# Patient Record
Sex: MALE | Race: WHITE | NOT HISPANIC OR LATINO | URBAN - METROPOLITAN AREA
[De-identification: names, ages, dates, MRNs, and addresses within clinical notes are randomized per-mention and may not be internally consistent; named-entity substitution may affect disease eponyms.]

---

## 2021-03-27 ENCOUNTER — IMMUNIZATION (OUTPATIENT)
Dept: PRIMARY CARE CLINIC | Facility: CLINIC | Age: 41
End: 2021-03-27

## 2021-03-27 DIAGNOSIS — Z23 NEED FOR VACCINATION: Primary | ICD-10-CM

## 2021-03-27 PROCEDURE — 91301 PR SARS-COV-2 COVID-19 VACCINE, NO PRSV, 100MCG/0.5ML, IM: ICD-10-PCS | Mod: S$GLB,,, | Performed by: INTERNAL MEDICINE

## 2021-03-27 PROCEDURE — 0011A PR IMMUNIZ ADMIN, SARS-COV-2 COVID-19 VACC, 100MCG/0.5ML, 1ST DOSE: CPT | Mod: CV19,S$GLB,, | Performed by: INTERNAL MEDICINE

## 2021-03-27 PROCEDURE — 0011A PR IMMUNIZ ADMIN, SARS-COV-2 COVID-19 VACC, 100MCG/0.5ML, 1ST DOSE: ICD-10-PCS | Mod: CV19,S$GLB,, | Performed by: INTERNAL MEDICINE

## 2021-03-27 PROCEDURE — 91301 PR SARS-COV-2 COVID-19 VACCINE, NO PRSV, 100MCG/0.5ML, IM: CPT | Mod: S$GLB,,, | Performed by: INTERNAL MEDICINE

## 2021-03-27 RX ADMIN — Medication 0.5 ML: at 11:03

## 2023-01-10 ENCOUNTER — HOSPITAL ENCOUNTER (OUTPATIENT)
Dept: RADIOLOGY | Facility: HOSPITAL | Age: 43
Discharge: HOME | End: 2023-01-10
Attending: FAMILY MEDICINE
Payer: MEDICAID

## 2023-01-10 ENCOUNTER — OFFICE VISIT (OUTPATIENT)
Dept: FAMILY MEDICINE | Facility: CLINIC | Age: 43
End: 2023-01-10
Payer: MEDICAID

## 2023-01-10 VITALS
WEIGHT: 315 LBS | HEART RATE: 75 BPM | DIASTOLIC BLOOD PRESSURE: 94 MMHG | BODY MASS INDEX: 40.89 KG/M2 | TEMPERATURE: 97 F | SYSTOLIC BLOOD PRESSURE: 150 MMHG | OXYGEN SATURATION: 99 %

## 2023-01-10 DIAGNOSIS — F43.0 ACUTE STRESS REACTION: ICD-10-CM

## 2023-01-10 DIAGNOSIS — R07.89 OTHER CHEST PAIN: ICD-10-CM

## 2023-01-10 DIAGNOSIS — R06.09 DOE (DYSPNEA ON EXERTION): ICD-10-CM

## 2023-01-10 DIAGNOSIS — G47.30 SLEEP APNEA, UNSPECIFIED TYPE: ICD-10-CM

## 2023-01-10 DIAGNOSIS — R53.83 FATIGUE, UNSPECIFIED TYPE: ICD-10-CM

## 2023-01-10 DIAGNOSIS — I10 PRIMARY HYPERTENSION: Primary | ICD-10-CM

## 2023-01-10 PROCEDURE — 71046 X-RAY EXAM CHEST 2 VIEWS: CPT | Mod: 26,,, | Performed by: RADIOLOGY

## 2023-01-10 PROCEDURE — 3080F PR MOST RECENT DIASTOLIC BLOOD PRESSURE >= 90 MM HG: ICD-10-PCS | Mod: CPTII,,, | Performed by: FAMILY MEDICINE

## 2023-01-10 PROCEDURE — 1159F PR MEDICATION LIST DOCUMENTED IN MEDICAL RECORD: ICD-10-PCS | Mod: CPTII,,, | Performed by: FAMILY MEDICINE

## 2023-01-10 PROCEDURE — 1160F PR REVIEW ALL MEDS BY PRESCRIBER/CLIN PHARMACIST DOCUMENTED: ICD-10-PCS | Mod: CPTII,,, | Performed by: FAMILY MEDICINE

## 2023-01-10 PROCEDURE — 93010 ELECTROCARDIOGRAM REPORT: CPT | Mod: S$PBB,,, | Performed by: INTERNAL MEDICINE

## 2023-01-10 PROCEDURE — 3077F PR MOST RECENT SYSTOLIC BLOOD PRESSURE >= 140 MM HG: ICD-10-PCS | Mod: CPTII,,, | Performed by: FAMILY MEDICINE

## 2023-01-10 PROCEDURE — 3008F BODY MASS INDEX DOCD: CPT | Mod: CPTII,,, | Performed by: FAMILY MEDICINE

## 2023-01-10 PROCEDURE — 99215 OFFICE O/P EST HI 40 MIN: CPT | Mod: PBBFAC,25,PO | Performed by: FAMILY MEDICINE

## 2023-01-10 PROCEDURE — 4010F ACE/ARB THERAPY RXD/TAKEN: CPT | Mod: CPTII,,, | Performed by: FAMILY MEDICINE

## 2023-01-10 PROCEDURE — 99204 PR OFFICE/OUTPT VISIT, NEW, LEVL IV, 45-59 MIN: ICD-10-PCS | Mod: S$PBB,,, | Performed by: FAMILY MEDICINE

## 2023-01-10 PROCEDURE — 3077F SYST BP >= 140 MM HG: CPT | Mod: CPTII,,, | Performed by: FAMILY MEDICINE

## 2023-01-10 PROCEDURE — 71046 X-RAY EXAM CHEST 2 VIEWS: CPT | Mod: TC,FY,PO

## 2023-01-10 PROCEDURE — 3008F PR BODY MASS INDEX (BMI) DOCUMENTED: ICD-10-PCS | Mod: CPTII,,, | Performed by: FAMILY MEDICINE

## 2023-01-10 PROCEDURE — 1159F MED LIST DOCD IN RCRD: CPT | Mod: CPTII,,, | Performed by: FAMILY MEDICINE

## 2023-01-10 PROCEDURE — 71046 XR CHEST PA AND LATERAL: ICD-10-PCS | Mod: 26,,, | Performed by: RADIOLOGY

## 2023-01-10 PROCEDURE — 3080F DIAST BP >= 90 MM HG: CPT | Mod: CPTII,,, | Performed by: FAMILY MEDICINE

## 2023-01-10 PROCEDURE — 99204 OFFICE O/P NEW MOD 45 MIN: CPT | Mod: S$PBB,,, | Performed by: FAMILY MEDICINE

## 2023-01-10 PROCEDURE — 4010F PR ACE/ARB THEARPY RXD/TAKEN: ICD-10-PCS | Mod: CPTII,,, | Performed by: FAMILY MEDICINE

## 2023-01-10 PROCEDURE — 93005 ELECTROCARDIOGRAM TRACING: CPT | Mod: PBBFAC,PO | Performed by: INTERNAL MEDICINE

## 2023-01-10 PROCEDURE — 1160F RVW MEDS BY RX/DR IN RCRD: CPT | Mod: CPTII,,, | Performed by: FAMILY MEDICINE

## 2023-01-10 PROCEDURE — 93010 EKG 12-LEAD: ICD-10-PCS | Mod: S$PBB,,, | Performed by: INTERNAL MEDICINE

## 2023-01-10 PROCEDURE — 99999 PR PBB SHADOW E&M-EST. PATIENT-LVL V: CPT | Mod: PBBFAC,,, | Performed by: FAMILY MEDICINE

## 2023-01-10 PROCEDURE — 99999 PR PBB SHADOW E&M-EST. PATIENT-LVL V: ICD-10-PCS | Mod: PBBFAC,,, | Performed by: FAMILY MEDICINE

## 2023-01-10 RX ORDER — CITALOPRAM 20 MG/1
20 TABLET, FILM COATED ORAL DAILY
Qty: 30 TABLET | Refills: 11 | Status: SHIPPED | OUTPATIENT
Start: 2023-01-10 | End: 2024-01-18

## 2023-01-10 RX ORDER — IBUPROFEN 800 MG/1
800 TABLET ORAL EVERY 6 HOURS PRN
COMMUNITY
Start: 2022-10-02 | End: 2023-05-15

## 2023-01-10 RX ORDER — VALSARTAN AND HYDROCHLOROTHIAZIDE 160; 12.5 MG/1; MG/1
1 TABLET, FILM COATED ORAL DAILY
Qty: 90 TABLET | Refills: 3 | Status: SHIPPED | OUTPATIENT
Start: 2023-01-10 | End: 2023-03-09

## 2023-01-10 NOTE — PROGRESS NOTES
Chief Complaint:    Chief Complaint   Patient presents with    Hypertension       History of Present Illness:    Patient is here after long time,   He says he has been to the ER with extremely high blood pressure episode systolic up in the 220s, this was accompanied by chest pain.    Patient says over the last 6 months he has been short of breath with slight exertion and he gets chest pain.  He has been extremely tired fatigued no energy and can fall asleep easily.    Denies any orthopnea  He denies any weight gain   Occasional alcohol use and does weight but is giving that up also soon.    He was given lisinopril at home but he is not brought any home readings.  He has some tingling numbness of his fingers also.      He is under a lot of stress for about a month or 2 going through some legal battles          ROS:  Review of Systems   Constitutional:  Negative for appetite change, chills and fever.   HENT:  Negative for congestion, ear pain, postnasal drip, rhinorrhea, sinus pressure and sinus pain.    Eyes:  Negative for pain.   Respiratory:  Positive for shortness of breath. Negative for chest tightness.    Cardiovascular:  Positive for chest pain and palpitations.   Gastrointestinal:  Negative for abdominal pain, blood in stool, constipation, diarrhea and nausea.   Genitourinary:  Negative for difficulty urinating, dysuria, flank pain and hematuria.   Musculoskeletal:  Negative for arthralgias and myalgias.   Skin:  Negative for pallor and wound.   Neurological:  Negative for dizziness, tremors, speech difficulty, light-headedness and headaches.   Psychiatric/Behavioral:  Negative for behavioral problems, dysphoric mood and sleep disturbance. The patient is nervous/anxious.    All other systems reviewed and are negative.    History reviewed. No pertinent past medical history.    Social History:  Social History     Socioeconomic History    Marital status:    Tobacco Use    Smoking status: Light Smoker      Packs/day: 0.25     Years: 1.00     Pack years: 0.25     Types: Cigars, Cigarettes    Smokeless tobacco: Never    Tobacco comments:     cigars   Substance and Sexual Activity    Alcohol use: Yes    Drug use: No    Sexual activity: Yes     Partners: Female     Birth control/protection: None       Family History:   family history is not on file.    Health Maintenance   Topic Date Due    Hepatitis C Screening  Never done    Lipid Panel  Never done    TETANUS VACCINE  Never done       Physical Exam:    Vital Signs  Temp: 97.4 °F (36.3 °C)  Temp src: Tympanic  Pulse: 75  SpO2: 99 %  BP: (!) 150/94  BP Location: Right arm  Patient Position: Sitting  Pain Score: 0-No pain  Height and Weight  Weight: (!) 144.4 kg (318 lb 7.3 oz)]    Body mass index is 40.89 kg/m².    Physical Exam  Vitals and nursing note reviewed.   Constitutional:       Appearance: Normal appearance.   HENT:      Head: Normocephalic and atraumatic.      Right Ear: Tympanic membrane and ear canal normal.      Left Ear: Tympanic membrane and ear canal normal.   Eyes:      Extraocular Movements: Extraocular movements intact.      Pupils: Pupils are equal, round, and reactive to light.   Cardiovascular:      Rate and Rhythm: Normal rate and regular rhythm.      Pulses: Normal pulses.      Heart sounds: Normal heart sounds. No murmur heard.    No gallop.   Pulmonary:      Effort: Pulmonary effort is normal. No respiratory distress.      Breath sounds: Normal breath sounds. No wheezing, rhonchi or rales.   Abdominal:      General: There is no distension.      Palpations: Abdomen is soft.      Tenderness: There is no abdominal tenderness.   Musculoskeletal:         General: No swelling, deformity or signs of injury. Normal range of motion.      Cervical back: Normal range of motion.   Skin:     General: Skin is warm and dry.      Capillary Refill: Capillary refill takes less than 2 seconds.      Coloration: Skin is not jaundiced or pale.   Neurological:       General: No focal deficit present.      Mental Status: He is alert and oriented to person, place, and time.   Psychiatric:         Mood and Affect: Mood normal.         Behavior: Behavior normal.         Assessment:      ICD-10-CM ICD-9-CM   1. Primary hypertension  I10 401.9   2. Acute stress reaction  F43.0 308.9   3. BLAS (dyspnea on exertion)  R06.09 786.09   4. Other chest pain  R07.89 786.59   5. Sleep apnea, unspecified type  G47.30 780.57   6. Fatigue, unspecified type  R53.83 780.79         Plan:       He will need a full workup, differential include coronary ischemia/underlying heart failure.  EKG chest x-ray and labs as below   Patient's [place] score is abnormal he will need a sleep study to rule out sleep apnea   Underlying fatigue could be multifactorial possibly related to sleep apnea but other causes need to be ruled out  Uncontrolled blood pressure will need to be treated start on Diovan hydrochlorothiazide start monitoring blood pressure twice a day and bring the numbers back     Patient is scheduled for a trial in the next couple of weeks however his medical conditions warrant the full evaluation before he can be released for a few weeks to undergo a legal trial.    He will follow-up in 2 weeks        Orders Placed This Encounter   Procedures    X-Ray Chest PA And Lateral    CBC Auto Differential    Comprehensive Metabolic Panel    Hemoglobin A1C    Lipid Panel    TSH    Vitamin B12    Testosterone    B-TYPE NATRIURETIC PEPTIDE    Nuclear Stress - Cardiology Interpreted    EKG 12-lead    Home Sleep Study       Current Outpatient Medications   Medication Sig Dispense Refill    ibuprofen (ADVIL,MOTRIN) 800 MG tablet Take 800 mg by mouth every 6 (six) hours as needed.      citalopram (CELEXA) 20 MG tablet Take 1 tablet (20 mg total) by mouth once daily. 30 tablet 11    valsartan-hydrochlorothiazide (DIOVAN-HCT) 160-12.5 mg per tablet Take 1 tablet by mouth once daily. 90 tablet 3     No current  facility-administered medications for this visit.       There are no discontinued medications.    Follow up in about 2 weeks (around 1/24/2023).      Teri Rich MD      Scribe Attestation:   I, Ken Peter, am scribing for, and in the presence of, Dr.Arif Rich I performed the above scribed service and the documentation accurately describes the services I performed. I attest to the accuracy of the note.    I, Dr. Teri Rich, reviewed documentation as scribed above. I performed the services described in this documentation.  I agree that the record reflects my personal performance and is accurate and complete. Teri Rich MD.  01/06/2023

## 2023-01-11 ENCOUNTER — LAB VISIT (OUTPATIENT)
Dept: LAB | Facility: HOSPITAL | Age: 43
End: 2023-01-11
Attending: FAMILY MEDICINE
Payer: MEDICAID

## 2023-01-11 ENCOUNTER — TELEPHONE (OUTPATIENT)
Dept: CARDIOLOGY | Facility: HOSPITAL | Age: 43
End: 2023-01-11
Payer: MEDICAID

## 2023-01-11 ENCOUNTER — TELEPHONE (OUTPATIENT)
Dept: SLEEP MEDICINE | Facility: CLINIC | Age: 43
End: 2023-01-11
Payer: MEDICAID

## 2023-01-11 DIAGNOSIS — R07.89 OTHER CHEST PAIN: ICD-10-CM

## 2023-01-11 DIAGNOSIS — R53.83 FATIGUE, UNSPECIFIED TYPE: ICD-10-CM

## 2023-01-11 DIAGNOSIS — I10 PRIMARY HYPERTENSION: ICD-10-CM

## 2023-01-11 LAB
ALBUMIN SERPL BCP-MCNC: 4.5 G/DL (ref 3.5–5.2)
ALP SERPL-CCNC: 75 U/L (ref 55–135)
ALT SERPL W/O P-5'-P-CCNC: 27 U/L (ref 10–44)
ANION GAP SERPL CALC-SCNC: 15 MMOL/L (ref 8–16)
AST SERPL-CCNC: 25 U/L (ref 10–40)
BASOPHILS # BLD AUTO: 0.07 K/UL (ref 0–0.2)
BASOPHILS NFR BLD: 0.6 % (ref 0–1.9)
BILIRUB SERPL-MCNC: 1 MG/DL (ref 0.1–1)
BNP SERPL-MCNC: <10 PG/ML (ref 0–99)
BUN SERPL-MCNC: 12 MG/DL (ref 6–20)
CALCIUM SERPL-MCNC: 10.3 MG/DL (ref 8.7–10.5)
CHLORIDE SERPL-SCNC: 103 MMOL/L (ref 95–110)
CHOLEST SERPL-MCNC: 206 MG/DL (ref 120–199)
CHOLEST/HDLC SERPL: 7.1 {RATIO} (ref 2–5)
CO2 SERPL-SCNC: 19 MMOL/L (ref 23–29)
CREAT SERPL-MCNC: 0.8 MG/DL (ref 0.5–1.4)
DIFFERENTIAL METHOD: ABNORMAL
EOSINOPHIL # BLD AUTO: 0.3 K/UL (ref 0–0.5)
EOSINOPHIL NFR BLD: 2.4 % (ref 0–8)
ERYTHROCYTE [DISTWIDTH] IN BLOOD BY AUTOMATED COUNT: 12.6 % (ref 11.5–14.5)
EST. GFR  (NO RACE VARIABLE): >60 ML/MIN/1.73 M^2
ESTIMATED AVG GLUCOSE: 88 MG/DL (ref 68–131)
GLUCOSE SERPL-MCNC: 82 MG/DL (ref 70–110)
HBA1C MFR BLD: 4.7 % (ref 4–5.6)
HCT VFR BLD AUTO: 50.5 % (ref 40–54)
HDLC SERPL-MCNC: 29 MG/DL (ref 40–75)
HDLC SERPL: 14.1 % (ref 20–50)
HGB BLD-MCNC: 17 G/DL (ref 14–18)
IMM GRANULOCYTES # BLD AUTO: 0.07 K/UL (ref 0–0.04)
IMM GRANULOCYTES NFR BLD AUTO: 0.6 % (ref 0–0.5)
LDLC SERPL CALC-MCNC: 146.4 MG/DL (ref 63–159)
LYMPHOCYTES # BLD AUTO: 2.4 K/UL (ref 1–4.8)
LYMPHOCYTES NFR BLD: 22 % (ref 18–48)
MCH RBC QN AUTO: 31.7 PG (ref 27–31)
MCHC RBC AUTO-ENTMCNC: 33.7 G/DL (ref 32–36)
MCV RBC AUTO: 94 FL (ref 82–98)
MONOCYTES # BLD AUTO: 0.7 K/UL (ref 0.3–1)
MONOCYTES NFR BLD: 6.3 % (ref 4–15)
NEUTROPHILS # BLD AUTO: 7.3 K/UL (ref 1.8–7.7)
NEUTROPHILS NFR BLD: 68.1 % (ref 38–73)
NONHDLC SERPL-MCNC: 177 MG/DL
NRBC BLD-RTO: 0 /100 WBC
PLATELET # BLD AUTO: 183 K/UL (ref 150–450)
PMV BLD AUTO: 12.1 FL (ref 9.2–12.9)
POTASSIUM SERPL-SCNC: 4.3 MMOL/L (ref 3.5–5.1)
PROT SERPL-MCNC: 7.7 G/DL (ref 6–8.4)
RBC # BLD AUTO: 5.36 M/UL (ref 4.6–6.2)
SODIUM SERPL-SCNC: 137 MMOL/L (ref 136–145)
TESTOST SERPL-MCNC: 336 NG/DL (ref 304–1227)
TRIGL SERPL-MCNC: 153 MG/DL (ref 30–150)
TSH SERPL DL<=0.005 MIU/L-ACNC: 1.18 UIU/ML (ref 0.4–4)
VIT B12 SERPL-MCNC: 1160 PG/ML (ref 210–950)
WBC # BLD AUTO: 10.78 K/UL (ref 3.9–12.7)

## 2023-01-11 PROCEDURE — 80061 LIPID PANEL: CPT | Performed by: FAMILY MEDICINE

## 2023-01-11 PROCEDURE — 82607 VITAMIN B-12: CPT | Performed by: FAMILY MEDICINE

## 2023-01-11 PROCEDURE — 83880 ASSAY OF NATRIURETIC PEPTIDE: CPT | Performed by: FAMILY MEDICINE

## 2023-01-11 PROCEDURE — 84443 ASSAY THYROID STIM HORMONE: CPT | Performed by: FAMILY MEDICINE

## 2023-01-11 PROCEDURE — 36415 COLL VENOUS BLD VENIPUNCTURE: CPT | Mod: PO | Performed by: FAMILY MEDICINE

## 2023-01-11 PROCEDURE — 80053 COMPREHEN METABOLIC PANEL: CPT | Performed by: FAMILY MEDICINE

## 2023-01-11 PROCEDURE — 83036 HEMOGLOBIN GLYCOSYLATED A1C: CPT | Performed by: FAMILY MEDICINE

## 2023-01-11 PROCEDURE — 84403 ASSAY OF TOTAL TESTOSTERONE: CPT | Performed by: FAMILY MEDICINE

## 2023-01-11 PROCEDURE — 85025 COMPLETE CBC W/AUTO DIFF WBC: CPT | Performed by: FAMILY MEDICINE

## 2023-01-12 ENCOUNTER — PATIENT MESSAGE (OUTPATIENT)
Dept: FAMILY MEDICINE | Facility: CLINIC | Age: 43
End: 2023-01-12
Payer: MEDICAID

## 2023-01-24 ENCOUNTER — HOSPITAL ENCOUNTER (OUTPATIENT)
Dept: CARDIOLOGY | Facility: HOSPITAL | Age: 43
Discharge: HOME | End: 2023-01-24
Attending: FAMILY MEDICINE
Payer: MEDICAID

## 2023-01-24 ENCOUNTER — HOSPITAL ENCOUNTER (OUTPATIENT)
Dept: RADIOLOGY | Facility: HOSPITAL | Age: 43
Discharge: HOME | End: 2023-01-24
Attending: FAMILY MEDICINE
Payer: MEDICAID

## 2023-01-24 DIAGNOSIS — R07.89 OTHER CHEST PAIN: ICD-10-CM

## 2023-01-24 PROCEDURE — 93016 NUCLEAR STRESS - CARDIOLOGY INTERPRETED (CUPID ONLY): ICD-10-PCS | Mod: ,,, | Performed by: INTERNAL MEDICINE

## 2023-01-24 PROCEDURE — 93018 NUCLEAR STRESS - CARDIOLOGY INTERPRETED (CUPID ONLY): ICD-10-PCS | Mod: ,,, | Performed by: INTERNAL MEDICINE

## 2023-01-24 PROCEDURE — A9502 TC99M TETROFOSMIN: HCPCS

## 2023-01-24 PROCEDURE — 78452 HT MUSCLE IMAGE SPECT MULT: CPT

## 2023-01-24 PROCEDURE — 93017 CV STRESS TEST TRACING ONLY: CPT

## 2023-01-24 PROCEDURE — 93018 CV STRESS TEST I&R ONLY: CPT | Mod: ,,, | Performed by: INTERNAL MEDICINE

## 2023-01-24 PROCEDURE — 78452 HT MUSCLE IMAGE SPECT MULT: CPT | Mod: 26,,, | Performed by: INTERNAL MEDICINE

## 2023-01-24 PROCEDURE — 78452 NUCLEAR STRESS - CARDIOLOGY INTERPRETED (CUPID ONLY): ICD-10-PCS | Mod: 26,,, | Performed by: INTERNAL MEDICINE

## 2023-01-24 PROCEDURE — 63600175 PHARM REV CODE 636 W HCPCS: Performed by: FAMILY MEDICINE

## 2023-01-24 PROCEDURE — 93016 CV STRESS TEST SUPVJ ONLY: CPT | Mod: ,,, | Performed by: INTERNAL MEDICINE

## 2023-01-24 RX ORDER — REGADENOSON 0.08 MG/ML
0.4 INJECTION, SOLUTION INTRAVENOUS ONCE
Status: COMPLETED | OUTPATIENT
Start: 2023-01-24 | End: 2023-01-24

## 2023-01-24 RX ADMIN — REGADENOSON 0.4 MG: 0.08 INJECTION, SOLUTION INTRAVENOUS at 12:01

## 2023-01-25 ENCOUNTER — TELEPHONE (OUTPATIENT)
Dept: INTERNAL MEDICINE | Facility: CLINIC | Age: 43
End: 2023-01-25
Payer: MEDICAID

## 2023-01-25 LAB
CV STRESS BASE HR: 82 BPM
DIASTOLIC BLOOD PRESSURE: 86 MMHG
NUC REST EJECTION FRACTION: 68
NUC STRESS EJECTION FRACTION: 68 %
OHS CV CPX 85 PERCENT MAX PREDICTED HEART RATE MALE: 153
OHS CV CPX MAX PREDICTED HEART RATE: 180
OHS CV CPX PATIENT IS FEMALE: 0
OHS CV CPX PATIENT IS MALE: 1
OHS CV CPX PEAK DIASTOLIC BLOOD PRESSURE: 71 MMHG
OHS CV CPX PEAK HEAR RATE: 115 BPM
OHS CV CPX PEAK RATE PRESSURE PRODUCT: NORMAL
OHS CV CPX PEAK SYSTOLIC BLOOD PRESSURE: 136 MMHG
OHS CV CPX PERCENT MAX PREDICTED HEART RATE ACHIEVED: 64
OHS CV CPX RATE PRESSURE PRODUCT PRESENTING: NORMAL
STRESS ECHO POST EXERCISE DUR SEC: 57 SECONDS
SYSTOLIC BLOOD PRESSURE: 124 MMHG

## 2023-01-25 NOTE — TELEPHONE ENCOUNTER
Spoke with pt verbalized understanding that stress test  results were normal upon review by Dr. Rich

## 2023-01-25 NOTE — TELEPHONE ENCOUNTER
----- Message from Teri Rich MD sent at 1/25/2023  1:33 PM CST -----  Stress test is essentially normal.

## 2023-02-06 ENCOUNTER — HOSPITAL ENCOUNTER (OUTPATIENT)
Dept: SLEEP MEDICINE | Facility: HOSPITAL | Age: 43
Discharge: HOME | End: 2023-02-06
Attending: FAMILY MEDICINE
Payer: MEDICAID

## 2023-02-06 DIAGNOSIS — G47.30 SLEEP APNEA, UNSPECIFIED TYPE: ICD-10-CM

## 2023-02-06 DIAGNOSIS — G47.33 OSA (OBSTRUCTIVE SLEEP APNEA): Primary | ICD-10-CM

## 2023-02-06 PROCEDURE — 95800 SLP STDY UNATTENDED: CPT | Mod: 26,,, | Performed by: INTERNAL MEDICINE

## 2023-02-06 PROCEDURE — 95800 PR SLEEP STUDY, UNATTENDED, RECORD HEART RATE/O2 SAT/RESP ANAL/SLEEP TIME: ICD-10-PCS | Mod: 26,,, | Performed by: INTERNAL MEDICINE

## 2023-02-06 NOTE — Clinical Note
PHYSICIAN INTERPRETATION AND COMMENTS: Findings are consistent with moderate, positional obstructive sleep apnea(ENRIQUE). Therapy with CPAP indicated. CLINICAL HISTORY: 42 year old male presented with: 17 inch neck, BMI of 38.5, an [place] sleepiness score of 14, history of hypertension and symptoms of nocturnal snoring, waking up choking and witnessed apneas. Based on the clinical history, the patient has a high pre-test probability of having Severe ENRIQUE.

## 2023-02-06 NOTE — PROCEDURES
PHYSICIAN INTERPRETATION AND COMMENTS: Findings are consistent with moderate, positional obstructive sleep  apnea(ENRIQUE). Therapy with CPAP indicated.  CLINICAL HISTORY: 42 year old male presented with: 17 inch neck, BMI of 38.5, an [place] sleepiness score of 14, history of  hypertension and symptoms of nocturnal snoring, waking up choking and witnessed apneas. Based on the clinical history,  the patient has a high pre-test probability of having Severe ENRIQUE.  SLEEP STUDY FINDINGS: Patient underwent a 1 night Home Sleep Test and by behavioral criteria, slept for approximately  6.49 hours, with a sleep latency of 12 minutes and a sleep efficiency of 96%. Mild sleep disordered breathing (AHI=8) is  noted based on a 4% hypopnea desaturation criteria, predominantly in the supine position (13 events/hour). The patient  slept supine 55.8% of the night based on valid recording time of 6.54 hours and is 6.5 times as likely to have  apneas/hypopneas when supine. When considering more subtle measures of sleep disordered breathing, the overall  respiratory disturbance index is moderate(RDI=21) based on a 1% hypopnea desaturation criteria with confirmation by  surrogate arousal indicators. The apneas/hypopneas are accompanied by minimal oxygen desaturation (percent time  below 90% SpO2: 0%, Min SpO2: 85.6%). The average desaturation across all sleep disordered breathing events is 2.5%.  Snoring occurs for 0.7% (30 dB) of the study. The mean pulse rate is 61.6 BPM, with very frequent pulse rate variability (62  events with >= 6 BPM increase/decrease per hour).  TREATMENT CONSIDERATIONS: Consider nasal continuous positive airway pressure (CPAP/AutoPAP) as the initial  treatment choice based on the RDI severity, daytime somnolence and co-morbidities. A mandibular advancement splint  (MAS) or referral to an ENT surgeon for modification to the airway should be considered to reduce daytime somnolence  and the potential contribution of  "ENRIQUE on existing diseases if the patient prefers an alternative therapy or the CPAP trial is  unsuccessful. Based on the ENRIQUE Supine data in the study, a Mandibular Advancement Splint (MAS) will likely provide  treatment benefit independent of ENRIQUE severity. The patient should avoid sleeping supine given non-supine AHI is in the  normal range.  DISEASE MANAGEMENT CONSIDERATIONS: None.      Dear Teri Rich MD  [id number] [address] [id number]/Teri Rich MD         The sleep study that you ordered is complete.  You have ordered sleep LAB services to perform the sleep study for Kendrick Moreno .      Please find Sleep Study result in  the "Media tab" of Chart Review menu.        You can look  for the report in the  Media by the document type "Sleep Study Documents". Alphabetizing  "Document type" column helps to find the SLEEP STUDY report  Faster.       As the ordering provider, you are responsible for reviewing the results and implementing a treatment plan with your patient.    If you need a Sleep Medicine provider to explain the sleep study findings and arrange treatment for the patient, please refer patient for consultation to our Sleep Clinic via [facility] with Ambulatory Consult Sleep.     To do that please place an order for an  "Ambulatory Consult Sleep" -  order , it will go to our clinic work queue for our staff  to contact the patient for an appointment.      For any questions, please contact our sleep lab  staff at [phone] to talk to clinical staff          Savage Crowe MD   "

## 2023-02-07 ENCOUNTER — TELEPHONE (OUTPATIENT)
Dept: INTERNAL MEDICINE | Facility: CLINIC | Age: 43
End: 2023-02-07
Payer: MEDICAID

## 2023-02-07 NOTE — TELEPHONE ENCOUNTER
----- Message from Teri Rich MD sent at 2/7/2023  1:14 PM CST -----  Patient has moderate sleep apnea which is positional, recommend CPAP.    Also recommend using a mandibular advancement device this can be bought without a prescription    Please refer to pulmonology

## 2023-02-07 NOTE — TELEPHONE ENCOUNTER
Spoke with pt verbalized understanding of sleep study results and recommendation ( CPAP ) of Dr. Rich upon review

## 2023-02-09 ENCOUNTER — PATIENT MESSAGE (OUTPATIENT)
Dept: FAMILY MEDICINE | Facility: CLINIC | Age: 43
End: 2023-02-09
Payer: MEDICAID

## 2023-02-09 ENCOUNTER — TELEPHONE (OUTPATIENT)
Dept: FAMILY MEDICINE | Facility: CLINIC | Age: 43
End: 2023-02-09
Payer: MEDICAID

## 2023-02-09 DIAGNOSIS — G47.30 SLEEP APNEA, UNSPECIFIED TYPE: ICD-10-CM

## 2023-02-09 DIAGNOSIS — R06.09 DOE (DYSPNEA ON EXERTION): Primary | ICD-10-CM

## 2023-02-23 ENCOUNTER — OFFICE VISIT (OUTPATIENT)
Dept: PULMONOLOGY | Facility: CLINIC | Age: 43
End: 2023-02-23
Payer: MEDICAID

## 2023-02-23 VITALS
RESPIRATION RATE: 19 BRPM | DIASTOLIC BLOOD PRESSURE: 84 MMHG | HEIGHT: 74 IN | WEIGHT: 315 LBS | SYSTOLIC BLOOD PRESSURE: 120 MMHG | OXYGEN SATURATION: 98 % | HEART RATE: 76 BPM | BODY MASS INDEX: 40.43 KG/M2

## 2023-02-23 DIAGNOSIS — I10 ESSENTIAL HYPERTENSION: ICD-10-CM

## 2023-02-23 DIAGNOSIS — G47.33 OBSTRUCTIVE SLEEP APNEA: Primary | ICD-10-CM

## 2023-02-23 DIAGNOSIS — E66.01 CLASS 3 SEVERE OBESITY WITH SERIOUS COMORBIDITY AND BODY MASS INDEX (BMI) OF 40.0 TO 44.9 IN ADULT, UNSPECIFIED OBESITY TYPE: ICD-10-CM

## 2023-02-23 PROBLEM — E66.813 CLASS 3 SEVERE OBESITY WITH SERIOUS COMORBIDITY AND BODY MASS INDEX (BMI) OF 40.0 TO 44.9 IN ADULT: Status: ACTIVE | Noted: 2023-02-23

## 2023-02-23 PROCEDURE — 4010F PR ACE/ARB THEARPY RXD/TAKEN: ICD-10-PCS | Mod: CPTII,,, | Performed by: NURSE PRACTITIONER

## 2023-02-23 PROCEDURE — 3008F PR BODY MASS INDEX (BMI) DOCUMENTED: ICD-10-PCS | Mod: CPTII,,, | Performed by: NURSE PRACTITIONER

## 2023-02-23 PROCEDURE — 99999 PR PBB SHADOW E&M-EST. PATIENT-LVL IV: CPT | Mod: PBBFAC,,, | Performed by: NURSE PRACTITIONER

## 2023-02-23 PROCEDURE — 3044F HG A1C LEVEL LT 7.0%: CPT | Mod: CPTII,,, | Performed by: NURSE PRACTITIONER

## 2023-02-23 PROCEDURE — 3008F BODY MASS INDEX DOCD: CPT | Mod: CPTII,,, | Performed by: NURSE PRACTITIONER

## 2023-02-23 PROCEDURE — 3074F SYST BP LT 130 MM HG: CPT | Mod: CPTII,,, | Performed by: NURSE PRACTITIONER

## 2023-02-23 PROCEDURE — 4010F ACE/ARB THERAPY RXD/TAKEN: CPT | Mod: CPTII,,, | Performed by: NURSE PRACTITIONER

## 2023-02-23 PROCEDURE — 1159F PR MEDICATION LIST DOCUMENTED IN MEDICAL RECORD: ICD-10-PCS | Mod: CPTII,,, | Performed by: NURSE PRACTITIONER

## 2023-02-23 PROCEDURE — 3044F PR MOST RECENT HEMOGLOBIN A1C LEVEL <7.0%: ICD-10-PCS | Mod: CPTII,,, | Performed by: NURSE PRACTITIONER

## 2023-02-23 PROCEDURE — 1159F MED LIST DOCD IN RCRD: CPT | Mod: CPTII,,, | Performed by: NURSE PRACTITIONER

## 2023-02-23 PROCEDURE — 99999 PR PBB SHADOW E&M-EST. PATIENT-LVL IV: ICD-10-PCS | Mod: PBBFAC,,, | Performed by: NURSE PRACTITIONER

## 2023-02-23 PROCEDURE — 99203 OFFICE O/P NEW LOW 30 MIN: CPT | Mod: S$PBB,,, | Performed by: NURSE PRACTITIONER

## 2023-02-23 PROCEDURE — 3079F PR MOST RECENT DIASTOLIC BLOOD PRESSURE 80-89 MM HG: ICD-10-PCS | Mod: CPTII,,, | Performed by: NURSE PRACTITIONER

## 2023-02-23 PROCEDURE — 1160F RVW MEDS BY RX/DR IN RCRD: CPT | Mod: CPTII,,, | Performed by: NURSE PRACTITIONER

## 2023-02-23 PROCEDURE — 3074F PR MOST RECENT SYSTOLIC BLOOD PRESSURE < 130 MM HG: ICD-10-PCS | Mod: CPTII,,, | Performed by: NURSE PRACTITIONER

## 2023-02-23 PROCEDURE — 99203 PR OFFICE/OUTPT VISIT, NEW, LEVL III, 30-44 MIN: ICD-10-PCS | Mod: S$PBB,,, | Performed by: NURSE PRACTITIONER

## 2023-02-23 PROCEDURE — 1160F PR REVIEW ALL MEDS BY PRESCRIBER/CLIN PHARMACIST DOCUMENTED: ICD-10-PCS | Mod: CPTII,,, | Performed by: NURSE PRACTITIONER

## 2023-02-23 PROCEDURE — 99214 OFFICE O/P EST MOD 30 MIN: CPT | Mod: PBBFAC | Performed by: NURSE PRACTITIONER

## 2023-02-23 PROCEDURE — 3079F DIAST BP 80-89 MM HG: CPT | Mod: CPTII,,, | Performed by: NURSE PRACTITIONER

## 2023-02-23 RX ORDER — LISINOPRIL 10 MG/1
10 TABLET ORAL
COMMUNITY
Start: 2023-01-06 | End: 2023-02-23

## 2023-02-23 RX ORDER — TRIAZOLAM 0.25 MG/1
0.25 TABLET ORAL
COMMUNITY
Start: 2022-09-09 | End: 2023-05-15

## 2023-02-23 RX ORDER — LISINOPRIL 10 MG/1
TABLET ORAL
COMMUNITY
Start: 2023-01-06 | End: 2023-02-23

## 2023-02-23 NOTE — ASSESSMENT & PLAN NOTE
02/02/2023 Home Sleep Study mild to moderate obstructive sleep apnea AHI 8.0. RDI 21.  02/23/2023 order Auto CPAP 5-20 cm   Nasal mask   HME: [facility]  Follow up 31-90 days from obtaining PAP therapy for IDL.

## 2023-02-23 NOTE — ASSESSMENT & PLAN NOTE
Encouraged calorie reduction and 30 minutes of exercise daily. Discussed impact of obesity on general health.  Wt Readings from Last 9 Encounters:   02/23/23 (!) 143.3 kg (315 lb 14.7 oz)   01/10/23 (!) 144.4 kg (318 lb 7.3 oz)   11/16/16 (!) 140.2 kg (309 lb)   11/09/16 133.3 kg (293 lb 14 oz)   12/18/15 131.9 kg (290 lb 12.6 oz)   12/15/15 132.3 kg (291 lb 10.7 oz)   12/29/14 136.1 kg (300 lb)   12/17/14 136.1 kg (300 lb)   11/10/14 (!) 136.5 kg (301 lb)    Body mass index is 40.56 kg/m².

## 2023-02-23 NOTE — PROGRESS NOTES
Subjective:      Patient ID: Kendrick Moreno is a 42 y.o. male.    Patient Active Problem List   Diagnosis    Obstructive sleep apnea    Class 3 severe obesity with serious comorbidity and body mass index (BMI) of 40.0 to 44.9 in adult    Essential hypertension       he has been referred by Reagan Rich MD for evaluation and management for   Chief Complaint   Patient presents with    Sleep Apnea     Review sleep study       Chief Complaint: Sleep Apnea (Review sleep study)      HPI:  He presents for obstructive sleep apnea with review Home Sleep Study.    02/02/2023 Home Sleep Study mild to moderate obstructive sleep apnea AHI 8.0. RDI 21.    Patient has observed snoring, periods of not breathing witnessed by wife, feels sleepy during the day, take naps during the day.  Patient reports non restful sleep.  He reports morning headache.   He reports day time napping; duration 20 Minutes to 2 hours  He denies recent weight gain.  Cardiovascular risk factors: hypertension and obesity  Bed time is 1100 - 1200  Wake time is 0700  Sleep onset is within  15 - 30 Minutes.  Sleep maintenance difficulties related to non-restful sleep  Wake after sleep onset occurs one time a night.  Nocturia occurs none   Sleep aids :  NO  Dry mouth :  NO  Sleep walking:  NO  Sleep talking :  NO  Sleep eating: NO  Vivid Dreams :  NO  Cataplexy :  NO    [place] Sleepiness Scale   EPWORTH SLEEPINESS SCALE 2/22/2023 1/10/2023   Sitting and reading 3 2   Watching TV 3 3   Sitting, inactive in a public place (e.g. a theatre or a meeting) 2 1   As a passenger in a car for an hour without a break 3 2   Lying down to rest in the afternoon when circumstances permit 2 3   Sitting and talking to someone 1 1   Sitting quietly after a lunch without alcohol 2 2   In a car, while stopped for a few minutes in traffic 1 1   Total score 17 15       Neck circumference is 46 cm. (18 inches).  Mallampati score 4    STOP - BANG Questionnaire:   1. Snoring : Do  you snore loudly ?     YES  2. Tired : Do you often feel tired, fatigued, or sleepy during daytime?   YES  3. Observed: Has anyone observed you stop breathing during your sleep?     YES, wife  4. Blood pressure : Do you have or are you being treated for high blood pressure?    YES  5. BMI :BMI more than 35 kg/m2?    YES  6. Age : Age over 50 yr old?   NO  7. Neck circumference: Neck circumference greater than 40 cm?    YES  8. Gender: Gender male?    YES    SCORE: 7    High risk of ENRIQUE: Yes 5 - 8  Intermediate risk of ENRIQUE: Yes 3 - 4  Low risk of ENRIQUE: Yes 0 - 2    Previous Report Reviewed: lab reports and office notes     Past Medical History: The following portions of the patient's history were reviewed and updated as appropriate:   He  has no past surgical history on file.  His family history is not on file.  He  reports that he has been smoking cigars. He has a 0.25 pack-year smoking history. He has never used smokeless tobacco. He reports current alcohol use. He reports that he does not use drugs.  He has a current medication list which includes the following prescription(s): citalopram, valsartan-hydrochlorothiazide, ibuprofen, lisinopril, lisinopril, and triazolam.  He is allergic to keflex [cephalexin]..    Review of Systems   Constitutional:  Negative for fever, chills, weight loss, weight gain, activity change, appetite change, fatigue and night sweats.   HENT:  Negative for postnasal drip, rhinorrhea, sinus pressure, voice change and congestion.    Eyes:  Negative for redness and itching.   Respiratory:  Negative for snoring, cough, sputum production, chest tightness, shortness of breath, wheezing, orthopnea, asthma nighttime symptoms, dyspnea on extertion, use of rescue inhaler and somnolence.    Cardiovascular: Negative.  Negative for chest pain, palpitations and leg swelling.   Genitourinary:  Negative for difficulty urinating and hematuria.   Endocrine:  Negative for cold intolerance and heat  "intolerance.    Musculoskeletal:  Negative for arthralgias, gait problem, joint swelling and myalgias.   Skin: Negative.    Gastrointestinal:  Negative for nausea, vomiting, abdominal pain and acid reflux.   Neurological:  Negative for dizziness, weakness, light-headedness and headaches.   Hematological:  Negative for adenopathy. No excessive bruising.   All other systems reviewed and are negative.   Objective:   /84   Pulse 76   Resp 19   Ht 6' 2" (1.88 m)   Wt (!) 143.3 kg (315 lb 14.7 oz)   SpO2 98%   BMI 40.56 kg/m²   Physical Exam  Vitals and nursing note reviewed.   Constitutional:       General: He is not in acute distress.     Appearance: Normal appearance. He is well-developed. He is not ill-appearing or toxic-appearing.   HENT:      Head: Normocephalic and atraumatic.      Right Ear: External ear normal.      Left Ear: External ear normal.      Nose: Nose normal.      Mouth/Throat:      Mouth: Mucous membranes are moist.      Pharynx: Oropharynx is clear. No oropharyngeal exudate.   Eyes:      Conjunctiva/sclera: Conjunctivae normal.   Cardiovascular:      Rate and Rhythm: Normal rate and regular rhythm.      Heart sounds: Normal heart sounds.   Pulmonary:      Effort: Pulmonary effort is normal.      Breath sounds: Normal breath sounds.   Abdominal:      Palpations: Abdomen is soft.   Musculoskeletal:      Cervical back: Normal range of motion and neck supple.   Skin:     General: Skin is warm and dry.   Neurological:      Mental Status: He is alert and oriented to person, place, and time.   Psychiatric:         Behavior: Behavior normal. Behavior is cooperative.         Thought Content: Thought content normal.         Judgment: Judgment normal.       Personal Diagnostic Review  Review of labs, xray's, cardiology reports.     02/02/2023 Home Sleep Study mild to moderate obstructive sleep apnea AHI 8.0. RDI 21  Assessment:     1. Obstructive sleep apnea    2. Class 3 severe obesity with serious " comorbidity and body mass index (BMI) of 40.0 to 44.9 in adult, unspecified obesity type    3. Essential hypertension      Orders Placed This Encounter   Procedures    CPAP FOR HOME USE     02/02/2023 Home Sleep Study mild to moderate obstructive sleep apnea AHI 8.0. RDI 21     Order Specific Question:   Length of need (1-99 months):     Answer:   99     Order Specific Question:   Type ():     Answer:   Auto CPAP     Order Specific Question:   Auto CPAP pressure setting range (cmH20):     Answer:   5-20     Order Specific Question:   Fulfillment Priority:     Answer:   Level 4:  all others     Order Specific Question:   Humidification ():     Answer:   Heated     Order Specific Question:   Choose ONE mask type and its corresponding cushions and/or pillows:     Answer:    Nasal Mask, 1 per 90 days:  Nasal Cushions, (6 per 90 days):  Nasal Pillows, (6 per 90 days)     Comments:   or mask of choice     Order Specific Question:   Choose EITHER Heated or Non-Heated Tubjing     Answer:    Non-Heated Tubing, 1 per 90 days     Order Specific Question:   Number of Days Needed:     Answer:   99     Order Specific Question:   All other supplies as needed as listed below:     Answer:    Headgear, 1 per 180 days     Order Specific Question:   All other supplies as needed as listed below:     Answer:    Chin Strap, 1 per 180 days     Order Specific Question:   All other supplies as needed as listed below:     Answer:    Disposable Filter, 6 per 90 days     Order Specific Question:   All other supplies as needed as listed below:     Answer:    Non-Disposable Filter, 1 per 180 days     Order Specific Question:   All other supplies as needed as listed below:     Answer:    Humidifier Chamber, 1 per 180 days     Plan:   Discussed diagnosis, its evaluation, treatment and usual course. All questions answered.  Problem List Items Addressed This Visit       Obstructive sleep apnea -  Primary     02/02/2023 Home Sleep Study mild to moderate obstructive sleep apnea AHI 8.0. RDI 21.  02/23/2023 order Auto CPAP 5-20 cm   Nasal mask   HME: [facility]  Follow up 31-90 days from obtaining PAP therapy for IDL.              Relevant Orders    CPAP FOR HOME USE    Essential hypertension     Stable and controlled. Continue current treatment plan as previously prescribed with your PCP.              Class 3 severe obesity with serious comorbidity and body mass index (BMI) of 40.0 to 44.9 in adult     Encouraged calorie reduction and 30 minutes of exercise daily. Discussed impact of obesity on general health.  Wt Readings from Last 9 Encounters:   02/23/23 (!) 143.3 kg (315 lb 14.7 oz)   01/10/23 (!) 144.4 kg (318 lb 7.3 oz)   11/16/16 (!) 140.2 kg (309 lb)   11/09/16 133.3 kg (293 lb 14 oz)   12/18/15 131.9 kg (290 lb 12.6 oz)   12/15/15 132.3 kg (291 lb 10.7 oz)   12/29/14 136.1 kg (300 lb)   12/17/14 136.1 kg (300 lb)   11/10/14 (!) 136.5 kg (301 lb)    Body mass index is 40.56 kg/m².                Follow up in about 10 weeks (around 5/4/2023) for CPAP compliance download after initial set up.    Thank you for the opportunity to participate in the care of this patient.

## 2023-03-07 ENCOUNTER — PATIENT MESSAGE (OUTPATIENT)
Dept: PULMONOLOGY | Facility: CLINIC | Age: 43
End: 2023-03-07
Payer: MEDICAID

## 2023-03-09 ENCOUNTER — TELEPHONE (OUTPATIENT)
Dept: CARDIOLOGY | Facility: CLINIC | Age: 43
End: 2023-03-09
Payer: MEDICAID

## 2023-03-09 ENCOUNTER — OFFICE VISIT (OUTPATIENT)
Dept: CARDIOLOGY | Facility: CLINIC | Age: 43
End: 2023-03-09
Payer: MEDICAID

## 2023-03-09 VITALS
DIASTOLIC BLOOD PRESSURE: 91 MMHG | OXYGEN SATURATION: 95 % | BODY MASS INDEX: 40.03 KG/M2 | WEIGHT: 311.94 LBS | HEIGHT: 74 IN | SYSTOLIC BLOOD PRESSURE: 140 MMHG | HEART RATE: 81 BPM

## 2023-03-09 DIAGNOSIS — R00.2 PALPITATIONS: ICD-10-CM

## 2023-03-09 DIAGNOSIS — R06.09 DOE (DYSPNEA ON EXERTION): ICD-10-CM

## 2023-03-09 DIAGNOSIS — G47.33 OBSTRUCTIVE SLEEP APNEA: ICD-10-CM

## 2023-03-09 DIAGNOSIS — R07.89 OTHER CHEST PAIN: Primary | ICD-10-CM

## 2023-03-09 DIAGNOSIS — R07.89 OTHER CHEST PAIN: ICD-10-CM

## 2023-03-09 DIAGNOSIS — R06.09 DOE (DYSPNEA ON EXERTION): Primary | ICD-10-CM

## 2023-03-09 DIAGNOSIS — I10 ESSENTIAL HYPERTENSION: ICD-10-CM

## 2023-03-09 DIAGNOSIS — E66.01 CLASS 3 SEVERE OBESITY WITH SERIOUS COMORBIDITY AND BODY MASS INDEX (BMI) OF 40.0 TO 44.9 IN ADULT, UNSPECIFIED OBESITY TYPE: ICD-10-CM

## 2023-03-09 PROCEDURE — 4010F PR ACE/ARB THEARPY RXD/TAKEN: ICD-10-PCS | Mod: CPTII,,, | Performed by: INTERNAL MEDICINE

## 2023-03-09 PROCEDURE — 3008F BODY MASS INDEX DOCD: CPT | Mod: CPTII,,, | Performed by: INTERNAL MEDICINE

## 2023-03-09 PROCEDURE — 3008F PR BODY MASS INDEX (BMI) DOCUMENTED: ICD-10-PCS | Mod: CPTII,,, | Performed by: INTERNAL MEDICINE

## 2023-03-09 PROCEDURE — 1159F MED LIST DOCD IN RCRD: CPT | Mod: CPTII,,, | Performed by: INTERNAL MEDICINE

## 2023-03-09 PROCEDURE — 4010F ACE/ARB THERAPY RXD/TAKEN: CPT | Mod: CPTII,,, | Performed by: INTERNAL MEDICINE

## 2023-03-09 PROCEDURE — 3077F SYST BP >= 140 MM HG: CPT | Mod: CPTII,,, | Performed by: INTERNAL MEDICINE

## 2023-03-09 PROCEDURE — 3077F PR MOST RECENT SYSTOLIC BLOOD PRESSURE >= 140 MM HG: ICD-10-PCS | Mod: CPTII,,, | Performed by: INTERNAL MEDICINE

## 2023-03-09 PROCEDURE — 3080F PR MOST RECENT DIASTOLIC BLOOD PRESSURE >= 90 MM HG: ICD-10-PCS | Mod: CPTII,,, | Performed by: INTERNAL MEDICINE

## 2023-03-09 PROCEDURE — 3080F DIAST BP >= 90 MM HG: CPT | Mod: CPTII,,, | Performed by: INTERNAL MEDICINE

## 2023-03-09 PROCEDURE — 99999 PR PBB SHADOW E&M-EST. PATIENT-LVL III: CPT | Mod: PBBFAC,,, | Performed by: INTERNAL MEDICINE

## 2023-03-09 PROCEDURE — 99999 PR PBB SHADOW E&M-EST. PATIENT-LVL III: ICD-10-PCS | Mod: PBBFAC,,, | Performed by: INTERNAL MEDICINE

## 2023-03-09 PROCEDURE — 3044F HG A1C LEVEL LT 7.0%: CPT | Mod: CPTII,,, | Performed by: INTERNAL MEDICINE

## 2023-03-09 PROCEDURE — 99205 OFFICE O/P NEW HI 60 MIN: CPT | Mod: S$PBB,,, | Performed by: INTERNAL MEDICINE

## 2023-03-09 PROCEDURE — 1159F PR MEDICATION LIST DOCUMENTED IN MEDICAL RECORD: ICD-10-PCS | Mod: CPTII,,, | Performed by: INTERNAL MEDICINE

## 2023-03-09 PROCEDURE — 3044F PR MOST RECENT HEMOGLOBIN A1C LEVEL <7.0%: ICD-10-PCS | Mod: CPTII,,, | Performed by: INTERNAL MEDICINE

## 2023-03-09 PROCEDURE — 99213 OFFICE O/P EST LOW 20 MIN: CPT | Mod: PBBFAC | Performed by: INTERNAL MEDICINE

## 2023-03-09 PROCEDURE — 99205 PR OFFICE/OUTPT VISIT, NEW, LEVL V, 60-74 MIN: ICD-10-PCS | Mod: S$PBB,,, | Performed by: INTERNAL MEDICINE

## 2023-03-09 RX ORDER — ASPIRIN 81 MG/1
81 TABLET ORAL DAILY
Qty: 90 TABLET | Refills: 1 | Status: SHIPPED | OUTPATIENT
Start: 2023-03-09 | End: 2023-10-11 | Stop reason: SDUPTHER

## 2023-03-09 RX ORDER — ATORVASTATIN CALCIUM 10 MG/1
10 TABLET, FILM COATED ORAL NIGHTLY
Qty: 90 TABLET | Refills: 1 | Status: SHIPPED | OUTPATIENT
Start: 2023-03-09 | End: 2023-10-11 | Stop reason: SDUPTHER

## 2023-03-09 RX ORDER — NITROGLYCERIN 0.4 MG/1
0.4 TABLET SUBLINGUAL EVERY 5 MIN PRN
Qty: 30 TABLET | Refills: 0 | Status: SHIPPED | OUTPATIENT
Start: 2023-03-09 | End: 2023-10-11 | Stop reason: SDUPTHER

## 2023-03-09 RX ORDER — VALSARTAN AND HYDROCHLOROTHIAZIDE 320; 25 MG/1; MG/1
1 TABLET, FILM COATED ORAL DAILY
Qty: 90 TABLET | Refills: 1 | Status: SHIPPED | OUTPATIENT
Start: 2023-03-09 | End: 2023-10-11 | Stop reason: SDUPTHER

## 2023-03-09 NOTE — PROGRESS NOTES
Subjective:   Patient ID:  Kendrick Moreno is a 42 y.o. male who presents for cardiac consult of No chief complaint on file.    Referring Physician:    Teri Rich MD [[id number]] [id number] [address], [place] [address]      Reason for consult: BLAS    HPI  The patient came in today for cardiac consult of No chief complaint on file.    3/9/23  Kendrick Moreno is a 42 y.o. male pt with HTN, obesity, ENRIQUE, anxiety, tobacco use presents for CV eval of BLAS.     Pt has been having CP, arm tightness checked BP it was 200s/100s and went to Dr. Rich    - Patient is here after long time,   He says he has been to the ER with extremely high blood pressure episode systolic up in the 220s, this was accompanied by chest pain.    Patient says over the last 6 months he has been short of breath with slight exertion and he gets chest pain.  He has been extremely tired fatigued no energy and can fall asleep easily.    Denies any orthopnea  He denies any weight gain   Occasional alcohol use and does weight but is giving that up also soon.  He was given lisinopril at home but he is not brought any home readings.  He has some tingling numbness of his fingers also.    He is under a lot of stress for about a month or 2 going through some legal battles    Bp here is 140/91. He has on going SOB . ECG NSR.     Patient feels no leg swelling, no PND, no palpitation, no dizziness, no syncope, no CNS symptoms.    Patient has fairly good exercise tolerance. He is a contractor, usually busy.     Patient is compliant with medications.    FH - grandfathers - had MI    Results for orders placed during the hospital encounter of 01/24/23    Nuclear Stress - Cardiology Interpreted    Interpretation Summary    Normal myocardial perfusion scan. There is no evidence of myocardial ischemia or infarction.    There is a  mild intensity fixed perfusion abnormality in the inferolateral wall of the left ventricle secondary to diaphragm attenuation.    The gated  perfusion images showed an ejection fraction of 68% at rest. The gated perfusion images showed an ejection fraction of 68% post stress.    The ECG portion of the study is negative for ischemia.    The patient reported no chest pain during the stress test.    There were no arrhythmias during stress.      Normal sinus rhythm   Normal ECG   When compared with ECG of 16-NOV-2016 14:37,   No significant change was found   Confirmed by MD GABINO, ABILIO ([id number]) on 1/12/2023 8:25:33 AM     Referred By: MD LANI GARCIA           Confirmed By:ABILIO LLOYD MD  History reviewed. No pertinent past medical history.    History reviewed. No pertinent surgical history.    Social History     Tobacco Use    Smoking status: Light Smoker     Packs/day: 0.25     Years: 1.00     Pack years: 0.25     Types: Cigars, Cigarettes    Smokeless tobacco: Never    Tobacco comments:     cigars   Substance Use Topics    Alcohol use: Yes    Drug use: No       History reviewed. No pertinent family history.    Patient's Medications   New Prescriptions    ASPIRIN (ECOTRIN) 81 MG EC TABLET    Take 1 tablet (81 mg total) by mouth once daily.    ATORVASTATIN (LIPITOR) 10 MG TABLET    Take 1 tablet (10 mg total) by mouth every evening.    NITROGLYCERIN (NITROSTAT) 0.4 MG SL TABLET    Place 1 tablet (0.4 mg total) under the tongue every 5 (five) minutes as needed for Chest pain. After 3 dose need to call EMS    VALSARTAN-HYDROCHLOROTHIAZIDE (DIOVAN-HCT) 320-25 MG PER TABLET    Take 1 tablet by mouth once daily.   Previous Medications    CITALOPRAM (CELEXA) 20 MG TABLET    Take 1 tablet (20 mg total) by mouth once daily.    IBUPROFEN (ADVIL,MOTRIN) 800 MG TABLET    Take 800 mg by mouth every 6 (six) hours as needed.    TRIAZOLAM (HALCION) 0.25 MG TAB    Take 0.25 mg by mouth.   Modified Medications    No medications on file   Discontinued Medications    VALSARTAN-HYDROCHLOROTHIAZIDE (DIOVAN-HCT) 160-12.5 MG PER TABLET    Take 1 tablet by mouth once daily.  "      Review of Systems   Constitutional: Negative.    HENT: Negative.     Eyes: Negative.    Respiratory:  Positive for shortness of breath.    Cardiovascular: Negative.    Gastrointestinal: Negative.    Genitourinary: Negative.    Musculoskeletal: Negative.    Skin: Negative.    Neurological: Negative.    Endo/Heme/Allergies: Negative.    Psychiatric/Behavioral: Negative.     All 12 systems otherwise negative.    Wt Readings from Last 3 Encounters:   03/09/23 (!) 141.5 kg (311 lb 15.2 oz)   02/23/23 (!) 143.3 kg (315 lb 14.7 oz)   01/10/23 (!) 144.4 kg (318 lb 7.3 oz)     Temp Readings from Last 3 Encounters:   01/10/23 97.4 °F (36.3 °C) (Tympanic)   11/16/16 98.1 °F (36.7 °C) (Oral)   11/09/16 97.2 °F (36.2 °C) (Tympanic)     BP Readings from Last 3 Encounters:   03/09/23 (!) 140/91   02/23/23 120/84   01/10/23 (!) 150/94     Pulse Readings from Last 3 Encounters:   03/09/23 81   02/23/23 76   01/10/23 75       BP (!) 140/91   Pulse 81   Ht 6' 2" (1.88 m)   Wt (!) 141.5 kg (311 lb 15.2 oz)   SpO2 95%   BMI 40.05 kg/m²     Objective:   Physical Exam  Vitals and nursing note reviewed.   Constitutional:       General: He is not in acute distress.     Appearance: He is well-developed. He is obese. He is not diaphoretic.   HENT:      Head: Normocephalic and atraumatic.      Nose: Nose normal.   Eyes:      General: No scleral icterus.     Conjunctiva/sclera: Conjunctivae normal.   Neck:      Thyroid: No thyromegaly.      Vascular: No JVD.   Cardiovascular:      Rate and Rhythm: Normal rate and regular rhythm.      Heart sounds: S1 normal and S2 normal. No murmur heard.    No friction rub. No gallop. No S3 or S4 sounds.   Pulmonary:      Effort: Pulmonary effort is normal. No respiratory distress.      Breath sounds: Normal breath sounds. No stridor. No wheezing or rales.   Chest:      Chest wall: No tenderness.   Abdominal:      General: Bowel sounds are normal. There is no distension.      Palpations: Abdomen is " soft. There is no mass.      Tenderness: There is no abdominal tenderness. There is no rebound.   Genitourinary:     Comments: Deferred  Musculoskeletal:         General: No tenderness or deformity. Normal range of motion.      Cervical back: Normal range of motion and neck supple.   Lymphadenopathy:      Cervical: No cervical adenopathy.   Skin:     General: Skin is warm and dry.      Coloration: Skin is not pale.      Findings: No erythema or rash.   Neurological:      Mental Status: He is alert and oriented to person, place, and time.      Motor: No abnormal muscle tone.      Coordination: Coordination normal.   Psychiatric:         Behavior: Behavior normal.         Thought Content: Thought content normal.         Judgment: Judgment normal.       Lab Results   Component Value Date     01/11/2023    K 4.3 01/11/2023     01/11/2023    CO2 19 (L) 01/11/2023    BUN 12 01/11/2023    CREATININE 0.8 01/11/2023    GLU 82 01/11/2023    HGBA1C 4.7 01/11/2023    AST 25 01/11/2023    ALT 27 01/11/2023    ALBUMIN 4.5 01/11/2023    PROT 7.7 01/11/2023    BILITOT 1.0 01/11/2023    WBC 10.78 01/11/2023    HGB 17.0 01/11/2023    HCT 50.5 01/11/2023    MCV 94 01/11/2023     01/11/2023    TSH 1.179 01/11/2023    CHOL 206 (H) 01/11/2023    HDL 29 (L) 01/11/2023    LDLCALC 146.4 01/11/2023    TRIG 153 (H) 01/11/2023    BNP <10 01/11/2023     Assessment:      1. BLAS (dyspnea on exertion)    2. Class 3 severe obesity with serious comorbidity and body mass index (BMI) of 40.0 to 44.9 in adult, unspecified obesity type    3. Obstructive sleep apnea    4. Essential hypertension    5. Other chest pain    6. Palpitations        Plan:       CP/BLAS, palpitations  - concern for angina - will need R/LHC  - order 48 HR Holter   - order ECHO  - PRN NTG    2. HTN  - titrate meds  - increase ARB/HCTZ    3. HLD  - start statin    4. Obesity  - cont weight loss     5. ENRIQUE sx   - has sleep study pending       Thank you for allowing  me to participate in this patient's care. Please do not hesitate to contact me with any questions or concerns. Consult note has been forwarded to the referral physician.

## 2023-03-09 NOTE — TELEPHONE ENCOUNTER
----- Message from Miguelangel Lopez MD sent at 3/9/2023  3:46 PM CST -----  Regarding: R/LHC for next week or week after  Bahij can you add him on next week or two for R/LHC for worsening angina/CP? Thanks

## 2023-03-10 ENCOUNTER — TELEPHONE (OUTPATIENT)
Dept: CARDIOLOGY | Facility: CLINIC | Age: 43
End: 2023-03-10
Payer: MEDICAID

## 2023-03-10 NOTE — TELEPHONE ENCOUNTER
Telephoned patient to advise of need to complete Echo/holter before Insurance would approve 3/20 scheduled Heart cath and rescheduled both studies to 3/14  Patient requested to keep studies on 3/22 and delay Heart cath from 3/20  Advised if chest pain persist to report to ER and rescheduled appointments as requested and cancelled heart cath for 3/20

## 2023-03-22 ENCOUNTER — HOSPITAL ENCOUNTER (OUTPATIENT)
Dept: CARDIOLOGY | Facility: HOSPITAL | Age: 43
Discharge: HOME | End: 2023-03-22
Attending: INTERNAL MEDICINE
Payer: MEDICAID

## 2023-03-22 VITALS
HEIGHT: 74 IN | BODY MASS INDEX: 39.91 KG/M2 | WEIGHT: 311 LBS | DIASTOLIC BLOOD PRESSURE: 91 MMHG | SYSTOLIC BLOOD PRESSURE: 140 MMHG

## 2023-03-22 DIAGNOSIS — G47.33 OBSTRUCTIVE SLEEP APNEA: ICD-10-CM

## 2023-03-22 DIAGNOSIS — E66.01 CLASS 3 SEVERE OBESITY WITH SERIOUS COMORBIDITY AND BODY MASS INDEX (BMI) OF 40.0 TO 44.9 IN ADULT, UNSPECIFIED OBESITY TYPE: ICD-10-CM

## 2023-03-22 DIAGNOSIS — R06.09 DOE (DYSPNEA ON EXERTION): ICD-10-CM

## 2023-03-22 DIAGNOSIS — I10 ESSENTIAL HYPERTENSION: ICD-10-CM

## 2023-03-22 DIAGNOSIS — R00.2 PALPITATIONS: ICD-10-CM

## 2023-03-22 DIAGNOSIS — R07.89 OTHER CHEST PAIN: ICD-10-CM

## 2023-03-22 LAB
AORTIC ROOT ANNULUS: 3.29 CM
ASCENDING AORTA: 2.85 CM
AV INDEX (PROSTH): 0.8
AV MEAN GRADIENT: 4 MMHG
AV PEAK GRADIENT: 8 MMHG
AV VALVE AREA: 2.6 CM2
AV VELOCITY RATIO: 0.81
BSA FOR ECHO PROCEDURE: 2.71 M2
CV ECHO LV RWT: 0.83 CM
DOP CALC AO PEAK VEL: 1.41 M/S
DOP CALC AO VTI: 27.1 CM
DOP CALC LVOT AREA: 3.2 CM2
DOP CALC LVOT DIAMETER: 2.03 CM
DOP CALC LVOT PEAK VEL: 1.14 M/S
DOP CALC LVOT STROKE VOLUME: 70.52 CM3
DOP CALC MV VTI: 15.1 CM
DOP CALCLVOT PEAK VEL VTI: 21.8 CM
E WAVE DECELERATION TIME: 208.4 MSEC
E/A RATIO: 0.85
E/E' RATIO: 3.85 M/S
ECHO LV POSTERIOR WALL: 1.54 CM (ref 0.6–1.1)
EJECTION FRACTION: 60 %
FRACTIONAL SHORTENING: 25 % (ref 28–44)
INTERVENTRICULAR SEPTUM: 1.55 CM (ref 0.6–1.1)
IVC DIAMETER: 1.68 CM
IVRT: 71.36 MSEC
LA MAJOR: 4.95 CM
LA MINOR: 5.07 CM
LA WIDTH: 3.6 CM
LEFT ATRIUM SIZE: 4.23 CM
LEFT ATRIUM VOLUME INDEX: 24.7 ML/M2
LEFT ATRIUM VOLUME: 64.84 CM3
LEFT INTERNAL DIMENSION IN SYSTOLE: 2.78 CM (ref 2.1–4)
LEFT VENTRICLE DIASTOLIC VOLUME INDEX: 22.63 ML/M2
LEFT VENTRICLE DIASTOLIC VOLUME: 59.28 ML
LEFT VENTRICLE MASS INDEX: 84 G/M2
LEFT VENTRICLE SYSTOLIC VOLUME INDEX: 11.1 ML/M2
LEFT VENTRICLE SYSTOLIC VOLUME: 29.12 ML
LEFT VENTRICULAR INTERNAL DIMENSION IN DIASTOLE: 3.73 CM (ref 3.5–6)
LEFT VENTRICULAR MASS: 221.35 G
LV LATERAL E/E' RATIO: 3.25 M/S
LV SEPTAL E/E' RATIO: 4.73 M/S
LVOT MG: 2.93 MMHG
LVOT MV: 0.81 CM/S
MV MEAN GRADIENT: 1 MMHG
MV PEAK A VEL: 0.61 M/S
MV PEAK E VEL: 0.52 M/S
MV PEAK GRADIENT: 2 MMHG
MV VALVE AREA BY CONTINUITY EQUATION: 4.67 CM2
PV MV: 0.71 M/S
PV PEAK VELOCITY: 0.93 CM/S
RA MAJOR: 4.62 CM
RA WIDTH: 3.32 CM
RIGHT VENTRICULAR END-DIASTOLIC DIMENSION: 3.3 CM
SINUS: 3.18 CM
STJ: 2.53 CM
TDI LATERAL: 0.16 M/S
TDI SEPTAL: 0.11 M/S
TDI: 0.14 M/S
TRICUSPID ANNULAR PLANE SYSTOLIC EXCURSION: 1.83 CM

## 2023-03-22 PROCEDURE — 93306 ECHO (CUPID ONLY): ICD-10-PCS | Mod: 26,,, | Performed by: INTERNAL MEDICINE

## 2023-03-22 PROCEDURE — 93306 TTE W/DOPPLER COMPLETE: CPT | Mod: 26,,, | Performed by: INTERNAL MEDICINE

## 2023-03-22 PROCEDURE — 93227 HOLTER MONITOR - 48 HOUR (CUPID ONLY): ICD-10-PCS | Mod: ,,, | Performed by: INTERNAL MEDICINE

## 2023-03-22 PROCEDURE — 93306 TTE W/DOPPLER COMPLETE: CPT

## 2023-03-22 PROCEDURE — 93225 XTRNL ECG REC<48 HRS REC: CPT

## 2023-03-22 PROCEDURE — 93227 XTRNL ECG REC<48 HR R&I: CPT | Mod: ,,, | Performed by: INTERNAL MEDICINE

## 2023-03-24 LAB
OHS CV EVENT MONITOR DAY: 0
OHS CV HOLTER LENGTH DECIMAL HOURS: 48
OHS CV HOLTER LENGTH HOURS: 48
OHS CV HOLTER LENGTH MINUTES: 0
OHS CV HOLTER SINUS AVERAGE HR: 87
OHS CV HOLTER SINUS MAX HR: 132
OHS CV HOLTER SINUS MIN HR: 55

## 2023-04-27 ENCOUNTER — TELEPHONE (OUTPATIENT)
Dept: CARDIOLOGY | Facility: CLINIC | Age: 43
End: 2023-04-27
Payer: MEDICAID

## 2023-04-27 ENCOUNTER — PATIENT MESSAGE (OUTPATIENT)
Dept: CARDIOLOGY | Facility: CLINIC | Age: 43
End: 2023-04-27
Payer: MEDICAID

## 2023-04-27 NOTE — TELEPHONE ENCOUNTER
Informed pt     Dr. Lopez would like to see you sooner than June 14 to discuss your symptoms and Insurance requirements before scheduling heart cath but he says if your chest pain is severe please report to ER. The Cardiology Staff should be calling to give you an earlier appointment with Dr. Lopez          Pt verbalized understanding with no questions or concerns

## 2023-05-08 ENCOUNTER — PATIENT MESSAGE (OUTPATIENT)
Dept: PULMONOLOGY | Facility: CLINIC | Age: 43
End: 2023-05-08
Payer: MEDICAID

## 2023-05-09 ENCOUNTER — PATIENT MESSAGE (OUTPATIENT)
Dept: CARDIOLOGY | Facility: CLINIC | Age: 43
End: 2023-05-09

## 2023-05-15 ENCOUNTER — OFFICE VISIT (OUTPATIENT)
Dept: PULMONOLOGY | Facility: CLINIC | Age: 43
End: 2023-05-15
Payer: MEDICAID

## 2023-05-15 VITALS
SYSTOLIC BLOOD PRESSURE: 118 MMHG | WEIGHT: 310.06 LBS | DIASTOLIC BLOOD PRESSURE: 84 MMHG | BODY MASS INDEX: 39.79 KG/M2 | HEIGHT: 74 IN | RESPIRATION RATE: 18 BRPM | OXYGEN SATURATION: 97 % | HEART RATE: 90 BPM

## 2023-05-15 DIAGNOSIS — I10 ESSENTIAL HYPERTENSION: ICD-10-CM

## 2023-05-15 DIAGNOSIS — E66.01 CLASS 3 SEVERE OBESITY WITH SERIOUS COMORBIDITY AND BODY MASS INDEX (BMI) OF 40.0 TO 44.9 IN ADULT, UNSPECIFIED OBESITY TYPE: ICD-10-CM

## 2023-05-15 DIAGNOSIS — G47.33 OSA ON CPAP: Primary | ICD-10-CM

## 2023-05-15 PROCEDURE — 99999 PR PBB SHADOW E&M-EST. PATIENT-LVL III: CPT | Mod: PBBFAC,,, | Performed by: NURSE PRACTITIONER

## 2023-05-15 PROCEDURE — 99214 OFFICE O/P EST MOD 30 MIN: CPT | Mod: S$PBB,,, | Performed by: NURSE PRACTITIONER

## 2023-05-15 PROCEDURE — 3079F PR MOST RECENT DIASTOLIC BLOOD PRESSURE 80-89 MM HG: ICD-10-PCS | Mod: CPTII,,, | Performed by: NURSE PRACTITIONER

## 2023-05-15 PROCEDURE — 99213 OFFICE O/P EST LOW 20 MIN: CPT | Mod: PBBFAC | Performed by: NURSE PRACTITIONER

## 2023-05-15 PROCEDURE — 4010F ACE/ARB THERAPY RXD/TAKEN: CPT | Mod: CPTII,,, | Performed by: NURSE PRACTITIONER

## 2023-05-15 PROCEDURE — 1160F PR REVIEW ALL MEDS BY PRESCRIBER/CLIN PHARMACIST DOCUMENTED: ICD-10-PCS | Mod: CPTII,,, | Performed by: NURSE PRACTITIONER

## 2023-05-15 PROCEDURE — 3074F PR MOST RECENT SYSTOLIC BLOOD PRESSURE < 130 MM HG: ICD-10-PCS | Mod: CPTII,,, | Performed by: NURSE PRACTITIONER

## 2023-05-15 PROCEDURE — 1159F MED LIST DOCD IN RCRD: CPT | Mod: CPTII,,, | Performed by: NURSE PRACTITIONER

## 2023-05-15 PROCEDURE — 99214 PR OFFICE/OUTPT VISIT, EST, LEVL IV, 30-39 MIN: ICD-10-PCS | Mod: S$PBB,,, | Performed by: NURSE PRACTITIONER

## 2023-05-15 PROCEDURE — 1160F RVW MEDS BY RX/DR IN RCRD: CPT | Mod: CPTII,,, | Performed by: NURSE PRACTITIONER

## 2023-05-15 PROCEDURE — 99999 PR PBB SHADOW E&M-EST. PATIENT-LVL III: ICD-10-PCS | Mod: PBBFAC,,, | Performed by: NURSE PRACTITIONER

## 2023-05-15 PROCEDURE — 3079F DIAST BP 80-89 MM HG: CPT | Mod: CPTII,,, | Performed by: NURSE PRACTITIONER

## 2023-05-15 PROCEDURE — 3044F HG A1C LEVEL LT 7.0%: CPT | Mod: CPTII,,, | Performed by: NURSE PRACTITIONER

## 2023-05-15 PROCEDURE — 3044F PR MOST RECENT HEMOGLOBIN A1C LEVEL <7.0%: ICD-10-PCS | Mod: CPTII,,, | Performed by: NURSE PRACTITIONER

## 2023-05-15 PROCEDURE — 3008F PR BODY MASS INDEX (BMI) DOCUMENTED: ICD-10-PCS | Mod: CPTII,,, | Performed by: NURSE PRACTITIONER

## 2023-05-15 PROCEDURE — 3074F SYST BP LT 130 MM HG: CPT | Mod: CPTII,,, | Performed by: NURSE PRACTITIONER

## 2023-05-15 PROCEDURE — 4010F PR ACE/ARB THEARPY RXD/TAKEN: ICD-10-PCS | Mod: CPTII,,, | Performed by: NURSE PRACTITIONER

## 2023-05-15 PROCEDURE — 1159F PR MEDICATION LIST DOCUMENTED IN MEDICAL RECORD: ICD-10-PCS | Mod: CPTII,,, | Performed by: NURSE PRACTITIONER

## 2023-05-15 PROCEDURE — 3008F BODY MASS INDEX DOCD: CPT | Mod: CPTII,,, | Performed by: NURSE PRACTITIONER

## 2023-05-15 NOTE — PROGRESS NOTES
Subjective:      Patient ID: Kendrick Moreno is a 43 y.o. male.    Patient Active Problem List   Diagnosis    ENRIQUE on CPAP    Class 3 severe obesity with serious comorbidity and body mass index (BMI) of 40.0 to 44.9 in adult    Essential hypertension       he has been referred by No ref. provider found for evaluation and management for   Chief Complaint   Patient presents with    Sleep Apnea       Chief Complaint: Sleep Apnea      HPI:  HPI: Kendrick Moreno is here for follow up for ENRIQUE and CPAP complaince assessment.   He is on Auto CPAP of 5-20 cmH2O pressure which is optimally controlling sleep apnea with apneic index (AHI) 2.0 events an hour.   Complaince download today reveals 0% of days with greater than 4 hours of device use. Struggling with CPAP use with improper fitting mask to tight on face and pressure too low to start, feels not enough air. Request 8 cm starting pressure. Once air pressure above 5 then okay with air pressures.   Patient reports benefit from CPAP use and denies snoring and excessive daytime sleepiness.  Patient reports complaint of dream wear nasal mask medium head gear is too small. And would like more air at start up. Changed in clinic to Auto CPAP 8-20 cm  . Dream wear nasal mask is used.     03/30/2023 Resmed set up    02/02/2023 Home Sleep Study mild to moderate obstructive sleep apnea AHI 8.0. RDI 21.    Compliance Report  Compliance  Payor Standard  Usage 04/11/2023 - 05/10/2023  Usage days 16/30 days (53%)  >= 4 hours 0 days (0%)  < 4 hours 16 days (53%)  Usage hours 33 hours 9 minutes  Average usage (total days) 1 hours 6 minutes  Average usage (days used) 2 hours 4 minutes  Median usage (days used) 2 hours 19 minutes  Total used hours (value since last reset - 05/10/2023) 43 hours  AirSense 11 AutoSet  Serial number [id number]  Mode AutoSet  Min Pressure 5 cmH2O  Max Pressure 20 cmH2O  EPR Fulltime  EPR level 3  Response Standard  Therapy  Pressure - cmH2O Median: 6.8 95th percentile:  9.3 Maximum: 10.0  Leaks - L/min Median: 0.0 95th percentile: 2.0 Maximum: 11.5  Events per hour AI: 0.2 HI: 0.0 AHI: 0.2  Apnea Index Central: 0.0 Obstructive: 0.1 Unknown: 0.0  RERA Index 0.0    [place] Sleepiness Scale   EPWORTH SLEEPINESS SCALE 5/15/2023 2/22/2023 1/10/2023   Sitting and reading 3 3 2   Watching TV 3 3 3   Sitting, inactive in a public place (e.g. a theatre or a meeting) 2 2 1   As a passenger in a car for an hour without a break 2 3 2   Lying down to rest in the afternoon when circumstances permit 2 2 3   Sitting and talking to someone 1 1 1   Sitting quietly after a lunch without alcohol 2 2 2   In a car, while stopped for a few minutes in traffic 1 1 1   Total score 16 17 15      Previous Report Reviewed: lab reports and office notes     Past Medical History: The following portions of the patient's history were reviewed and updated as appropriate:   He  has no past surgical history on file.  His family history is not on file.  He  reports that he has been smoking cigars and cigarettes. He has a 0.25 pack-year smoking history. He has never used smokeless tobacco. He reports current alcohol use. He reports that he does not use drugs.  He has a current medication list which includes the following prescription(s): aspirin, atorvastatin, citalopram, nitroglycerin, and valsartan-hydrochlorothiazide.  He is allergic to keflex [cephalexin]..    Review of Systems   Constitutional:  Negative for fever, chills, weight loss, weight gain, activity change, appetite change, fatigue and night sweats.   HENT:  Negative for postnasal drip, rhinorrhea, sinus pressure, voice change and congestion.    Eyes:  Negative for redness and itching.   Respiratory:  Negative for snoring, cough, sputum production, chest tightness, shortness of breath, wheezing, orthopnea, asthma nighttime symptoms, dyspnea on extertion, use of rescue inhaler and somnolence.    Cardiovascular: Negative.  Negative for chest pain,  "palpitations and leg swelling.   Genitourinary:  Negative for difficulty urinating and hematuria.   Endocrine:  Negative for cold intolerance and heat intolerance.    Musculoskeletal:  Negative for arthralgias, gait problem, joint swelling and myalgias.   Skin: Negative.    Gastrointestinal:  Negative for nausea, vomiting, abdominal pain and acid reflux.   Neurological:  Negative for dizziness, weakness, light-headedness and headaches.   Hematological:  Negative for adenopathy. No excessive bruising.   All other systems reviewed and are negative.   Objective:   /84   Pulse 90   Resp 18   Ht 6' 2" (1.88 m)   Wt (!) 140.6 kg (310 lb 1.2 oz)   SpO2 97%   BMI 39.81 kg/m²   Physical Exam  Vitals and nursing note reviewed.   Constitutional:       General: He is not in acute distress.     Appearance: Normal appearance. He is well-developed. He is not ill-appearing or toxic-appearing.   HENT:      Head: Normocephalic and atraumatic.      Right Ear: External ear normal.      Left Ear: External ear normal.      Nose: Nose normal.      Mouth/Throat:      Mouth: Mucous membranes are moist.      Pharynx: Oropharynx is clear. No oropharyngeal exudate.   Eyes:      Conjunctiva/sclera: Conjunctivae normal.   Cardiovascular:      Rate and Rhythm: Normal rate and regular rhythm.      Heart sounds: Normal heart sounds.   Pulmonary:      Effort: Pulmonary effort is normal.      Breath sounds: Normal breath sounds.   Abdominal:      Palpations: Abdomen is soft.   Musculoskeletal:      Cervical back: Normal range of motion and neck supple.   Skin:     General: Skin is warm and dry.   Neurological:      Mental Status: He is alert and oriented to person, place, and time.   Psychiatric:         Behavior: Behavior normal. Behavior is cooperative.         Thought Content: Thought content normal.         Judgment: Judgment normal.       Personal Diagnostic Review  Review of labs, xray's, cardiology reports.     02/02/2023 Home " Sleep Study mild to moderate obstructive sleep apnea AHI 8.0. RDI 21  Assessment:     1. ENRIQUE on CPAP    2. Class 3 severe obesity with serious comorbidity and body mass index (BMI) of 40.0 to 44.9 in adult, unspecified obesity type    3. Essential hypertension        Orders Placed This Encounter   Procedures    CPAP/BIPAP SUPPLIES     Benefits and compliant  90 day supply. 4 refills  HME: [facility]    Patient request extra long 10 foot tubing. Thank you     Order Specific Question:   Length of need (1-99 months):     Answer:   99     Order Specific Question:   Choose ONE mask type and its corresponding cushions and/or pillows:     Answer:    Nasal Mask, 1 per 90 days:  Nasal Cushions, (6 per 90 days):  Nasal Pillows, (6 per 90 days)     Order Specific Question:   Choose EITHER Heated or Non-Heated Tubjing     Answer:    Non-Heated Tubing, 1 per 90 days     Order Specific Question:   Number of Days Needed:     Answer:   99     Order Specific Question:   All other supplies as needed as listed below:     Answer:    Headgear, 1 per 180 days     Order Specific Question:   All other supplies as needed as listed below:     Answer:    Chin Strap, 1 per 180 days     Order Specific Question:   All other supplies as needed as listed below:     Answer:    Disposable Filter, 6 per 90 days     Order Specific Question:   All other supplies as needed as listed below:     Answer:    Humidifier Chamber, 1 per 180 days     Order Specific Question:   All other supplies as needed as listed below:     Answer:    Non-Disposable Filter, 1 per 180 days     Plan:   Discussed diagnosis, its evaluation, treatment and usual course. All questions answered.  Problem List Items Addressed This Visit       ENRIQUE on CPAP - Primary     02/02/2023 Home Sleep Study mild to moderate obstructive sleep apnea AHI 8.0. RDI 21.  Resmed set up  03/30/2023    On Auto CPAP 5-20 cm with optimal control  Nasal mask   HME:  [facility]  Up dated supply order with 10 ft tubing requested  Follow up 2 weeks not compliant IDL         Relevant Orders    CPAP/BIPAP SUPPLIES    Essential hypertension     Stable and controlled. Continue current treatment plan as previously prescribed with your PCP.              Class 3 severe obesity with serious comorbidity and body mass index (BMI) of 40.0 to 44.9 in adult     Encouraged calorie reduction and 30 minutes of exercise daily. Discussed impact of obesity on general health.  Wt Readings from Last 9 Encounters:   05/15/23 (!) 140.6 kg (310 lb 1.2 oz)   03/22/23 (!) 141.1 kg (311 lb)   03/09/23 (!) 141.5 kg (311 lb 15.2 oz)   02/23/23 (!) 143.3 kg (315 lb 14.7 oz)   01/10/23 (!) 144.4 kg (318 lb 7.3 oz)   11/16/16 (!) 140.2 kg (309 lb)   11/09/16 133.3 kg (293 lb 14 oz)   12/18/15 131.9 kg (290 lb 12.6 oz)   12/15/15 132.3 kg (291 lb 10.7 oz)    Body mass index is 39.81 kg/m².                Follow up in about 8 weeks (around 7/10/2023) for CPAP compliance download not compliant at initial.    Thank you for the opportunity to participate in the care of this patient.

## 2023-05-15 NOTE — ASSESSMENT & PLAN NOTE
Encouraged calorie reduction and 30 minutes of exercise daily. Discussed impact of obesity on general health.  Wt Readings from Last 9 Encounters:   05/15/23 (!) 140.6 kg (310 lb 1.2 oz)   03/22/23 (!) 141.1 kg (311 lb)   03/09/23 (!) 141.5 kg (311 lb 15.2 oz)   02/23/23 (!) 143.3 kg (315 lb 14.7 oz)   01/10/23 (!) 144.4 kg (318 lb 7.3 oz)   11/16/16 (!) 140.2 kg (309 lb)   11/09/16 133.3 kg (293 lb 14 oz)   12/18/15 131.9 kg (290 lb 12.6 oz)   12/15/15 132.3 kg (291 lb 10.7 oz)    Body mass index is 39.81 kg/m².

## 2023-05-15 NOTE — ASSESSMENT & PLAN NOTE
02/02/2023 Home Sleep Study mild to moderate obstructive sleep apnea AHI 8.0. RDI 21.  Resmed set up  03/30/2023    On Auto CPAP 5-20 cm with optimal control  Nasal mask   HME: [facility]  Up dated supply order with 10 ft tubing requested  Follow up 2 weeks not compliant IDL

## 2023-08-03 ENCOUNTER — TELEPHONE (OUTPATIENT)
Dept: CARDIOTHORACIC SURGERY | Facility: CLINIC | Age: 43
End: 2023-08-03
Payer: MEDICAID

## 2023-08-03 ENCOUNTER — TELEPHONE (OUTPATIENT)
Dept: FAMILY MEDICINE | Facility: CLINIC | Age: 43
End: 2023-08-03
Payer: MEDICAID

## 2023-08-03 NOTE — TELEPHONE ENCOUNTER
----- Message from Kayla Miranda sent at 8/3/2023 10:27 AM CDT -----  Regarding: pt request  Name of Who is Calling:CRYSTAL RUDD [[id number]]          What is the request in detail: pt would like schedule a appt due to heart issues          Can the clinic reply by [facility]: no           [facility]: [phone] (home) [phone] (work)

## 2023-08-03 NOTE — TELEPHONE ENCOUNTER
Called and spoke with pt regarding rescheduling his 3 mo f/u appt. Pt was informed Dr. Lopez is out for 2 weeks and asked if he will be ok with seeing the NP. Pt states that is ok with him. Pt accepted the first available appointment with NP on Wednesday 8/16/23 @ 3:30 PM. Pt asked to be added to the wait list just in case a sooner appt. becomes available. Pt asked what will be Dr. Lopez first available (Wednesday 10/11/23 @4:40 PM). Pt wanted to schedule both appts with NP and Dr. Lopez to see how he is doing in between that time and he didn't want to miss the first availability to see the NP. Pt states he has been expericing leg pain which he described as vibration from his leg down to his foot that he thinks might be a possible blockage. Pt states pain is urgent but not ER urgent.       ----- Message from Kayla Miranda sent at 8/3/2023 10:23 AM CDT -----  Regarding: pt request  Name of Who is Calling:CRYSTAL RUDD [[id number]]          What is the request in detail: pt called to reschedule his cancelled 3 month follow up appt           Can the clinic reply by [facility]: no           [facility] [phone] (home) [phone] (work)

## 2023-08-03 NOTE — TELEPHONE ENCOUNTER
"Spoke with patient and he is asking to be added onto the schedule tomorrow for chest pain (he says he always has) and leg "vibration & numbness "  We are over booked 1 patient   Please advise   "

## 2023-08-03 NOTE — TELEPHONE ENCOUNTER
If he is having chest pain currently needs to call the ambulance.    If we full for tomorrow we could probably were came in Monday

## 2023-08-07 ENCOUNTER — TELEPHONE (OUTPATIENT)
Dept: CARDIOLOGY | Facility: CLINIC | Age: 43
End: 2023-08-07
Payer: MEDICAID

## 2023-08-07 NOTE — TELEPHONE ENCOUNTER
Spoke to patient about sooner appt. Told him the dr is out of the office at the moment and that he has an appointment next week to see another provider KA    ----- Message from Renaldo Dorsey sent at 8/7/2023  7:49 AM CDT -----  Contact: Pt [phone]  Caller is requesting an earlier appointment then we can schedule.  Caller is requesting a message be sent to the provider.  If this is for urgent care symptoms, did you offer other providers at this location, providers at other locations, or [facility]? (yes, no, n/a):  Yes  If this is for the patients physical, did you offer to schedule next available and put on wait list, or to see NP or PA for their physical?  (yes, no, n/a):  Yes  When is the next available appointment with their provider:  n/a  Reason for the appointment:  Discuss concerns  Patient preference of timeframe to be scheduled:  with a month  Would the patient like a call back, or a response through their [facility] portal?:   call  Comments:

## 2023-08-07 NOTE — TELEPHONE ENCOUNTER
Called and spoke with pt regarding his appointment with Dr. Lopez. Pt appt wasn't schedule in his Mychart. Appointment was scheduled and confirmed.

## 2023-08-16 ENCOUNTER — PATIENT MESSAGE (OUTPATIENT)
Dept: CARDIOLOGY | Facility: CLINIC | Age: 43
End: 2023-08-16
Payer: MEDICAID

## 2023-09-05 ENCOUNTER — PATIENT MESSAGE (OUTPATIENT)
Dept: FAMILY MEDICINE | Facility: CLINIC | Age: 43
End: 2023-09-05
Payer: MEDICAID

## 2023-09-05 DIAGNOSIS — U07.1 ACUTE COVID-19: Primary | ICD-10-CM

## 2023-09-12 ENCOUNTER — OFFICE VISIT (OUTPATIENT)
Dept: OPHTHALMOLOGY | Facility: CLINIC | Age: 43
End: 2023-09-12
Payer: MEDICAID

## 2023-09-12 DIAGNOSIS — S05.02XA CONJUNCTIVAL ABRASION, LEFT, INITIAL ENCOUNTER: Primary | ICD-10-CM

## 2023-09-12 PROCEDURE — 1159F PR MEDICATION LIST DOCUMENTED IN MEDICAL RECORD: ICD-10-PCS | Mod: CPTII,,, | Performed by: OPTOMETRIST

## 2023-09-12 PROCEDURE — 4010F PR ACE/ARB THEARPY RXD/TAKEN: ICD-10-PCS | Mod: CPTII,,, | Performed by: OPTOMETRIST

## 2023-09-12 PROCEDURE — 99212 OFFICE O/P EST SF 10 MIN: CPT | Mod: PBBFAC | Performed by: OPTOMETRIST

## 2023-09-12 PROCEDURE — 4010F ACE/ARB THERAPY RXD/TAKEN: CPT | Mod: CPTII,,, | Performed by: OPTOMETRIST

## 2023-09-12 PROCEDURE — 3044F PR MOST RECENT HEMOGLOBIN A1C LEVEL <7.0%: ICD-10-PCS | Mod: CPTII,,, | Performed by: OPTOMETRIST

## 2023-09-12 PROCEDURE — 99999 PR PBB SHADOW E&M-EST. PATIENT-LVL II: ICD-10-PCS | Mod: PBBFAC,,, | Performed by: OPTOMETRIST

## 2023-09-12 PROCEDURE — 1159F MED LIST DOCD IN RCRD: CPT | Mod: CPTII,,, | Performed by: OPTOMETRIST

## 2023-09-12 PROCEDURE — 92002 PR EYE EXAM, NEW PATIENT,INTERMED: ICD-10-PCS | Mod: S$PBB,,, | Performed by: OPTOMETRIST

## 2023-09-12 PROCEDURE — 92002 INTRM OPH EXAM NEW PATIENT: CPT | Mod: S$PBB,,, | Performed by: OPTOMETRIST

## 2023-09-12 PROCEDURE — 99999 PR PBB SHADOW E&M-EST. PATIENT-LVL II: CPT | Mod: PBBFAC,,, | Performed by: OPTOMETRIST

## 2023-09-12 PROCEDURE — 3044F HG A1C LEVEL LT 7.0%: CPT | Mod: CPTII,,, | Performed by: OPTOMETRIST

## 2023-09-12 RX ORDER — ERYTHROMYCIN 5 MG/G
OINTMENT OPHTHALMIC 3 TIMES DAILY
Qty: 1 EACH | Refills: 0 | Status: SHIPPED | OUTPATIENT
Start: 2023-09-12 | End: 2023-09-15

## 2023-09-12 NOTE — PROGRESS NOTES
HPI     Eye Problem     Additional comments: OS           Comments    Patient states this past Sunday morning he woke up with left eye red and   felt like something was in it but could not find anything. No visual   complaints but painful when moving eye around.  Pain Scale:  5  Onset:   09/10/2023  OD, OS, OU:   OS  Discharge:  No   A.M. Matting:  No  Itch:   No  Redness:   Yes  Photophobia:   No  Foreign body sensation:   No  Deep pain:   No  Previous occurrence:   No  Drops: AF Tears OS PRN (Burn when using, has not used in the past 24   hours.)          Last edited by Noris Fields on 9/12/2023  9:14 AM.            Assessment /Plan     For exam results, see Encounter Report.    Conjunctival abrasion, left, initial encounter  -     erythromycin (ROMYCIN) ophthalmic ointment; Place into the left eye 3 (three) times daily. for 3 days  Dispense: 1 each; Refill: 0      Removed hair from superior puncta OS  Conj abrasion nasally    Emycin tid OS x 3 days    RTC prn worsening symptoms, decreased vision

## 2023-10-05 ENCOUNTER — PATIENT MESSAGE (OUTPATIENT)
Dept: PULMONOLOGY | Facility: CLINIC | Age: 43
End: 2023-10-05
Payer: MEDICAID

## 2023-10-10 DIAGNOSIS — R00.2 PALPITATIONS: Primary | ICD-10-CM

## 2023-10-11 ENCOUNTER — HOSPITAL ENCOUNTER (OUTPATIENT)
Dept: CARDIOLOGY | Facility: HOSPITAL | Age: 43
Discharge: HOME | End: 2023-10-11
Attending: INTERNAL MEDICINE
Payer: MEDICAID

## 2023-10-11 ENCOUNTER — OFFICE VISIT (OUTPATIENT)
Dept: CARDIOLOGY | Facility: CLINIC | Age: 43
End: 2023-10-11
Payer: MEDICAID

## 2023-10-11 VITALS
OXYGEN SATURATION: 99 % | WEIGHT: 294.75 LBS | HEART RATE: 86 BPM | HEIGHT: 74 IN | DIASTOLIC BLOOD PRESSURE: 92 MMHG | SYSTOLIC BLOOD PRESSURE: 132 MMHG | BODY MASS INDEX: 37.83 KG/M2

## 2023-10-11 DIAGNOSIS — G47.33 OSA ON CPAP: ICD-10-CM

## 2023-10-11 DIAGNOSIS — I73.9 CLAUDICATION: ICD-10-CM

## 2023-10-11 DIAGNOSIS — R06.09 DOE (DYSPNEA ON EXERTION): ICD-10-CM

## 2023-10-11 DIAGNOSIS — E78.49 OTHER HYPERLIPIDEMIA: ICD-10-CM

## 2023-10-11 DIAGNOSIS — R07.9 CHEST PAIN, UNSPECIFIED TYPE: Primary | ICD-10-CM

## 2023-10-11 DIAGNOSIS — R06.09 OTHER FORM OF DYSPNEA: ICD-10-CM

## 2023-10-11 DIAGNOSIS — R07.89 OTHER CHEST PAIN: ICD-10-CM

## 2023-10-11 DIAGNOSIS — G47.33 OBSTRUCTIVE SLEEP APNEA: ICD-10-CM

## 2023-10-11 DIAGNOSIS — I10 ESSENTIAL HYPERTENSION: ICD-10-CM

## 2023-10-11 DIAGNOSIS — R00.2 PALPITATIONS: ICD-10-CM

## 2023-10-11 DIAGNOSIS — Z13.6 ENCOUNTER FOR SCREENING FOR CARDIOVASCULAR DISORDERS: ICD-10-CM

## 2023-10-11 PROCEDURE — 3075F PR MOST RECENT SYSTOLIC BLOOD PRESS GE 130-139MM HG: ICD-10-PCS | Mod: CPTII,,, | Performed by: INTERNAL MEDICINE

## 2023-10-11 PROCEDURE — 3008F PR BODY MASS INDEX (BMI) DOCUMENTED: ICD-10-PCS | Mod: CPTII,,, | Performed by: INTERNAL MEDICINE

## 2023-10-11 PROCEDURE — 99214 OFFICE O/P EST MOD 30 MIN: CPT | Mod: PBBFAC | Performed by: INTERNAL MEDICINE

## 2023-10-11 PROCEDURE — 1160F PR REVIEW ALL MEDS BY PRESCRIBER/CLIN PHARMACIST DOCUMENTED: ICD-10-PCS | Mod: CPTII,,, | Performed by: INTERNAL MEDICINE

## 2023-10-11 PROCEDURE — 3008F BODY MASS INDEX DOCD: CPT | Mod: CPTII,,, | Performed by: INTERNAL MEDICINE

## 2023-10-11 PROCEDURE — 3080F DIAST BP >= 90 MM HG: CPT | Mod: CPTII,,, | Performed by: INTERNAL MEDICINE

## 2023-10-11 PROCEDURE — 3080F PR MOST RECENT DIASTOLIC BLOOD PRESSURE >= 90 MM HG: ICD-10-PCS | Mod: CPTII,,, | Performed by: INTERNAL MEDICINE

## 2023-10-11 PROCEDURE — 99999 PR PBB SHADOW E&M-EST. PATIENT-LVL IV: ICD-10-PCS | Mod: PBBFAC,,, | Performed by: INTERNAL MEDICINE

## 2023-10-11 PROCEDURE — 99215 PR OFFICE/OUTPT VISIT, EST, LEVL V, 40-54 MIN: ICD-10-PCS | Mod: S$PBB,,, | Performed by: INTERNAL MEDICINE

## 2023-10-11 PROCEDURE — 1160F RVW MEDS BY RX/DR IN RCRD: CPT | Mod: CPTII,,, | Performed by: INTERNAL MEDICINE

## 2023-10-11 PROCEDURE — 93010 ELECTROCARDIOGRAM REPORT: CPT | Mod: ,,, | Performed by: INTERNAL MEDICINE

## 2023-10-11 PROCEDURE — 1159F MED LIST DOCD IN RCRD: CPT | Mod: CPTII,,, | Performed by: INTERNAL MEDICINE

## 2023-10-11 PROCEDURE — 4010F ACE/ARB THERAPY RXD/TAKEN: CPT | Mod: CPTII,,, | Performed by: INTERNAL MEDICINE

## 2023-10-11 PROCEDURE — 99999 PR PBB SHADOW E&M-EST. PATIENT-LVL IV: CPT | Mod: PBBFAC,,, | Performed by: INTERNAL MEDICINE

## 2023-10-11 PROCEDURE — 4010F PR ACE/ARB THEARPY RXD/TAKEN: ICD-10-PCS | Mod: CPTII,,, | Performed by: INTERNAL MEDICINE

## 2023-10-11 PROCEDURE — 93010 EKG 12-LEAD: ICD-10-PCS | Mod: ,,, | Performed by: INTERNAL MEDICINE

## 2023-10-11 PROCEDURE — 3044F PR MOST RECENT HEMOGLOBIN A1C LEVEL <7.0%: ICD-10-PCS | Mod: CPTII,,, | Performed by: INTERNAL MEDICINE

## 2023-10-11 PROCEDURE — 1159F PR MEDICATION LIST DOCUMENTED IN MEDICAL RECORD: ICD-10-PCS | Mod: CPTII,,, | Performed by: INTERNAL MEDICINE

## 2023-10-11 PROCEDURE — 99215 OFFICE O/P EST HI 40 MIN: CPT | Mod: S$PBB,,, | Performed by: INTERNAL MEDICINE

## 2023-10-11 PROCEDURE — 3044F HG A1C LEVEL LT 7.0%: CPT | Mod: CPTII,,, | Performed by: INTERNAL MEDICINE

## 2023-10-11 PROCEDURE — 3075F SYST BP GE 130 - 139MM HG: CPT | Mod: CPTII,,, | Performed by: INTERNAL MEDICINE

## 2023-10-11 PROCEDURE — 93005 ELECTROCARDIOGRAM TRACING: CPT

## 2023-10-11 RX ORDER — ASPIRIN 81 MG/1
81 TABLET ORAL DAILY
Qty: 90 TABLET | Refills: 1 | Status: SHIPPED | OUTPATIENT
Start: 2023-10-11 | End: 2024-10-10

## 2023-10-11 RX ORDER — CARVEDILOL 6.25 MG/1
6.25 TABLET ORAL 2 TIMES DAILY WITH MEALS
Qty: 180 TABLET | Refills: 1 | Status: SHIPPED | OUTPATIENT
Start: 2023-10-11 | End: 2023-12-26 | Stop reason: SDUPTHER

## 2023-10-11 RX ORDER — ATORVASTATIN CALCIUM 10 MG/1
10 TABLET, FILM COATED ORAL NIGHTLY
Qty: 90 TABLET | Refills: 1 | Status: SHIPPED | OUTPATIENT
Start: 2023-10-11 | End: 2023-10-12

## 2023-10-11 RX ORDER — NITROGLYCERIN 0.4 MG/1
0.4 TABLET SUBLINGUAL EVERY 5 MIN PRN
Qty: 30 TABLET | Refills: 0 | Status: SHIPPED | OUTPATIENT
Start: 2023-10-11 | End: 2024-10-10

## 2023-10-11 RX ORDER — VALSARTAN AND HYDROCHLOROTHIAZIDE 320; 25 MG/1; MG/1
1 TABLET, FILM COATED ORAL DAILY
Qty: 90 TABLET | Refills: 1 | Status: SHIPPED | OUTPATIENT
Start: 2023-10-11 | End: 2024-10-10

## 2023-10-11 RX ORDER — CARVEDILOL 6.25 MG/1
6.25 TABLET ORAL 2 TIMES DAILY WITH MEALS
Qty: 60 TABLET | Refills: 3 | Status: SHIPPED | OUTPATIENT
Start: 2023-10-11 | End: 2023-10-11 | Stop reason: SDUPTHER

## 2023-10-11 NOTE — PROGRESS NOTES
Subjective:   Patient ID:  Kendrick Moreno is a 43 y.o. male who presents for cardiac consult of Chest Pain, Shortness of Breath, and Palpitations    Referring Physician:    Teri Rich MD [id number] [id number], [place] [address]      Reason for consult: BLAS    HPI  The patient came in today for cardiac consult of Chest Pain, Shortness of Breath, and Palpitations      Kendrick Moreno is a 43 y.o. male pt with HTN, HLD, obesity, ENRIQUE, anxiety, tobacco use presents for follow up CV eval.       3/9/23  Pt has been having CP, arm tightness checked BP it was 200s/100s and went to Dr. Rich    - Patient is here after long time,   He says he has been to the ER with extremely high blood pressure episode systolic up in the 220s, this was accompanied by chest pain.    Patient says over the last 6 months he has been short of breath with slight exertion and he gets chest pain.  He has been extremely tired fatigued no energy and can fall asleep easily.    Denies any orthopnea  He denies any weight gain   Occasional alcohol use and does weight but is giving that up also soon.  He was given lisinopril at home but he is not brought any home readings.  He has some tingling numbness of his fingers also.    He is under a lot of stress for about a month or 2 going through some legal battles    Bp here is 140/91. He has on going SOB . ECG NSR.       10/11/23  Follow up since March, pt had to reschedule and had insurance issues trying to get [facility] scheduled.     ECHO 3/2023 with normal bi V function and valves. Holter 3/2023 neg, AVG HR 87 bpm.     BP elevated today but improved from prior - 130s/90s. HR 80s. BMI 37 - 294 lbs.   He has left leg vibration feeling. He had COVID 19 a month ago.     Patient feels no leg swelling, no PND,  no syncope, no CNS symptoms.    Patient has fairly good exercise tolerance. He is a contractor, usually busy.     Patient is compliant with medications.    FH - grandfathers - had MI    Results for  orders placed during the hospital encounter of 03/22/23    Echo    Interpretation Summary  · Concentric remodeling and normal systolic function.  · The estimated ejection fraction is 60%.  · Normal left ventricular diastolic function.  · Normal right ventricular size with normal right ventricular systolic function.    Holter 3/2023  Conclusion       Predominant Rhythm Sinus rhythm with heart rates varying between 55 and 132 BPM with an average of 87BPM  The diary was not returned. There were rare hookup related artifacts. The tape was adequate (0 days , 48 hours, 0 minutes  Supraventricular Arrhythmias There were very rare PACs totalling 3 and averaging 0.06 per hour.  The circadian pattern of sinus rate variability followed a typical curve.        Results for orders placed during the hospital encounter of 01/24/23    Nuclear Stress - Cardiology Interpreted    Interpretation Summary    Normal myocardial perfusion scan. There is no evidence of myocardial ischemia or infarction.    There is a  mild intensity fixed perfusion abnormality in the inferolateral wall of the left ventricle secondary to diaphragm attenuation.    The gated perfusion images showed an ejection fraction of 68% at rest. The gated perfusion images showed an ejection fraction of 68% post stress.    The ECG portion of the study is negative for ischemia.    The patient reported no chest pain during the stress test.    There were no arrhythmias during stress.    Procedure Note    PHYSICIAN INTERPRETATION AND COMMENTS: Findings are consistent with moderate, positional obstructive sleep  apnea(ENRIQUE). Therapy with CPAP indicated.       Normal sinus rhythm   Normal ECG   When compared with ECG of 16-NOV-2016 14:37,   No significant change was found   Confirmed by MD GABINO, ABILIO ([id number]) on 1/12/2023 8:25:33 AM     Referred By: MD LANI GARCIA           Confirmed By:ABILIO LLOYD MD  History reviewed. No pertinent past medical history.    History reviewed. No  pertinent surgical history.    Social History     Tobacco Use    Smoking status: Light Smoker     Current packs/day: 0.25     Average packs/day: 0.3 packs/day for 1 year (0.3 ttl pk-yrs)     Types: Cigars, Cigarettes    Smokeless tobacco: Never    Tobacco comments:     cigars   Substance Use Topics    Alcohol use: Yes    Drug use: No       History reviewed. No pertinent family history.    Patient's Medications   New Prescriptions    CARVEDILOL (COREG) 6.25 MG TABLET    Take 1 tablet (6.25 mg total) by mouth 2 (two) times daily with meals.   Previous Medications    ASPIRIN (ECOTRIN) 81 MG EC TABLET    Take 1 tablet (81 mg total) by mouth once daily.    ATORVASTATIN (LIPITOR) 10 MG TABLET    Take 1 tablet (10 mg total) by mouth every evening.    CITALOPRAM (CELEXA) 20 MG TABLET    Take 1 tablet (20 mg total) by mouth once daily.    NITROGLYCERIN (NITROSTAT) 0.4 MG SL TABLET    Place 1 tablet (0.4 mg total) under the tongue every 5 (five) minutes as needed for Chest pain. After 3 dose need to call EMS    VALSARTAN-HYDROCHLOROTHIAZIDE (DIOVAN-HCT) 320-25 MG PER TABLET    Take 1 tablet by mouth once daily.   Modified Medications    No medications on file   Discontinued Medications    No medications on file       Review of Systems   Constitutional:  Positive for malaise/fatigue.   HENT: Negative.     Eyes: Negative.    Respiratory:  Positive for shortness of breath.    Cardiovascular:  Positive for chest pain, palpitations and claudication.   Gastrointestinal: Negative.    Genitourinary: Negative.    Musculoskeletal:  Positive for back pain and joint pain.   Skin: Negative.    Neurological: Negative.    Endo/Heme/Allergies: Negative.    Psychiatric/Behavioral: Negative.     All 12 systems otherwise negative.      Wt Readings from Last 3 Encounters:   10/11/23 133.7 kg (294 lb 12.1 oz)   05/15/23 (!) 140.6 kg (310 lb 1.2 oz)   03/22/23 (!) 141.1 kg (311 lb)     Temp Readings from Last 3 Encounters:   01/10/23 97.4 °F (36.3  "°C) (Tympanic)   11/16/16 98.1 °F (36.7 °C) (Oral)   11/09/16 97.2 °F (36.2 °C) (Tympanic)     BP Readings from Last 3 Encounters:   10/11/23 (!) 132/92   05/15/23 118/84   03/22/23 (!) 140/91     Pulse Readings from Last 3 Encounters:   10/11/23 86   05/15/23 90   03/09/23 81       BP (!) 132/92 (BP Location: Right arm, Patient Position: Sitting, BP Method: Large (Manual))   Pulse 86   Ht 6' 2" (1.88 m)   Wt 133.7 kg (294 lb 12.1 oz)   SpO2 99%   BMI 37.84 kg/m²     Objective:   Physical Exam  Vitals and nursing note reviewed.   Constitutional:       General: He is not in acute distress.     Appearance: He is well-developed. He is obese. He is not diaphoretic.   HENT:      Head: Normocephalic and atraumatic.      Nose: Nose normal.   Eyes:      General: No scleral icterus.     Conjunctiva/sclera: Conjunctivae normal.   Neck:      Thyroid: No thyromegaly.      Vascular: No JVD.   Cardiovascular:      Rate and Rhythm: Normal rate and regular rhythm.      Heart sounds: S1 normal and S2 normal. No murmur heard.     No friction rub. No gallop. No S3 or S4 sounds.   Pulmonary:      Effort: Pulmonary effort is normal. No respiratory distress.      Breath sounds: Normal breath sounds. No stridor. No wheezing or rales.   Chest:      Chest wall: No tenderness.   Abdominal:      General: Bowel sounds are normal. There is no distension.      Palpations: Abdomen is soft. There is no mass.      Tenderness: There is no abdominal tenderness. There is no rebound.   Genitourinary:     Comments: Deferred  Musculoskeletal:         General: No tenderness or deformity. Normal range of motion.      Cervical back: Normal range of motion and neck supple.   Lymphadenopathy:      Cervical: No cervical adenopathy.   Skin:     General: Skin is warm and dry.      Coloration: Skin is not pale.      Findings: No erythema or rash.   Neurological:      Mental Status: He is alert and oriented to person, place, and time.      Motor: No abnormal " muscle tone.      Coordination: Coordination normal.   Psychiatric:         Behavior: Behavior normal.         Thought Content: Thought content normal.         Judgment: Judgment normal.         Lab Results   Component Value Date     01/11/2023    K 4.3 01/11/2023     01/11/2023    CO2 19 (L) 01/11/2023    BUN 12 01/11/2023    CREATININE 0.8 01/11/2023    GLU 82 01/11/2023    HGBA1C 4.7 01/11/2023    AST 25 01/11/2023    ALT 27 01/11/2023    ALBUMIN 4.5 01/11/2023    PROT 7.7 01/11/2023    BILITOT 1.0 01/11/2023    WBC 10.78 01/11/2023    HGB 17.0 01/11/2023    HCT 50.5 01/11/2023    MCV 94 01/11/2023     01/11/2023    TSH 1.179 01/11/2023    CHOL 206 (H) 01/11/2023    HDL 29 (L) 01/11/2023    LDLCALC 146.4 01/11/2023    TRIG 153 (H) 01/11/2023    BNP <10 01/11/2023     Assessment:      1. Chest pain, unspecified type    2. Palpitations    3. BLAS (dyspnea on exertion)    4. Obstructive sleep apnea    5. Essential hypertension    6. Other chest pain    7. Other hyperlipidemia    8. ENRIQUE on CPAP    9. Other form of dyspnea    10. BMI 37.0-37.9, adult    11. Claudication    12. Encounter for screening for cardiovascular disorders          Plan:       CP/BLAS, palpitations  - ECHO 3/2023 with normal bi V function and valves. Holter 3/2023 neg, AVG HR 87 bpm.   - concern for angina - will need R/LHC - insurance denied in past  - PRN NTG, if severe/SOB rec ER eval and urgent R/LHC   - order stress ECHO   - order CA score     2. HTN - elevated   - titrate meds  - add Coreg 6.25 mg BID  - increase ARB/HCTZ    3. HLD  - started statin  - needs repeat labs    4. Obesity BMI 37 - 294 lbs.   - cont weight loss     5. Moderate ENRIQUE, h/o COVID 19   - had Paxlovid   - cont CPAP     6. Tobacco use  - needs cessation     7. Claudication/vibration sensation  - order LE u/s and PEPPER       Thank you for allowing me to participate in this patient's care. Please do not hesitate to contact me with any questions or concerns.  Consult note has been forwarded to the referral physician.

## 2023-10-12 RX ORDER — PRAVASTATIN SODIUM 40 MG/1
40 TABLET ORAL NIGHTLY
Qty: 90 TABLET | Refills: 1 | Status: SHIPPED | OUTPATIENT
Start: 2023-10-12 | End: 2023-10-24

## 2023-10-18 ENCOUNTER — LAB VISIT (OUTPATIENT)
Dept: LAB | Facility: HOSPITAL | Age: 43
End: 2023-10-18
Attending: INTERNAL MEDICINE
Payer: MEDICAID

## 2023-10-18 DIAGNOSIS — E78.49 OTHER HYPERLIPIDEMIA: ICD-10-CM

## 2023-10-18 DIAGNOSIS — Z13.6 ENCOUNTER FOR SCREENING FOR CARDIOVASCULAR DISORDERS: ICD-10-CM

## 2023-10-18 DIAGNOSIS — R07.9 CHEST PAIN, UNSPECIFIED TYPE: ICD-10-CM

## 2023-10-18 LAB
ANION GAP SERPL CALC-SCNC: 9 MMOL/L (ref 8–16)
BUN SERPL-MCNC: 11 MG/DL (ref 6–20)
CALCIUM SERPL-MCNC: 9.9 MG/DL (ref 8.7–10.5)
CHLORIDE SERPL-SCNC: 110 MMOL/L (ref 95–110)
CHOLEST SERPL-MCNC: 186 MG/DL (ref 120–199)
CHOLEST/HDLC SERPL: 5.6 {RATIO} (ref 2–5)
CO2 SERPL-SCNC: 21 MMOL/L (ref 23–29)
CREAT SERPL-MCNC: 0.8 MG/DL (ref 0.5–1.4)
EST. GFR  (NO RACE VARIABLE): >60 ML/MIN/1.73 M^2
GLUCOSE SERPL-MCNC: 88 MG/DL (ref 70–110)
HDLC SERPL-MCNC: 33 MG/DL (ref 40–75)
HDLC SERPL: 17.7 % (ref 20–50)
LDLC SERPL CALC-MCNC: 113.8 MG/DL (ref 63–159)
NONHDLC SERPL-MCNC: 153 MG/DL
POTASSIUM SERPL-SCNC: 4.4 MMOL/L (ref 3.5–5.1)
SODIUM SERPL-SCNC: 140 MMOL/L (ref 136–145)
TRIGL SERPL-MCNC: 196 MG/DL (ref 30–150)

## 2023-10-18 PROCEDURE — 36415 COLL VENOUS BLD VENIPUNCTURE: CPT | Mod: PO | Performed by: INTERNAL MEDICINE

## 2023-10-18 PROCEDURE — 80048 BASIC METABOLIC PNL TOTAL CA: CPT | Performed by: INTERNAL MEDICINE

## 2023-10-18 PROCEDURE — 80061 LIPID PANEL: CPT | Performed by: INTERNAL MEDICINE

## 2023-10-24 RX ORDER — PRAVASTATIN SODIUM 40 MG/1
40 TABLET ORAL NIGHTLY
Qty: 90 TABLET | Refills: 1 | OUTPATIENT
Start: 2023-10-24 | End: 2024-10-23

## 2023-10-24 RX ORDER — ATORVASTATIN CALCIUM 20 MG/1
20 TABLET, FILM COATED ORAL NIGHTLY
Qty: 90 TABLET | Refills: 1 | Status: SHIPPED | OUTPATIENT
Start: 2023-10-24 | End: 2024-10-23

## 2023-10-25 ENCOUNTER — PATIENT MESSAGE (OUTPATIENT)
Dept: CARDIOLOGY | Facility: HOSPITAL | Age: 43
End: 2023-10-25
Payer: MEDICAID

## 2023-10-31 ENCOUNTER — PATIENT MESSAGE (OUTPATIENT)
Dept: CARDIOLOGY | Facility: HOSPITAL | Age: 43
End: 2023-10-31
Payer: MEDICAID

## 2023-11-01 ENCOUNTER — TELEPHONE (OUTPATIENT)
Dept: CARDIOLOGY | Facility: HOSPITAL | Age: 43
End: 2023-11-01
Payer: MEDICAID

## 2023-11-27 ENCOUNTER — HOSPITAL ENCOUNTER (OUTPATIENT)
Dept: RADIOLOGY | Facility: HOSPITAL | Age: 43
Discharge: HOME | End: 2023-11-27
Attending: INTERNAL MEDICINE
Payer: MEDICAID

## 2023-11-27 DIAGNOSIS — Z13.6 ENCOUNTER FOR SCREENING FOR CARDIOVASCULAR DISORDERS: ICD-10-CM

## 2023-11-27 DIAGNOSIS — R07.9 CHEST PAIN, UNSPECIFIED TYPE: ICD-10-CM

## 2023-11-27 DIAGNOSIS — R07.89 OTHER CHEST PAIN: ICD-10-CM

## 2023-11-27 PROCEDURE — 75571 CT HRT W/O DYE W/CA TEST: CPT | Mod: TC

## 2023-11-27 PROCEDURE — 75571 CT HRT W/O DYE W/CA TEST: CPT | Mod: 26,,, | Performed by: RADIOLOGY

## 2023-11-27 PROCEDURE — 75571 CT CALCIUM SCORING CARDIAC: ICD-10-PCS | Mod: 26,,, | Performed by: RADIOLOGY

## 2023-12-05 ENCOUNTER — TELEPHONE (OUTPATIENT)
Dept: CARDIOLOGY | Facility: HOSPITAL | Age: 43
End: 2023-12-05
Payer: MEDICAID

## 2023-12-05 NOTE — TELEPHONE ENCOUNTER
----- Message from Asim Lrener sent at 12/5/2023  1:05 PM CST -----  Contact: Kendrick Marks is needing a call back in regards to rescheduling 2 of his test. Please give him a call back at [phone]

## 2023-12-13 ENCOUNTER — HOSPITAL ENCOUNTER (OUTPATIENT)
Dept: CARDIOLOGY | Facility: HOSPITAL | Age: 43
Discharge: HOME | End: 2023-12-13
Attending: INTERNAL MEDICINE
Payer: MEDICAID

## 2023-12-13 ENCOUNTER — DOCUMENTATION ONLY (OUTPATIENT)
Dept: CARDIOLOGY | Facility: HOSPITAL | Age: 43
End: 2023-12-13
Payer: MEDICAID

## 2023-12-13 VITALS
SYSTOLIC BLOOD PRESSURE: 102 MMHG | DIASTOLIC BLOOD PRESSURE: 81 MMHG | BODY MASS INDEX: 37.73 KG/M2 | SYSTOLIC BLOOD PRESSURE: 128 MMHG | BODY MASS INDEX: 37.73 KG/M2 | HEIGHT: 74 IN | DIASTOLIC BLOOD PRESSURE: 81 MMHG | DIASTOLIC BLOOD PRESSURE: 57 MMHG | HEIGHT: 74 IN | HEIGHT: 74 IN | WEIGHT: 294 LBS | SYSTOLIC BLOOD PRESSURE: 128 MMHG | WEIGHT: 294 LBS | WEIGHT: 294 LBS | BODY MASS INDEX: 37.73 KG/M2

## 2023-12-13 DIAGNOSIS — R07.89 OTHER CHEST PAIN: ICD-10-CM

## 2023-12-13 DIAGNOSIS — R06.09 OTHER FORM OF DYSPNEA: ICD-10-CM

## 2023-12-13 DIAGNOSIS — R06.09 DOE (DYSPNEA ON EXERTION): ICD-10-CM

## 2023-12-13 DIAGNOSIS — I73.9 CLAUDICATION: ICD-10-CM

## 2023-12-13 DIAGNOSIS — R07.9 CHEST PAIN, UNSPECIFIED TYPE: ICD-10-CM

## 2023-12-13 DIAGNOSIS — I10 ESSENTIAL HYPERTENSION: ICD-10-CM

## 2023-12-13 LAB
AORTIC ROOT ANNULUS: 3.63 CM
ASCENDING AORTA: 3.15 CM
AV INDEX (PROSTH): 0.94
AV MEAN GRADIENT: 4 MMHG
AV PEAK GRADIENT: 7 MMHG
AV VALVE AREA BY VELOCITY RATIO: 4.6 CM²
AV VALVE AREA: 4.34 CM²
AV VELOCITY RATIO: 0.99
BSA FOR ECHO PROCEDURE: 2.64 M2
CV ECHO LV RWT: 0.5 CM
CV STRESS BASE HR: 71 BPM
DIASTOLIC BLOOD PRESSURE: 57 MMHG
DOP CALC AO PEAK VEL: 1.28 M/S
DOP CALC AO VTI: 27.8 CM
DOP CALC LVOT AREA: 4.6 CM2
DOP CALC LVOT DIAMETER: 2.43 CM
DOP CALC LVOT PEAK VEL: 1.27 M/S
DOP CALC LVOT STROKE VOLUME: 120.52 CM3
DOP CALC RVOT PEAK VEL: 0.89 M/S
DOP CALC RVOT VTI: 20.1 CM
DOP CALCLVOT PEAK VEL VTI: 26 CM
E WAVE DECELERATION TIME: 167.58 MSEC
E/A RATIO: 0.99
E/E' RATIO: 6.52 M/S
ECHO LV POSTERIOR WALL: 1.09 CM (ref 0.6–1.1)
EJECTION FRACTION: 60 %
FRACTIONAL SHORTENING: 30 % (ref 28–44)
IMMEDIATE ARM BP: 145 MMHG
IMMEDIATE LEFT ABI: 1.21
IMMEDIATE LEFT TIBIAL: 175 MMHG
IMMEDIATE RIGHT ABI: 1.21
IMMEDIATE RIGHT TIBIAL: 175 MMHG
INTERVENTRICULAR SEPTUM: 1.35 CM (ref 0.6–1.1)
IVC DIAMETER: 1.78 CM
IVRT: 91.34 MSEC
LA MAJOR: 6.05 CM
LA MINOR: 5.01 CM
LEFT ABI: 1.31
LEFT ANT TIBIAL SYS PSV: 56 CM/S
LEFT ARM BP: 121 MMHG
LEFT ATRIUM SIZE: 3.95 CM
LEFT ATRIUM VOLUME INDEX MOD: 22.8 ML/M2
LEFT ATRIUM VOLUME MOD: 58.3 CM3
LEFT CFA PSV: 90 CM/S
LEFT DORSALIS PEDIS: 164 MMHG
LEFT INTERNAL DIMENSION IN SYSTOLE: 3.03 CM (ref 2.1–4)
LEFT PERONEAL SYS PSV: 39 CM/S
LEFT POPLITEAL PSV: 59 CM/S
LEFT POST TIBIAL SYS PSV: 52 CM/S
LEFT POSTERIOR TIBIAL: 168 MMHG
LEFT PROFUNDA SYS PSV: 55 CM/S
LEFT SUPER FEMORAL DIST SYS PSV: 56 CM/S
LEFT SUPER FEMORAL MID SYS PSV: 74 CM/S
LEFT SUPER FEMORAL OSTIAL SYS PSV: 88 CM/S
LEFT SUPER FEMORAL PROX SYS PSV: 89 CM/S
LEFT TBI: 1.85
LEFT TIB/PER TRUNK SYS PSV: 57 CM/S
LEFT TOE PRESSURE: 237 MMHG
LEFT VENTRICLE DIASTOLIC VOLUME INDEX: 32.73 ML/M2
LEFT VENTRICLE DIASTOLIC VOLUME: 83.79 ML
LEFT VENTRICLE MASS INDEX: 74 G/M2
LEFT VENTRICLE SYSTOLIC VOLUME INDEX: 14 ML/M2
LEFT VENTRICLE SYSTOLIC VOLUME: 35.88 ML
LEFT VENTRICULAR INTERNAL DIMENSION IN DIASTOLE: 4.32 CM (ref 3.5–6)
LEFT VENTRICULAR MASS: 190.54 G
LV LATERAL E/E' RATIO: 5 M/S
LV SEPTAL E/E' RATIO: 9.38 M/S
LVOT MG: 3.5 MMHG
LVOT MV: 0.88 CM/S
MV PEAK A VEL: 0.76 M/S
MV PEAK E VEL: 0.75 M/S
MV STENOSIS PRESSURE HALF TIME: 48.6 MS
MV VALVE AREA P 1/2 METHOD: 4.53 CM2
OHS CV CPX 1 MINUTE RECOVERY HEART RATE: 125 BPM
OHS CV CPX 85 PERCENT MAX PREDICTED HEART RATE MALE: 152
OHS CV CPX ESTIMATED METS: 10.1
OHS CV CPX MAX PREDICTED HEART RATE: 179
OHS CV CPX PATIENT IS FEMALE: 0
OHS CV CPX PATIENT IS MALE: 1
OHS CV CPX PEAK DIASTOLIC BLOOD PRESSURE: 84 MMHG
OHS CV CPX PEAK HEAR RATE: 150 BPM
OHS CV CPX PEAK RATE PRESSURE PRODUCT: ABNORMAL
OHS CV CPX PEAK SYSTOLIC BLOOD PRESSURE: 178 MMHG
OHS CV CPX PERCENT MAX PREDICTED HEART RATE ACHIEVED: 84
OHS CV CPX RATE PRESSURE PRODUCT PRESENTING: 7242
OHS CV LEFT LOWER EXTREMITY ABI (NO CALC): 1.31
OHS CV RIGHT ABI LOWER EXTREMITY (NO CALC): 1.41
PISA TR MAX VEL: 1.98 M/S
POST1 ARM BP: 132 MMHG
POST1 LEFT ABI: 1.29
POST1 LEFT TIBIAL: 170 MMHG
POST1 RIGHT ABI: 1.36
POST1 RIGHT TIBIAL: 180 MMHG
PULM VEIN S/D RATIO: 1.31
PV MEAN GRADIENT: 2 MMHG
PV MV: 0.68 M/S
PV PEAK D VEL: 0.49 M/S
PV PEAK GRADIENT: 4 MMHG
PV PEAK S VEL: 0.64 M/S
PV PEAK VELOCITY: 0.97 M/S
RA MAJOR: 4.54 CM
RA PRESSURE ESTIMATED: 3 MMHG
RA WIDTH: 3.87 CM
RIGHT ABI: 1.41
RIGHT ANT TIBIAL SYS PSV: 54 CM/S
RIGHT ARM BP: 128 MMHG
RIGHT CFA PSV: 121 CM/S
RIGHT DORSALIS PEDIS: 169 MMHG
RIGHT PERONEAL SYS PSV: 56 CM/S
RIGHT POPLITEAL PSV: 67 CM/S
RIGHT POST TIBIAL SYS PSV: 52 CM/S
RIGHT POSTERIOR TIBIAL: 181 MMHG
RIGHT PROFUNDA SYS PSV: 59 CM/S
RIGHT SUPER FEMORAL DIST SYS PSV: 73 CM/S
RIGHT SUPER FEMORAL MID SYS PSV: 120 CM/S
RIGHT SUPER FEMORAL OSTIAL SYS PSV: 95 CM/S
RIGHT SUPER FEMORAL PROX SYS PSV: 113 CM/S
RIGHT TBI: 1.47
RIGHT TIB/PER TRUNK SYS PSV: 60 CM/S
RIGHT TOE PRESSURE: 188 MMHG
RIGHT VENTRICULAR END-DIASTOLIC DIMENSION: 3.81 CM
RV TB RVSP: 5 MMHG
SINUS: 3.18 CM
STJ: 3.25 CM
STRESS ECHO POST EXERCISE DUR MIN: 7 MINUTES
STRESS ECHO POST EXERCISE DUR SEC: 23 SECONDS
SYSTOLIC BLOOD PRESSURE: 102 MMHG
TDI LATERAL: 0.15 M/S
TDI SEPTAL: 0.08 M/S
TDI: 0.12 M/S
TR MAX PG: 16 MMHG
TREADMILL GRADE: 10 %
TREADMILL SPEED: 2 MPH
TREADMILL TIME: 5 MIN
TRICUSPID ANNULAR PLANE SYSTOLIC EXCURSION: 1.95 CM
TV REST PULMONARY ARTERY PRESSURE: 19 MMHG
Z-SCORE OF LEFT VENTRICULAR DIMENSION IN END DIASTOLE: -13.35
Z-SCORE OF LEFT VENTRICULAR DIMENSION IN END SYSTOLE: -9.18

## 2023-12-13 PROCEDURE — 93351 STRESS ECHO (CUPID ONLY): ICD-10-PCS | Mod: 26,,, | Performed by: STUDENT IN AN ORGANIZED HEALTH CARE EDUCATION/TRAINING PROGRAM

## 2023-12-13 PROCEDURE — 93325 DOPPLER ECHO COLOR FLOW MAPG: CPT

## 2023-12-13 PROCEDURE — 93325 STRESS ECHO (CUPID ONLY): ICD-10-PCS | Mod: 26,,, | Performed by: STUDENT IN AN ORGANIZED HEALTH CARE EDUCATION/TRAINING PROGRAM

## 2023-12-13 PROCEDURE — 93320 DOPPLER ECHO COMPLETE: CPT

## 2023-12-13 PROCEDURE — 93325 DOPPLER ECHO COLOR FLOW MAPG: CPT | Mod: 26,,, | Performed by: STUDENT IN AN ORGANIZED HEALTH CARE EDUCATION/TRAINING PROGRAM

## 2023-12-13 PROCEDURE — 93320 STRESS ECHO (CUPID ONLY): ICD-10-PCS | Mod: 26,,, | Performed by: STUDENT IN AN ORGANIZED HEALTH CARE EDUCATION/TRAINING PROGRAM

## 2023-12-13 PROCEDURE — 93925 LOWER EXTREMITY STUDY: CPT | Mod: 26,,, | Performed by: INTERNAL MEDICINE

## 2023-12-13 PROCEDURE — 93924 LWR XTR VASC STDY BILAT: CPT | Mod: 26,,, | Performed by: INTERNAL MEDICINE

## 2023-12-13 PROCEDURE — 93924 LWR XTR VASC STDY BILAT: CPT

## 2023-12-13 PROCEDURE — 93924 ANKLE BRACHIAL INDICES (ABI): ICD-10-PCS | Mod: 26,,, | Performed by: INTERNAL MEDICINE

## 2023-12-13 PROCEDURE — 93925 LOWER EXTREMITY STUDY: CPT

## 2023-12-13 PROCEDURE — 93925 CV US DOPPLER ARTERIAL LEGS BILATERAL (CUPID ONLY): ICD-10-PCS | Mod: 26,,, | Performed by: INTERNAL MEDICINE

## 2023-12-13 PROCEDURE — 93320 DOPPLER ECHO COMPLETE: CPT | Mod: 26,,, | Performed by: STUDENT IN AN ORGANIZED HEALTH CARE EDUCATION/TRAINING PROGRAM

## 2023-12-13 PROCEDURE — 93351 STRESS TTE COMPLETE: CPT | Mod: 26,,, | Performed by: STUDENT IN AN ORGANIZED HEALTH CARE EDUCATION/TRAINING PROGRAM

## 2023-12-15 ENCOUNTER — TELEPHONE (OUTPATIENT)
Dept: FAMILY MEDICINE | Facility: CLINIC | Age: 43
End: 2023-12-15
Payer: MEDICAID

## 2023-12-15 ENCOUNTER — TELEPHONE (OUTPATIENT)
Dept: CARDIOLOGY | Facility: CLINIC | Age: 43
End: 2023-12-15
Payer: MEDICAID

## 2023-12-15 NOTE — TELEPHONE ENCOUNTER
----- Message from Kyra Toro sent at 12/15/2023  2:51 PM CST -----  Contact: Pt  Type:  Sooner Apoointment Request    Caller is requesting a sooner appointment.  Caller declined first available appointment listed below.  Caller will not accept being placed on the waitlist and is requesting a message be sent to doctor.  Name of Caller: pt  When is the first available appointment?  Symptoms: med refill/ follow up appt   Would the patient rather a call back or a response via [facility]?  phone  Best Call Back Number: [phone]  Additional Information:  asap ;/ pt states

## 2023-12-15 NOTE — TELEPHONE ENCOUNTER
Called and spoke to pt regarding being scheduled than his sooner appointment on 01/18/24. Pt rescheduled to 12/26/23 with Najma Juarez and he was also added to the wait list in case a sooner appointment becomes available.    ----- Message from Kyra Toro sent at 12/15/2023  2:46 PM CST -----  Contact: pt  Pt is having some issues with his heart and is wanting to come in earlier than the 01/18 appt and will see Ms Juarez also if Dr not avail and can be reached at [phone]//thanks/dbw

## 2023-12-18 ENCOUNTER — OFFICE VISIT (OUTPATIENT)
Dept: FAMILY MEDICINE | Facility: CLINIC | Age: 43
End: 2023-12-18
Payer: MEDICAID

## 2023-12-18 VITALS
SYSTOLIC BLOOD PRESSURE: 117 MMHG | BODY MASS INDEX: 38.3 KG/M2 | DIASTOLIC BLOOD PRESSURE: 86 MMHG | WEIGHT: 298.31 LBS | HEART RATE: 80 BPM | OXYGEN SATURATION: 98 %

## 2023-12-18 DIAGNOSIS — R41.3 MEMORY LOSS DUE TO MEDICAL CONDITION: Primary | ICD-10-CM

## 2023-12-18 DIAGNOSIS — R07.9 CHEST PAIN, UNSPECIFIED TYPE: ICD-10-CM

## 2023-12-18 DIAGNOSIS — I10 ESSENTIAL HYPERTENSION: ICD-10-CM

## 2023-12-18 PROCEDURE — 3079F DIAST BP 80-89 MM HG: CPT | Mod: CPTII,,, | Performed by: FAMILY MEDICINE

## 2023-12-18 PROCEDURE — 4010F PR ACE/ARB THEARPY RXD/TAKEN: ICD-10-PCS | Mod: CPTII,,, | Performed by: FAMILY MEDICINE

## 2023-12-18 PROCEDURE — 4010F ACE/ARB THERAPY RXD/TAKEN: CPT | Mod: CPTII,,, | Performed by: FAMILY MEDICINE

## 2023-12-18 PROCEDURE — 3044F PR MOST RECENT HEMOGLOBIN A1C LEVEL <7.0%: ICD-10-PCS | Mod: CPTII,,, | Performed by: FAMILY MEDICINE

## 2023-12-18 PROCEDURE — 3044F HG A1C LEVEL LT 7.0%: CPT | Mod: CPTII,,, | Performed by: FAMILY MEDICINE

## 2023-12-18 PROCEDURE — 3079F PR MOST RECENT DIASTOLIC BLOOD PRESSURE 80-89 MM HG: ICD-10-PCS | Mod: CPTII,,, | Performed by: FAMILY MEDICINE

## 2023-12-18 PROCEDURE — 99999 PR PBB SHADOW E&M-EST. PATIENT-LVL III: CPT | Mod: PBBFAC,,, | Performed by: FAMILY MEDICINE

## 2023-12-18 PROCEDURE — 99214 OFFICE O/P EST MOD 30 MIN: CPT | Mod: S$PBB,,, | Performed by: FAMILY MEDICINE

## 2023-12-18 PROCEDURE — 3008F BODY MASS INDEX DOCD: CPT | Mod: CPTII,,, | Performed by: FAMILY MEDICINE

## 2023-12-18 PROCEDURE — 1159F MED LIST DOCD IN RCRD: CPT | Mod: CPTII,,, | Performed by: FAMILY MEDICINE

## 2023-12-18 PROCEDURE — 3074F PR MOST RECENT SYSTOLIC BLOOD PRESSURE < 130 MM HG: ICD-10-PCS | Mod: CPTII,,, | Performed by: FAMILY MEDICINE

## 2023-12-18 PROCEDURE — 1159F PR MEDICATION LIST DOCUMENTED IN MEDICAL RECORD: ICD-10-PCS | Mod: CPTII,,, | Performed by: FAMILY MEDICINE

## 2023-12-18 PROCEDURE — 99999 PR PBB SHADOW E&M-EST. PATIENT-LVL III: ICD-10-PCS | Mod: PBBFAC,,, | Performed by: FAMILY MEDICINE

## 2023-12-18 PROCEDURE — 3074F SYST BP LT 130 MM HG: CPT | Mod: CPTII,,, | Performed by: FAMILY MEDICINE

## 2023-12-18 PROCEDURE — 99213 OFFICE O/P EST LOW 20 MIN: CPT | Mod: PBBFAC,PO | Performed by: FAMILY MEDICINE

## 2023-12-18 PROCEDURE — 99214 PR OFFICE/OUTPT VISIT, EST, LEVL IV, 30-39 MIN: ICD-10-PCS | Mod: S$PBB,,, | Performed by: FAMILY MEDICINE

## 2023-12-18 PROCEDURE — 3008F PR BODY MASS INDEX (BMI) DOCUMENTED: ICD-10-PCS | Mod: CPTII,,, | Performed by: FAMILY MEDICINE

## 2023-12-18 NOTE — PROGRESS NOTES
Chief Complaint:    Chief Complaint   Patient presents with    Follow-up     BP       History of Present Illness:    12/18/2023:-    Presents today the following complaints,   He has been following with Cardiology and Cardiology wants to do left heart catheterization but does insurance is refusing to pay for it.  He says his blood pressure spikes at times and it causes chest pain he takes nitroglycerin which relieves the pain he has been told by Cardiology to go to the ED should that happen     He is complaining of memory troubles he seems to think it is 1 of his medications cause him to not be able to remember.  He has not driving as a result.      He is still under lot of stress related to a legal trial that is coming up his lower wants him to testify but he does not think that he has in a position to testify because of the memory issues.      01/10/2023:-  Patient is here after long time,   He says he has been to the ER with extremely high blood pressure episode systolic up in the 220s, this was accompanied by chest pain.    Patient says over the last 6 months he has been short of breath with slight exertion and he gets chest pain.  He has been extremely tired fatigued no energy and can fall asleep easily.    Denies any orthopnea  He denies any weight gain   Occasional alcohol use and does weight but is giving that up also soon.    He was given lisinopril at home but he is not brought any home readings.  He has some tingling numbness of his fingers also.      He is under a lot of stress for about a month or 2 going through some legal battles          ROS:  Review of Systems   Constitutional:  Negative for appetite change, chills and fever.   HENT:  Negative for congestion, ear pain, postnasal drip, rhinorrhea, sinus pressure and sinus pain.    Eyes:  Negative for pain.   Respiratory:  Negative for chest tightness and shortness of breath.    Cardiovascular:  Positive for chest pain. Negative for palpitations.    Gastrointestinal:  Negative for abdominal pain, blood in stool, constipation, diarrhea and nausea.   Genitourinary:  Negative for difficulty urinating, dysuria, flank pain and hematuria.   Musculoskeletal:  Negative for arthralgias and myalgias.   Skin:  Negative for pallor and wound.   Neurological:  Negative for dizziness, tremors, speech difficulty, light-headedness and headaches.   Psychiatric/Behavioral:  Negative for behavioral problems, dysphoric mood and sleep disturbance. The patient is nervous/anxious.    All other systems reviewed and are negative.      History reviewed. No pertinent past medical history.    Social History:  Social History     Socioeconomic History    Marital status:    Tobacco Use    Smoking status: Light Smoker     Current packs/day: 0.25     Average packs/day: 0.3 packs/day for 1 year (0.3 ttl pk-yrs)     Types: Cigars, Cigarettes    Smokeless tobacco: Never    Tobacco comments:     cigars   Substance and Sexual Activity    Alcohol use: Yes    Drug use: No    Sexual activity: Yes     Partners: Female     Birth control/protection: None     Social Determinants of Health     Financial Resource Strain: Low Risk  (12/18/2023)    Overall Financial Resource Strain (CARDIA)     Difficulty of Paying Living Expenses: Not very hard   Food Insecurity: No Food Insecurity (12/18/2023)    Hunger Vital Sign     Worried About Running Out of Food in the Last Year: Never true     Ran Out of Food in the Last Year: Never true   Transportation Needs: No Transportation Needs (12/18/2023)    PRAPARE - Transportation     Lack of Transportation (Medical): No     Lack of Transportation (Non-Medical): No   Physical Activity: Insufficiently Active (12/18/2023)    Exercise Vital Sign     Days of Exercise per Week: 2 days     Minutes of Exercise per Session: 20 min   Stress: Stress Concern Present (12/18/2023)    [employer] - Occupational Stress Questionnaire     Feeling of  Stress : To some extent   Social Connections: Unknown (12/18/2023)    Social Connection and Isolation Panel [NHANES]     Frequency of Communication with Friends and Family: More than three times a week     Frequency of Social Gatherings with Friends and Family: Once a week     Active Member of Clubs or Organizations: No     Attends Club or Organization Meetings: Never     Marital Status:    Housing Stability: Low Risk  (12/18/2023)    Housing Stability Vital Sign     Unable to Pay for Housing in the Last Year: No     Number of Places Lived in the Last Year: 1     Unstable Housing in the Last Year: No       Family History:   family history is not on file.    Health Maintenance   Topic Date Due    Hepatitis C Screening  Never done    TETANUS VACCINE  Never done    High Dose Statin  10/24/2024    Lipid Panel  10/18/2028       Physical Exam:    Vital Signs  Pulse: 80  SpO2: 98 %  BP: 117/86  BP Location: Left arm  Patient Position: Sitting  Pain Score: 0-No pain  Height and Weight  Weight: 135.3 kg (298 lb 4.5 oz)]    Body mass index is 38.3 kg/m².    Physical Exam  Vitals and nursing note reviewed.   Constitutional:       Appearance: Normal appearance.   HENT:      Head: Normocephalic and atraumatic.      Right Ear: Tympanic membrane and ear canal normal.      Left Ear: Tympanic membrane and ear canal normal.   Eyes:      Extraocular Movements: Extraocular movements intact.      Pupils: Pupils are equal, round, and reactive to light.   Cardiovascular:      Rate and Rhythm: Normal rate and regular rhythm.      Pulses: Normal pulses.      Heart sounds: Normal heart sounds. No murmur heard.     No gallop.   Pulmonary:      Effort: Pulmonary effort is normal. No respiratory distress.      Breath sounds: Normal breath sounds. No wheezing, rhonchi or rales.   Abdominal:      General: There is no distension.      Palpations: Abdomen is soft.      Tenderness: There is no abdominal tenderness.   Musculoskeletal:          General: No swelling, deformity or signs of injury. Normal range of motion.      Cervical back: Normal range of motion.   Skin:     General: Skin is warm and dry.      Capillary Refill: Capillary refill takes less than 2 seconds.      Coloration: Skin is not jaundiced or pale.   Neurological:      General: No focal deficit present.      Mental Status: He is alert and oriented to person, place, and time.   Psychiatric:         Mood and Affect: Mood normal.         Behavior: Behavior normal.           Assessment:      ICD-10-CM ICD-9-CM   1. Memory loss due to medical condition  R41.3 780.93   2. Essential hypertension  I10 401.9   3. Chest pain, unspecified type  R07.9 786.50         Plan:       Recommend that he he stop statin 1st and then few days later wean off Celexa then let us know how his memory does.  If he is still seems to have memory troubles then we can try to take him off of the valsartan and replace it with a calcium channel blocker but he will have to monitor his blood pressure carefully   Continue nitroglycerin p.r.n. for chest pain and continue follow with Cardiology    Will provide a letter that patient is unable to testify on his behalf due to his ongoing medical problems.    He will touch base with me via messaging in the next 1 week and then 2 weeks and then 3 weeks    No orders of the defined types were placed in this encounter.      Current Outpatient Medications   Medication Sig Dispense Refill    aspirin (ECOTRIN) 81 MG EC tablet Take 1 tablet (81 mg total) by mouth once daily. 90 tablet 1    atorvastatin (LIPITOR) 20 MG tablet Take 1 tablet (20 mg total) by mouth every evening. Changing Pravastatin to Lipitor due to insurance 90 tablet 1    carvediloL (COREG) 6.25 MG tablet Take 1 tablet (6.25 mg total) by mouth 2 (two) times daily with meals. 180 tablet 1    citalopram (CELEXA) 20 MG tablet Take 1 tablet (20 mg total) by mouth once daily. 30 tablet 11    nitroGLYCERIN (NITROSTAT) 0.4 MG SL  tablet Place 1 tablet (0.4 mg total) under the tongue every 5 (five) minutes as needed for Chest pain. After 3 dose need to call EMS 30 tablet 0    valsartan-hydrochlorothiazide (DIOVAN-HCT) 320-25 mg per tablet Take 1 tablet by mouth once daily. 90 tablet 1     No current facility-administered medications for this visit.       There are no discontinued medications.    No follow-ups on file.      Teri Rich MD      Scribe Attestation:   I, Ken Peter, am scribing for, and in the presence of, Dr.Arif Rich I performed the above scribed service and the documentation accurately describes the services I performed. I attest to the accuracy of the note.    I, Dr. Teri Rich, reviewed documentation as scribed above. I performed the services described in this documentation.  I agree that the record reflects my personal performance and is accurate and complete. Teri Rich MD.  01/06/2023

## 2023-12-19 ENCOUNTER — PATIENT MESSAGE (OUTPATIENT)
Dept: FAMILY MEDICINE | Facility: CLINIC | Age: 43
End: 2023-12-19
Payer: MEDICAID

## 2023-12-26 ENCOUNTER — OFFICE VISIT (OUTPATIENT)
Dept: CARDIOLOGY | Facility: CLINIC | Age: 43
End: 2023-12-26
Payer: MEDICAID

## 2023-12-26 ENCOUNTER — PATIENT MESSAGE (OUTPATIENT)
Dept: FAMILY MEDICINE | Facility: CLINIC | Age: 43
End: 2023-12-26
Payer: MEDICAID

## 2023-12-26 VITALS
OXYGEN SATURATION: 100 % | BODY MASS INDEX: 39.07 KG/M2 | DIASTOLIC BLOOD PRESSURE: 92 MMHG | WEIGHT: 304.44 LBS | HEIGHT: 74 IN | HEART RATE: 82 BPM | SYSTOLIC BLOOD PRESSURE: 130 MMHG

## 2023-12-26 DIAGNOSIS — I73.9 CLAUDICATION: ICD-10-CM

## 2023-12-26 DIAGNOSIS — R06.09 DOE (DYSPNEA ON EXERTION): ICD-10-CM

## 2023-12-26 DIAGNOSIS — R41.3 MEMORY LOSS: ICD-10-CM

## 2023-12-26 DIAGNOSIS — G47.33 OSA ON CPAP: ICD-10-CM

## 2023-12-26 DIAGNOSIS — E66.01 CLASS 3 SEVERE OBESITY WITH SERIOUS COMORBIDITY AND BODY MASS INDEX (BMI) OF 40.0 TO 44.9 IN ADULT, UNSPECIFIED OBESITY TYPE: ICD-10-CM

## 2023-12-26 DIAGNOSIS — I10 ESSENTIAL HYPERTENSION: Primary | ICD-10-CM

## 2023-12-26 PROCEDURE — 1160F PR REVIEW ALL MEDS BY PRESCRIBER/CLIN PHARMACIST DOCUMENTED: ICD-10-PCS | Mod: CPTII,,, | Performed by: INTERNAL MEDICINE

## 2023-12-26 PROCEDURE — 3080F DIAST BP >= 90 MM HG: CPT | Mod: CPTII,,, | Performed by: INTERNAL MEDICINE

## 2023-12-26 PROCEDURE — 4010F ACE/ARB THERAPY RXD/TAKEN: CPT | Mod: CPTII,,, | Performed by: INTERNAL MEDICINE

## 2023-12-26 PROCEDURE — 3075F SYST BP GE 130 - 139MM HG: CPT | Mod: CPTII,,, | Performed by: INTERNAL MEDICINE

## 2023-12-26 PROCEDURE — 99214 PR OFFICE/OUTPT VISIT, EST, LEVL IV, 30-39 MIN: ICD-10-PCS | Mod: S$PBB,,, | Performed by: INTERNAL MEDICINE

## 2023-12-26 PROCEDURE — 3008F PR BODY MASS INDEX (BMI) DOCUMENTED: ICD-10-PCS | Mod: CPTII,,, | Performed by: INTERNAL MEDICINE

## 2023-12-26 PROCEDURE — 3008F BODY MASS INDEX DOCD: CPT | Mod: CPTII,,, | Performed by: INTERNAL MEDICINE

## 2023-12-26 PROCEDURE — 3075F PR MOST RECENT SYSTOLIC BLOOD PRESS GE 130-139MM HG: ICD-10-PCS | Mod: CPTII,,, | Performed by: INTERNAL MEDICINE

## 2023-12-26 PROCEDURE — 99999 PR PBB SHADOW E&M-EST. PATIENT-LVL IV: CPT | Mod: PBBFAC,,, | Performed by: INTERNAL MEDICINE

## 2023-12-26 PROCEDURE — 99214 OFFICE O/P EST MOD 30 MIN: CPT | Mod: S$PBB,,, | Performed by: INTERNAL MEDICINE

## 2023-12-26 PROCEDURE — 99999 PR PBB SHADOW E&M-EST. PATIENT-LVL IV: ICD-10-PCS | Mod: PBBFAC,,, | Performed by: INTERNAL MEDICINE

## 2023-12-26 PROCEDURE — 1160F RVW MEDS BY RX/DR IN RCRD: CPT | Mod: CPTII,,, | Performed by: INTERNAL MEDICINE

## 2023-12-26 PROCEDURE — 1159F PR MEDICATION LIST DOCUMENTED IN MEDICAL RECORD: ICD-10-PCS | Mod: CPTII,,, | Performed by: INTERNAL MEDICINE

## 2023-12-26 PROCEDURE — 1159F MED LIST DOCD IN RCRD: CPT | Mod: CPTII,,, | Performed by: INTERNAL MEDICINE

## 2023-12-26 PROCEDURE — 4010F PR ACE/ARB THEARPY RXD/TAKEN: ICD-10-PCS | Mod: CPTII,,, | Performed by: INTERNAL MEDICINE

## 2023-12-26 PROCEDURE — 3080F PR MOST RECENT DIASTOLIC BLOOD PRESSURE >= 90 MM HG: ICD-10-PCS | Mod: CPTII,,, | Performed by: INTERNAL MEDICINE

## 2023-12-26 PROCEDURE — 99214 OFFICE O/P EST MOD 30 MIN: CPT | Mod: PBBFAC | Performed by: INTERNAL MEDICINE

## 2023-12-26 PROCEDURE — 3044F HG A1C LEVEL LT 7.0%: CPT | Mod: CPTII,,, | Performed by: INTERNAL MEDICINE

## 2023-12-26 PROCEDURE — 3044F PR MOST RECENT HEMOGLOBIN A1C LEVEL <7.0%: ICD-10-PCS | Mod: CPTII,,, | Performed by: INTERNAL MEDICINE

## 2023-12-26 RX ORDER — CARVEDILOL 25 MG/1
25 TABLET ORAL 2 TIMES DAILY WITH MEALS
Qty: 180 TABLET | Refills: 1 | Status: SHIPPED | OUTPATIENT
Start: 2023-12-26 | End: 2024-12-25

## 2023-12-26 NOTE — PROGRESS NOTES
Subjective:   Patient ID:  Kendrick Moreno is a 43 y.o. male who presents for cardiac consult of Arm Pain (Left arm pain. Pain rated at an 8 when present.), Chest Pain (Pain rated at an 8 when present.), Shortness of Breath, Palpitations, and Hypertension (Pt states yesterday his Bp read 183/106. At that time pt took his Nitroglycerin medication. )    Referring Physician:    Teri Rich MD [[id number]] [id number] [id number]      Reason for consult: BLAS    HPI  The patient came in today for cardiac consult of Arm Pain (Left arm pain. Pain rated at an 8 when present.), Chest Pain (Pain rated at an 8 when present.), Shortness of Breath, Palpitations, and Hypertension (Pt states yesterday his Bp read 183/106. At that time pt took his Nitroglycerin medication. )      Kendrick Moreno is a 43 y.o. male pt with HTN, HLD, obesity, ENRIQUE, anxiety, tobacco use presents for follow up CV eval.       3/9/23  Pt has been having CP, arm tightness checked BP it was 200s/100s and went to Dr. Rich    - Patient is here after long time,   He says he has been to the ER with extremely high blood pressure episode systolic up in the 220s, this was accompanied by chest pain.    Patient says over the last 6 months he has been short of breath with slight exertion and he gets chest pain.  He has been extremely tired fatigued no energy and can fall asleep easily.    Denies any orthopnea  He denies any weight gain   Occasional alcohol use and does weight but is giving that up also soon.  He was given lisinopril at home but he is not brought any home readings.  He has some tingling numbness of his fingers also.    He is under a lot of stress for about a month or 2 going through some legal battles    Bp here is 140/91. He has on going SOB . ECG NSR.       10/11/23  Follow up since March, pt had to reschedule and had insurance issues trying to get [facility] scheduled.   ECHO 3/2023 with normal bi V function and valves. Holter 3/2023 neg,  AVG HR 87 bpm.   BP elevated today but improved from prior - 130s/90s. HR 80s. BMI 37 - 294 lbs.   He has left leg vibration feeling. He had COVID 19 a month ago.     12/26/23  Stress ECHO 12/2023 with normal bi V function and valves, neg for ischemia. 10.1 METS. PEPPER, LE arterial u/s neg 12/2023. Holter neg 12/2023.   Calcium score is zero.     BP elevated 130s/90s. HR 80s. BMI 39 - 304 lbs.   His BP went up to 189/106 at rest. Bp occ increasing to 200s.       Patient has fairly good exercise tolerance. He is a contractor, usually busy.     Patient is compliant with medications.    FH - grandfathers - had MI    Summary Stress ECHO 12/2023         Left Ventricle: The left ventricle is normal in size. Normal wall thickness. There is concentric remodeling. Normal wall motion. There is normal systolic function with a visually estimated ejection fraction of 55 - 70%. Ejection fraction by visual approximation is 60%. There is normal diastolic function.    Right Ventricle: Normal right ventricular cavity size. Wall thickness is normal. Right ventricle wall motion  is normal. Systolic function is normal.    Pulmonary Artery: The estimated pulmonary artery systolic pressure is 19 mmHg.    IVC/SVC: Normal venous pressure at 3 mmHg.    Stress Protocol: The patient exercised for 7 minutes 23 seconds on a Zheng protocol, corresponding to a functional capacity of 10.1 METS, achieving a peak heart rate of 150 bpm, which is 84 % of the age predicted maximum heart rate. Their exercise capacity was normal. The patient reported no symptoms during the stress test. The test was stopped because the patient requested it and they experienced shortness of breaththe technologist noted ECG changes.    Baseline ECG: The Baseline ECG reveals sinus rhythm. The baseline ECG has early repolarization.    Stress ECG: There are no ST segment deviation identified during the protocol. There are no arrhythmias during stress. There is normal blood  pressure response with stress.    ECG Conclusion: The ECG portion of the study is negative for ischemia. Sensitivity is reduced due to failure to reach target heart rate.    Post-stress Impression: The study is negative with no echocardiographic evidence of stress induced ischemia.      Conclusion LE arterial          Bilateral normal study.      Conclusion Holter 12/2023       Predominant Rhythm Sinus rhythm with heart rates varying between 55 and 132 BPM with an average of 87BPM  The diary was not returned. There were rare hookup related artifacts. The tape was adequate (0 days , 48 hours, 0 minutes  Supraventricular Arrhythmias There were very rare PACs totalling 3 and averaging 0.06 per hour.  The circadian pattern of sinus rate variability followed a typical curve.    Left main coronary artery: 0  Left anterior descending artery: 0  Circumflex artery: 0  Right coronary artery: 0  Posterior descending artery: 0     Total calcium score: 0    Results for orders placed during the hospital encounter of 03/22/23    Echo    Interpretation Summary  · Concentric remodeling and normal systolic function.  · The estimated ejection fraction is 60%.  · Normal left ventricular diastolic function.  · Normal right ventricular size with normal right ventricular systolic function.    Holter 3/2023  Conclusion       Predominant Rhythm Sinus rhythm with heart rates varying between 55 and 132 BPM with an average of 87BPM  The diary was not returned. There were rare hookup related artifacts. The tape was adequate (0 days , 48 hours, 0 minutes  Supraventricular Arrhythmias There were very rare PACs totalling 3 and averaging 0.06 per hour.  The circadian pattern of sinus rate variability followed a typical curve.        Results for orders placed during the hospital encounter of 01/24/23    Nuclear Stress - Cardiology Interpreted    Interpretation Summary    Normal myocardial perfusion scan. There is no evidence of myocardial ischemia  or infarction.    There is a  mild intensity fixed perfusion abnormality in the inferolateral wall of the left ventricle secondary to diaphragm attenuation.    The gated perfusion images showed an ejection fraction of 68% at rest. The gated perfusion images showed an ejection fraction of 68% post stress.    The ECG portion of the study is negative for ischemia.    The patient reported no chest pain during the stress test.    There were no arrhythmias during stress.    Procedure Note    PHYSICIAN INTERPRETATION AND COMMENTS: Findings are consistent with moderate, positional obstructive sleep  apnea(ENRIQUE). Therapy with CPAP indicated.       Normal sinus rhythm   Normal ECG   When compared with ECG of 16-NOV-2016 14:37,   No significant change was found   Confirmed by MD GABINO, ABILIO ([id number]) on 1/12/2023 8:25:33 AM     Referred By: MD LANI GARCIA           Confirmed By:ABILIO LLOYD MD  History reviewed. No pertinent past medical history.    History reviewed. No pertinent surgical history.    Social History     Tobacco Use    Smoking status: Light Smoker     Current packs/day: 0.25     Average packs/day: 0.3 packs/day for 1 year (0.3 ttl pk-yrs)     Types: Cigars, Cigarettes    Smokeless tobacco: Never    Tobacco comments:     cigars   Substance Use Topics    Alcohol use: Yes    Drug use: No       History reviewed. No pertinent family history.    Patient's Medications   New Prescriptions    No medications on file   Previous Medications    ASPIRIN (ECOTRIN) 81 MG EC TABLET    Take 1 tablet (81 mg total) by mouth once daily.    ATORVASTATIN (LIPITOR) 20 MG TABLET    Take 1 tablet (20 mg total) by mouth every evening. Changing Pravastatin to Lipitor due to insurance    CITALOPRAM (CELEXA) 20 MG TABLET    Take 1 tablet (20 mg total) by mouth once daily.    NITROGLYCERIN (NITROSTAT) 0.4 MG SL TABLET    Place 1 tablet (0.4 mg total) under the tongue every 5 (five) minutes as needed for Chest pain. After 3 dose need to call EMS     "VALSARTAN-HYDROCHLOROTHIAZIDE (DIOVAN-HCT) 320-25 MG PER TABLET    Take 1 tablet by mouth once daily.   Modified Medications    Modified Medication Previous Medication    CARVEDILOL (COREG) 25 MG TABLET carvediloL (COREG) 6.25 MG tablet       Take 1 tablet (25 mg total) by mouth 2 (two) times daily with meals.    Take 1 tablet (6.25 mg total) by mouth 2 (two) times daily with meals.   Discontinued Medications    No medications on file       Review of Systems   Constitutional:  Positive for malaise/fatigue.   HENT: Negative.     Eyes: Negative.    Respiratory:  Positive for shortness of breath.    Cardiovascular:  Positive for chest pain, palpitations and claudication.   Gastrointestinal: Negative.    Genitourinary: Negative.    Musculoskeletal:  Positive for back pain and joint pain.   Skin: Negative.    Neurological: Negative.    Endo/Heme/Allergies: Negative.    Psychiatric/Behavioral: Negative.     All 12 systems otherwise negative.      Wt Readings from Last 3 Encounters:   12/26/23 (!) 138.1 kg (304 lb 7.3 oz)   12/18/23 135.3 kg (298 lb 4.5 oz)   12/13/23 133.4 kg (294 lb)     Temp Readings from Last 3 Encounters:   01/10/23 97.4 °F (36.3 °C) (Tympanic)   11/16/16 98.1 °F (36.7 °C) (Oral)   11/09/16 97.2 °F (36.2 °C) (Tympanic)     BP Readings from Last 3 Encounters:   12/26/23 (!) 130/92   12/18/23 117/86   12/13/23 128/81     Pulse Readings from Last 3 Encounters:   12/26/23 82   12/18/23 80   10/11/23 86       BP (!) 130/92 (BP Location: Left arm, Patient Position: Sitting, BP Method: Large (Manual))   Pulse 82   Ht 6' 2" (1.88 m)   Wt (!) 138.1 kg (304 lb 7.3 oz)   SpO2 100%   BMI 39.09 kg/m²     Objective:   Physical Exam  Vitals and nursing note reviewed.   Constitutional:       General: He is not in acute distress.     Appearance: He is well-developed. He is obese. He is not diaphoretic.   HENT:      Head: Normocephalic and atraumatic.      Nose: Nose normal.   Eyes:      General: No scleral " icterus.     Conjunctiva/sclera: Conjunctivae normal.   Neck:      Thyroid: No thyromegaly.      Vascular: No JVD.   Cardiovascular:      Rate and Rhythm: Normal rate and regular rhythm.      Heart sounds: S1 normal and S2 normal. No murmur heard.     No friction rub. No gallop. No S3 or S4 sounds.   Pulmonary:      Effort: Pulmonary effort is normal. No respiratory distress.      Breath sounds: Normal breath sounds. No stridor. No wheezing or rales.   Chest:      Chest wall: No tenderness.   Abdominal:      General: Bowel sounds are normal. There is no distension.      Palpations: Abdomen is soft. There is no mass.      Tenderness: There is no abdominal tenderness. There is no rebound.   Genitourinary:     Comments: Deferred  Musculoskeletal:         General: No tenderness or deformity. Normal range of motion.      Cervical back: Normal range of motion and neck supple.   Lymphadenopathy:      Cervical: No cervical adenopathy.   Skin:     General: Skin is warm and dry.      Coloration: Skin is not pale.      Findings: No erythema or rash.   Neurological:      Mental Status: He is alert and oriented to person, place, and time.      Motor: No abnormal muscle tone.      Coordination: Coordination normal.   Psychiatric:         Behavior: Behavior normal.         Thought Content: Thought content normal.         Judgment: Judgment normal.         Lab Results   Component Value Date     10/18/2023    K 4.4 10/18/2023     10/18/2023    CO2 21 (L) 10/18/2023    BUN 11 10/18/2023    CREATININE 0.8 10/18/2023    GLU 88 10/18/2023    HGBA1C 4.7 01/11/2023    AST 25 01/11/2023    ALT 27 01/11/2023    ALBUMIN 4.5 01/11/2023    PROT 7.7 01/11/2023    BILITOT 1.0 01/11/2023    WBC 10.78 01/11/2023    HGB 17.0 01/11/2023    HCT 50.5 01/11/2023    MCV 94 01/11/2023     01/11/2023    TSH 1.179 01/11/2023    CHOL 186 10/18/2023    HDL 33 (L) 10/18/2023    LDLCALC 113.8 10/18/2023    TRIG 196 (H) 10/18/2023    BNP <10  01/11/2023     Assessment:      1. Essential hypertension    2. Class 3 severe obesity with serious comorbidity and body mass index (BMI) of 40.0 to 44.9 in adult, unspecified obesity type    3. ENRIQUE on CPAP    4. BMI 37.0-37.9, adult    5. Claudication    6. BLAS (dyspnea on exertion)            Plan:       CP/BLAS, palpitations  - ECHO 3/2023 with normal bi V function and valves. Holter 3/2023 neg, AVG HR 87 bpm.   - had concern for angina - will need R/LHC - insurance denied in past  - PRN NTG, if severe/SOB rec ER eval and urgent R/LHC   - Stress ECHO 12/2023 with normal bi V function and valves, neg for ischemia. 10.1 METS.   - Holter neg 12/2023.   - CA score - negative     2. HTN - elevated   - titrate meds  - add Coreg 6.25 mg BID - increase to 25 mg BID  - increase ARB/HCTZ    3. HLD  - stopped statin  - needs repeat labs    4. Obesity BMI 37 - 294 lbs --> BMI 39 - 304 lbs.   - cont weight loss     5. Moderate ENRIQUE, h/o COVID 19   - had Paxlovid   - cont CPAP     6. Tobacco use  - has quit    7. Claudication/vibration sensation  - PEPPER, LE arterial u/s neg 12/2023. Holter neg 12/2023    8. Memory loss  - refer to neuro   - has h/o early dementia     Has ongoing BP issues - do not advise extreme physical activity until BP resolves.     Thank you for allowing me to participate in this patient's care. Please do not hesitate to contact me with any questions or concerns. Consult note has been forwarded to the referral physician.

## 2024-01-11 ENCOUNTER — PATIENT MESSAGE (OUTPATIENT)
Dept: PULMONOLOGY | Facility: CLINIC | Age: 44
End: 2024-01-11
Payer: MEDICAID

## 2024-01-17 ENCOUNTER — PATIENT MESSAGE (OUTPATIENT)
Dept: PULMONOLOGY | Facility: CLINIC | Age: 44
End: 2024-01-17
Payer: MEDICAID

## 2024-01-17 ENCOUNTER — PATIENT MESSAGE (OUTPATIENT)
Dept: FAMILY MEDICINE | Facility: CLINIC | Age: 44
End: 2024-01-17
Payer: MEDICAID

## 2024-01-17 ENCOUNTER — TELEPHONE (OUTPATIENT)
Dept: CARDIOLOGY | Facility: CLINIC | Age: 44
End: 2024-01-17
Payer: MEDICAID

## 2024-01-17 DIAGNOSIS — R41.3 MEMORY LOSS DUE TO MEDICAL CONDITION: Primary | ICD-10-CM

## 2024-01-17 NOTE — TELEPHONE ENCOUNTER
Called and spoke to pt in regards to rescheduling his appointment that was cancelled for tomorrow. PT rescheduled to 02/13/24.    ----- Message from Paris Kapadia sent at 1/17/2024  2:27 PM CST -----  Type:  Needs Medical Advice    Who Called: pt     Would the patient rather a call back or a response via [facility]? Call back   Best Call Back Number:  [phone]  Additional Information: pt cancelled the appointment that was scheduled for 01/18/24 and the pt is requesting to have it rescheduled for sometime in Feb. For a 3 month f/u

## 2024-01-17 NOTE — TELEPHONE ENCOUNTER
No care due was identified.  [facility] Embedded Care Due Messages. Reference number: [id number].   1/17/2024 12:44:57 PM CST

## 2024-01-17 NOTE — TELEPHONE ENCOUNTER
Oxytocin Safety Progress Check Note - Cristal Estrada 23 y.o. female MRN: [id number]    Unit/Bed#: -01 Encounter: [id number]    Dose (laura-units/min) Oxytocin: 8 laura-units/min  Contraction Frequency (minutes): 1-2  Contraction Intensity: Strong  Uterine Activity Characteristics: Irregular  Cervical Dilation: 10  Dilation Complete Date: 01/16/24  Dilation Complete Time: 1924  Cervical Effacement: 100  Fetal Station: 0  Baseline Rate (FHR): 125 bpm  Fetal Heart Rate (FHT): 121 BPM  FHR Category: I           Vital Signs:   Vitals:    01/16/24 1842   BP: 121/67   Pulse: 75   Resp:    Temp:    SpO2:        Notes/comments:   - Fully dilated, 0 station. Will start pushing.       Patrick Merida MD 1/16/2024 7:32 PM       Returned call and rescheduled all appt to 12/13/2023 at [place], start time of 10.

## 2024-01-18 RX ORDER — CITALOPRAM 20 MG/1
20 TABLET, FILM COATED ORAL DAILY
Qty: 90 TABLET | Refills: 3 | Status: SHIPPED | OUTPATIENT
Start: 2024-01-18

## 2024-01-18 NOTE — TELEPHONE ENCOUNTER
Refill Decision Note   Kendrick Moreno  is requesting a refill authorization.  Brief Assessment and Rationale for Refill:  Approve     Medication Therapy Plan:         Comments:     Note composed:11:24 AM 01/18/2024

## 2024-01-29 ENCOUNTER — PATIENT MESSAGE (OUTPATIENT)
Dept: FAMILY MEDICINE | Facility: CLINIC | Age: 44
End: 2024-01-29
Payer: MEDICAID

## 2024-02-28 ENCOUNTER — PATIENT MESSAGE (OUTPATIENT)
Dept: CARDIOLOGY | Facility: CLINIC | Age: 44
End: 2024-02-28
Payer: MEDICAID

## 2024-02-28 ENCOUNTER — PATIENT MESSAGE (OUTPATIENT)
Dept: FAMILY MEDICINE | Facility: CLINIC | Age: 44
End: 2024-02-28
Payer: MEDICAID

## 2024-02-29 ENCOUNTER — PATIENT MESSAGE (OUTPATIENT)
Dept: PULMONOLOGY | Facility: CLINIC | Age: 44
End: 2024-02-29
Payer: MEDICAID

## 2024-03-27 ENCOUNTER — HOSPITAL ENCOUNTER (EMERGENCY)
Facility: HOSPITAL | Age: 44
Discharge: HOME | End: 2024-03-27
Attending: EMERGENCY MEDICINE
Payer: MEDICAID

## 2024-03-27 VITALS
TEMPERATURE: 99 F | DIASTOLIC BLOOD PRESSURE: 98 MMHG | RESPIRATION RATE: 18 BRPM | WEIGHT: 295.44 LBS | HEIGHT: 74 IN | BODY MASS INDEX: 37.92 KG/M2 | HEART RATE: 66 BPM | OXYGEN SATURATION: 99 % | SYSTOLIC BLOOD PRESSURE: 160 MMHG

## 2024-03-27 DIAGNOSIS — N20.1 CALCULUS OF DISTAL RIGHT URETER: Primary | ICD-10-CM

## 2024-03-27 DIAGNOSIS — R10.9 ACUTE RIGHT FLANK PAIN: ICD-10-CM

## 2024-03-27 DIAGNOSIS — N13.30 HYDRONEPHROSIS, UNSPECIFIED HYDRONEPHROSIS TYPE: ICD-10-CM

## 2024-03-27 LAB
ALBUMIN SERPL BCP-MCNC: 4.6 G/DL (ref 3.5–5.2)
ALP SERPL-CCNC: 69 U/L (ref 55–135)
ALT SERPL W/O P-5'-P-CCNC: 23 U/L (ref 10–44)
ANION GAP SERPL CALC-SCNC: 10 MMOL/L (ref 8–16)
AST SERPL-CCNC: 20 U/L (ref 10–40)
BACTERIA #/AREA URNS HPF: ABNORMAL /HPF
BASOPHILS # BLD AUTO: 0.08 K/UL (ref 0–0.2)
BASOPHILS NFR BLD: 0.5 % (ref 0–1.9)
BILIRUB SERPL-MCNC: 0.6 MG/DL (ref 0.1–1)
BILIRUB UR QL STRIP: NEGATIVE
BUN SERPL-MCNC: 14 MG/DL (ref 6–20)
CALCIUM SERPL-MCNC: 10 MG/DL (ref 8.7–10.5)
CHLORIDE SERPL-SCNC: 104 MMOL/L (ref 95–110)
CLARITY UR: ABNORMAL
CO2 SERPL-SCNC: 25 MMOL/L (ref 23–29)
COLOR UR: ABNORMAL
CREAT SERPL-MCNC: 1 MG/DL (ref 0.5–1.4)
DIFFERENTIAL METHOD BLD: ABNORMAL
EOSINOPHIL # BLD AUTO: 0 K/UL (ref 0–0.5)
EOSINOPHIL NFR BLD: 0.1 % (ref 0–8)
ERYTHROCYTE [DISTWIDTH] IN BLOOD BY AUTOMATED COUNT: 12.4 % (ref 11.5–14.5)
EST. GFR  (NO RACE VARIABLE): >60 ML/MIN/1.73 M^2
GLUCOSE SERPL-MCNC: 89 MG/DL (ref 70–110)
GLUCOSE UR QL STRIP: ABNORMAL
HCT VFR BLD AUTO: 52.5 % (ref 40–54)
HGB BLD-MCNC: 18.3 G/DL (ref 14–18)
HGB UR QL STRIP: ABNORMAL
HYALINE CASTS #/AREA URNS LPF: 75 /LPF
IMM GRANULOCYTES # BLD AUTO: 0.09 K/UL (ref 0–0.04)
IMM GRANULOCYTES NFR BLD AUTO: 0.5 % (ref 0–0.5)
KETONES UR QL STRIP: ABNORMAL
LEUKOCYTE ESTERASE UR QL STRIP: ABNORMAL
LIPASE SERPL-CCNC: 33 U/L (ref 4–60)
LYMPHOCYTES # BLD AUTO: 0.9 K/UL (ref 1–4.8)
LYMPHOCYTES NFR BLD: 5.4 % (ref 18–48)
MCH RBC QN AUTO: 33.2 PG (ref 27–31)
MCHC RBC AUTO-ENTMCNC: 34.9 G/DL (ref 32–36)
MCV RBC AUTO: 95 FL (ref 82–98)
MICROSCOPIC COMMENT: ABNORMAL
MONOCYTES # BLD AUTO: 0.8 K/UL (ref 0.3–1)
MONOCYTES NFR BLD: 4.8 % (ref 4–15)
NEUTROPHILS # BLD AUTO: 14.5 K/UL (ref 1.8–7.7)
NEUTROPHILS NFR BLD: 88.7 % (ref 38–73)
NITRITE UR QL STRIP: NEGATIVE
NRBC BLD-RTO: 0 /100 WBC
PH UR STRIP: 6 [PH] (ref 5–8)
PLATELET # BLD AUTO: 166 K/UL (ref 150–450)
PMV BLD AUTO: 10.8 FL (ref 9.2–12.9)
POTASSIUM SERPL-SCNC: 4.2 MMOL/L (ref 3.5–5.1)
PROT SERPL-MCNC: 7.6 G/DL (ref 6–8.4)
PROT UR QL STRIP: ABNORMAL
RBC # BLD AUTO: 5.52 M/UL (ref 4.6–6.2)
RBC #/AREA URNS HPF: 0 /HPF (ref 0–4)
SODIUM SERPL-SCNC: 139 MMOL/L (ref 136–145)
SP GR UR STRIP: >1.03 (ref 1–1.03)
UNIDENT CRYS URNS QL MICRO: ABNORMAL
URN SPEC COLLECT METH UR: ABNORMAL
UROBILINOGEN UR STRIP-ACNC: ABNORMAL EU/DL
WBC # BLD AUTO: 16.37 K/UL (ref 3.9–12.7)
WBC #/AREA URNS HPF: 0 /HPF (ref 0–5)

## 2024-03-27 PROCEDURE — 81000 URINALYSIS NONAUTO W/SCOPE: CPT | Performed by: EMERGENCY MEDICINE

## 2024-03-27 PROCEDURE — 96375 TX/PRO/DX INJ NEW DRUG ADDON: CPT

## 2024-03-27 PROCEDURE — 63600175 PHARM REV CODE 636 W HCPCS: Performed by: EMERGENCY MEDICINE

## 2024-03-27 PROCEDURE — 96374 THER/PROPH/DIAG INJ IV PUSH: CPT

## 2024-03-27 PROCEDURE — 83690 ASSAY OF LIPASE: CPT | Performed by: EMERGENCY MEDICINE

## 2024-03-27 PROCEDURE — 96376 TX/PRO/DX INJ SAME DRUG ADON: CPT

## 2024-03-27 PROCEDURE — 99285 EMERGENCY DEPT VISIT HI MDM: CPT | Mod: 25

## 2024-03-27 PROCEDURE — 80053 COMPREHEN METABOLIC PANEL: CPT | Performed by: EMERGENCY MEDICINE

## 2024-03-27 PROCEDURE — 85025 COMPLETE CBC W/AUTO DIFF WBC: CPT | Performed by: EMERGENCY MEDICINE

## 2024-03-27 RX ORDER — ONDANSETRON HYDROCHLORIDE 2 MG/ML
4 INJECTION, SOLUTION INTRAVENOUS
Status: COMPLETED | OUTPATIENT
Start: 2024-03-27 | End: 2024-03-27

## 2024-03-27 RX ORDER — TAMSULOSIN HYDROCHLORIDE 0.4 MG/1
0.4 CAPSULE ORAL DAILY
Qty: 30 CAPSULE | Refills: 0 | Status: SHIPPED | OUTPATIENT
Start: 2024-03-27 | End: 2024-04-26

## 2024-03-27 RX ORDER — MORPHINE SULFATE 4 MG/ML
4 INJECTION, SOLUTION INTRAMUSCULAR; INTRAVENOUS
Status: COMPLETED | OUTPATIENT
Start: 2024-03-27 | End: 2024-03-27

## 2024-03-27 RX ORDER — KETOROLAC TROMETHAMINE 30 MG/ML
15 INJECTION, SOLUTION INTRAMUSCULAR; INTRAVENOUS
Status: COMPLETED | OUTPATIENT
Start: 2024-03-27 | End: 2024-03-27

## 2024-03-27 RX ORDER — ONDANSETRON 4 MG/1
4 TABLET, FILM COATED ORAL EVERY 6 HOURS PRN
Qty: 12 TABLET | Refills: 0 | Status: SHIPPED | OUTPATIENT
Start: 2024-03-27

## 2024-03-27 RX ORDER — HYDROMORPHONE HYDROCHLORIDE 2 MG/ML
1 INJECTION, SOLUTION INTRAMUSCULAR; INTRAVENOUS; SUBCUTANEOUS
Status: COMPLETED | OUTPATIENT
Start: 2024-03-27 | End: 2024-03-27

## 2024-03-27 RX ORDER — OXYCODONE AND ACETAMINOPHEN 10; 325 MG/1; MG/1
1 TABLET ORAL EVERY 8 HOURS PRN
Qty: 12 TABLET | Refills: 0 | Status: SHIPPED | OUTPATIENT
Start: 2024-03-27

## 2024-03-27 RX ADMIN — ONDANSETRON 4 MG: 2 INJECTION INTRAMUSCULAR; INTRAVENOUS at 01:03

## 2024-03-27 RX ADMIN — KETOROLAC TROMETHAMINE 15 MG: 30 INJECTION, SOLUTION INTRAMUSCULAR at 10:03

## 2024-03-27 RX ADMIN — MORPHINE SULFATE 4 MG: 4 INJECTION INTRAVENOUS at 10:03

## 2024-03-27 RX ADMIN — HYDROMORPHONE HYDROCHLORIDE 1 MG: 2 INJECTION INTRAMUSCULAR; INTRAVENOUS; SUBCUTANEOUS at 01:03

## 2024-03-27 RX ADMIN — ONDANSETRON 4 MG: 2 INJECTION INTRAMUSCULAR; INTRAVENOUS at 10:03

## 2024-03-27 NOTE — DISCHARGE INSTRUCTIONS
You have a 5 mm stone in the distal right ureter causing some mild obstruction.  Your kidney function is normal however.  There is also a small cyst on her kidney.  Use ibuprofen for pain, Percocet for breakthrough pain, Flomax promote passage of the stone and Zofran for nausea.  Follow up with Urology at next available for re-evaluation return as needed for any worsening symptoms, problems, questions concerns.

## 2024-03-27 NOTE — ED NOTES
Patient unable to give urine at this time. Will check back with patient to collect sample. Patient unable to give urine at this time. Will check back with patient to collect sample.

## 2024-03-27 NOTE — ED PROVIDER NOTES
SCRIBE #1 NOTE: I, Cristi Min, am scribing for, and in the presence of, Jose G Key Jr., MD. I have scribed the entire note.      History      Chief Complaint   Patient presents with    Abdominal Pain     Low abd pain  began 3 hours ago +vomiting       Review of patient's allergies indicates:   Allergen Reactions    Keflex [cephalexin]         HPI   HPI    3/27/2024, 10:13 AM   History obtained from the patient      History of Present Illness: Kendrick Moreno is a 43 y.o. male patient who presents to the Emergency Department for lower abdominal pain, onset 3 hours PTA. Pt states that his pain radiates to his groin and bilateral flanks. Symptoms are constant and moderate in severity. No mitigating or exacerbating factors reported. Associated sxs include bilateral flank pain, n/v, and bilateral groin pain. Patient denies any fever, chills, SOB, CP, weakness, numbness, dizziness, headache, and all other sxs at this time. No prior Tx reported. No further complaints or concerns at this time.     Arrival mode: Personal vehicle    PCP: Teri Rich MD       Past Medical History:  History reviewed. No pertinent past medical history.    Past Surgical History:  History reviewed. No pertinent surgical history.      Family History:  History reviewed. No pertinent family history.    Social History:  Social History     Tobacco Use    Smoking status: Light Smoker     Current packs/day: 0.25     Average packs/day: 0.3 packs/day for 1 year (0.3 ttl pk-yrs)     Types: Cigars, Cigarettes    Smokeless tobacco: Never    Tobacco comments:     cigars   Substance and Sexual Activity    Alcohol use: Yes    Drug use: No    Sexual activity: Yes     Partners: Female     Birth control/protection: None       ROS   Review of Systems   Constitutional:  Negative for chills and fever.   HENT:  Negative for sore throat.    Respiratory:  Negative for shortness of breath.    Cardiovascular:  Negative for chest pain.   Gastrointestinal:  Positive  "for abdominal pain (lower), nausea and vomiting.   Genitourinary:  Positive for flank pain (bilateral). Negative for dysuria.        (+) bilateral groin pain   Musculoskeletal:  Negative for neck pain.   Skin:  Negative for rash.   Neurological:  Negative for dizziness, weakness, numbness and headaches.   Hematological:  Does not bruise/bleed easily.   All other systems reviewed and are negative.    Physical Exam      Initial Vitals [03/27/24 1008]   BP Pulse Resp Temp SpO2   (!) 157/81 63 (!) 22 98.3 °F (36.8 °C) 100 %      MAP       --          Physical Exam  Nursing Notes and Vital Signs Reviewed.  Constitutional: Patient is in moderate. Well-developed and well-nourished.  He gets out of the bed and hold side bends over frequently.  Head: Atraumatic. Normocephalic.  Eyes:  EOM intact.  No scleral icterus.  ENT: Mucous membranes are moist.  Nares clear   Neck:  Full ROM. No JVD.  Cardiovascular: Regular rate. Regular rhythm No murmurs, rubs, or gallops. Distal pulses are 2+ and symmetric  Pulmonary/Chest: No respiratory distress. Clear to auscultation bilaterally. No wheezing or rales.  Equal chest wall rise bilaterally  Abdominal: Soft and non-distended.  There is no tenderness.  No rebound, guarding, or rigidity. Good bowel sounds.  Genitourinary:  Right CVA tenderness.  No suprapubic tenderness  Musculoskeletal: Moves all extremities. No obvious deformities.  5 x 5 strength in all extremities   Skin: Warm and dry.  Neurological:  Alert, awake, and appropriate.  Normal speech.  No acute focal neurological deficits are appreciated.  Two through 12 intact bilaterally.  Psychiatric: Normal affect. Good eye contact. Appropriate in content.    ED Course    Procedures  ED Vital Signs:  Vitals:    03/27/24 1008 03/27/24 1044 03/27/24 1122   BP: (!) 157/81  (!) 159/77   Pulse: 63  66   Resp: (!) 22 (!) 22    Temp: 98.3 °F (36.8 °C)     TempSrc: Oral     SpO2: 100%  99%   Weight: 134 kg (295 lb 6.7 oz)     Height: 6' 2" " (1.88 m)         Abnormal Lab Results:  Labs Reviewed   URINALYSIS, REFLEX TO URINE CULTURE - Abnormal; Notable for the following components:       Result Value    Color, UA Orange (*)     Appearance, UA Cloudy (*)     Specific Gravity, UA >1.030 (*)     Protein, UA 2+ (*)     Glucose, UA Trace (*)     Ketones, UA Trace (*)     Occult Blood UA 3+ (*)     Urobilinogen, UA 2.0-3.0 (*)     Leukocytes, UA 1+ (*)     All other components within normal limits    Narrative:     Specimen Source->Urine   CBC W/ AUTO DIFFERENTIAL - Abnormal; Notable for the following components:    WBC 16.37 (*)     Hemoglobin 18.3 (*)     MCH 33.2 (*)     Gran # (ANC) 14.5 (*)     Immature Grans (Abs) 0.09 (*)     Lymph # 0.9 (*)     Gran % 88.7 (*)     Lymph % 5.4 (*)     All other components within normal limits   URINALYSIS MICROSCOPIC - Abnormal; Notable for the following components:    Hyaline Casts, UA 75 (*)     All other components within normal limits    Narrative:     Specimen Source->Urine   COMPREHENSIVE METABOLIC PANEL   LIPASE        All Lab Results:  Results for orders placed or performed during the hospital encounter of 03/27/24   Urinalysis, Reflex to Urine Culture Urine, Clean Catch    Specimen: Urine, Clean Catch   Result Value Ref Range    Specimen UA Urine, Clean Catch     Color, UA Orange (A) Yellow, Straw, Jimena    Appearance, UA Cloudy (A) Clear    pH, UA 6.0 5.0 - 8.0    Specific Gravity, UA >1.030 (A) 1.005 - 1.030    Protein, UA 2+ (A) Negative    Glucose, UA Trace (A) Negative    Ketones, UA Trace (A) Negative    Bilirubin (UA) Negative Negative    Occult Blood UA 3+ (A) Negative    Nitrite, UA Negative Negative    Urobilinogen, UA 2.0-3.0 (A) <2.0 EU/dL    Leukocytes, UA 1+ (A) Negative   Comprehensive metabolic panel   Result Value Ref Range    Sodium 139 136 - 145 mmol/L    Potassium 4.2 3.5 - 5.1 mmol/L    Chloride 104 95 - 110 mmol/L    CO2 25 23 - 29 mmol/L    Glucose 89 70 - 110 mg/dL    BUN 14 6 - 20 mg/dL     Creatinine 1.0 0.5 - 1.4 mg/dL    Calcium 10.0 8.7 - 10.5 mg/dL    Total Protein 7.6 6.0 - 8.4 g/dL    Albumin 4.6 3.5 - 5.2 g/dL    Total Bilirubin 0.6 0.1 - 1.0 mg/dL    Alkaline Phosphatase 69 55 - 135 U/L    AST 20 10 - 40 U/L    ALT 23 10 - 44 U/L    eGFR >60 >60 mL/min/1.73 m^2    Anion Gap 10 8 - 16 mmol/L   Lipase   Result Value Ref Range    Lipase 33 4 - 60 U/L   CBC auto differential   Result Value Ref Range    WBC 16.37 (H) 3.90 - 12.70 K/uL    RBC 5.52 4.60 - 6.20 M/uL    Hemoglobin 18.3 (H) 14.0 - 18.0 g/dL    Hematocrit 52.5 40.0 - 54.0 %    MCV 95 82 - 98 fL    MCH 33.2 (H) 27.0 - 31.0 pg    MCHC 34.9 32.0 - 36.0 g/dL    RDW 12.4 11.5 - 14.5 %    Platelets 166 150 - 450 K/uL    MPV 10.8 9.2 - 12.9 fL    Immature Granulocytes 0.5 0.0 - 0.5 %    Gran # (ANC) 14.5 (H) 1.8 - 7.7 K/uL    Immature Grans (Abs) 0.09 (H) 0.00 - 0.04 K/uL    Lymph # 0.9 (L) 1.0 - 4.8 K/uL    Mono # 0.8 0.3 - 1.0 K/uL    Eos # 0.0 0.0 - 0.5 K/uL    Baso # 0.08 0.00 - 0.20 K/uL    nRBC 0 0 /100 WBC    Gran % 88.7 (H) 38.0 - 73.0 %    Lymph % 5.4 (L) 18.0 - 48.0 %    Mono % 4.8 4.0 - 15.0 %    Eosinophil % 0.1 0.0 - 8.0 %    Basophil % 0.5 0.0 - 1.9 %    Differential Method Automated    Urinalysis Microscopic   Result Value Ref Range    RBC, UA 0 0 - 4 /hpf    WBC, UA 0 0 - 5 /hpf    Bacteria None None-Occ /hpf    Hyaline Casts, UA 75 (A) 0-1/lpf /lpf    Unclass Christine UA Few None-Moderate    Microscopic Comment SEE COMMENT      Imaging Results:  Imaging Results              CT Renal Stone Study ABD Pelvis WO (Final result)  Result time 03/27/24 12:19:44      Final result by Francis Clarke MD (03/27/24 12:19:44)                   Impression:      Obstructing 5 mm distal right ureteral stone with upstream right hydroureteronephrosis and mild right perinephric/periureteral fat stranding.      Electronically signed by: Francis Clarke  Date:    03/27/2024  Time:    12:19               Narrative:    EXAMINATION:  CT RENAL  STONE STUDY ABD PELVIS WO    CLINICAL HISTORY:  Flank pain, kidney stone suspected;    TECHNIQUE:  Low dose axial images, sagittal and coronal reformations were obtained from the lung bases to the pubic symphysis.  Contrast was not administered.    All CT scans at this location are performed using dose optimization techniques including the following: Automated exposure control; adjustment of the mA and/or kv; use of iterative reconstruction technique.    COMPARISON:  None    FINDINGS:  Liver is normal in size, contour, and attenuation.  No focal lesion.    Gallbladder and biliary tree are unremarkable.  No cholelithiasis or ductal dilatation.    Spleen is normal in size and attenuation.  No focal lesion.    Pancreas is normal in size, contour, and attenuation.  No focal lesion or ductal dilatation.    Adrenal glands are unremarkable.    Small exophytic cyst at the superior pole the right kidney.  There is a 5 mm stone at the distal right ureter with upstream right hydroureteronephrosis and mild right perinephric/periureteral fat stranding.  Left kidney is unremarkable in appearance.  No suspicious focal renal lesions seen.    Gastrointestinal tract is unremarkable.  No evidence of obstruction, mass lesion, or inflammation.  Appendix is normal.    Urinary bladder is unremarkable.    Prostate gland is unremarkable.    Major vessels of the abdomen and pelvis appear within normal limits although evaluation limited without intravenous contrast.    No suspicious lymphadenopathy.    No significant ascites, pneumoperitoneum, or peritoneal implant.    No acute bony abnormality.  No aggressive lytic or blastic lesion.  Mild degenerative changes in the spine.    Visualized lower lungs are clear.                                              The Emergency Provider reviewed the vital signs and test results, which are outlined above.    ED Discussion     1:09 PM: Reassessed pt at this time. Discussed with pt all pertinent ED  information and results. Discussed pt dx and plan of tx. Gave pt all f/u and return to the ED instructions. All questions and concerns were addressed at this time. Pt expresses understanding of information and instructions, and is comfortable with plan to discharge. Pt is stable for discharge.    I discussed with patient and/or family/caretaker that evaluation in the ED does not suggest any emergent or life threatening medical conditions requiring immediate intervention beyond what was provided in the ED, and I believe patient is safe for discharge.  Regardless, an unremarkable evaluation in the ED does not preclude the development or presence of a serious of life threatening condition. As such, patient was instructed to return immediately for any worsening or change in current symptoms.         ED Medication(s):  Medications   ondansetron injection 4 mg (has no administration in time range)   HYDROmorphone (PF) injection 1 mg (has no administration in time range)   ketorolac injection 15 mg (15 mg Intravenous Given 3/27/24 1044)   ondansetron injection 4 mg (4 mg Intravenous Given 3/27/24 1045)   morphine injection 4 mg (4 mg Intravenous Given 3/27/24 1044)        Follow-up Information       Nabeel Garcia MD.    Specialty: Urology  Contact information:  [address]  [place] [address] [phone]                           New Prescriptions    ONDANSETRON (ZOFRAN) 4 MG TABLET    Take 1 tablet (4 mg total) by mouth every 6 (six) hours as needed for Nausea.    OXYCODONE-ACETAMINOPHEN (PERCOCET)  MG PER TABLET    Take 1 tablet by mouth every 8 (eight) hours as needed for Pain.    TAMSULOSIN (FLOMAX) 0.4 MG CAP    Take 1 capsule (0.4 mg total) by mouth once daily.         Medical Decision Making    Medical Decision Making  Differential diagnosis:  Kidney stone, flank pain, pyelonephritis, abdominal pain, intra-abdominal infection    Patient was evaluated history physical examination.  Treat with IV  narcotic pain medication.  UA was obtained showing some cast but is otherwise benign.  Lipase was 33 CMP was normal with normal renal function.  Sixteen thousand white count mild left shift.  CT imaging was obtained that shows an obstructing 5 mm distal right ureteral stone with upstream right hydronephrosis and mild perinephric stranding.  No indication of infection in his UA however.  Patient was stable safe for discharge.  5 mm stone likely will pass with Flomax pain control and Zofran.  I provided information for follow-up with Urology if needed.  Patient verbalized understanding agreement with the plan of care seems reliable.  Stable safe for discharge in my opinion.  Pain is well-controlled    Amount and/or Complexity of Data Reviewed  Labs: ordered. Decision-making details documented in ED Course.     Details: Normal renal function, elevated white count with mild left shift.  Hyaline casts in the urine but no blood or infection  Radiology: ordered. Decision-making details documented in ED Course.     Details: 5 mm obstructing distal right ureteral calculus    Risk  OTC drugs.  Prescription drug management.  Parenteral controlled substances.  Decision regarding hospitalization.                Scribe Attestation:   Scribe #1: I performed the above scribed service and the documentation accurately describes the services I performed. I attest to the accuracy of the note.    Attending:   Physician Attestation Statement for Scribe #1: I, Jose G Key Jr., MD, personally performed the services described in this documentation, as scribed by Cristi Min, in my presence, and it is both accurate and complete.          Clinical Impression       ICD-10-CM ICD-9-CM   1. Calculus of distal right ureter  N20.1 592.1   2. Acute right flank pain  R10.9 789.09     338.19   3. Hydronephrosis, unspecified hydronephrosis type  N13.30 591       Disposition:   Disposition: Discharged  Condition: Stable         Jose G Key  MD Evelyn  03/27/24 3649

## 2024-03-28 ENCOUNTER — PATIENT MESSAGE (OUTPATIENT)
Dept: CARDIOLOGY | Facility: CLINIC | Age: 44
End: 2024-03-28
Payer: MEDICAID

## 2024-04-03 ENCOUNTER — PATIENT MESSAGE (OUTPATIENT)
Dept: PULMONOLOGY | Facility: CLINIC | Age: 44
End: 2024-04-03
Payer: MEDICAID

## 2024-04-10 RX ORDER — NITROGLYCERIN 0.4 MG/1
0.4 TABLET SUBLINGUAL EVERY 5 MIN PRN
Qty: 30 TABLET | Refills: 0 | Status: SHIPPED | OUTPATIENT
Start: 2024-04-10 | End: 2024-05-02 | Stop reason: SDUPTHER

## 2024-04-16 DIAGNOSIS — I10 ESSENTIAL HYPERTENSION: Primary | ICD-10-CM

## 2024-04-16 DIAGNOSIS — Z00.00 ROUTINE ADULT HEALTH MAINTENANCE: ICD-10-CM

## 2024-04-29 ENCOUNTER — PATIENT MESSAGE (OUTPATIENT)
Dept: PULMONOLOGY | Facility: CLINIC | Age: 44
End: 2024-04-29
Payer: MEDICAID

## 2024-05-01 ENCOUNTER — PATIENT MESSAGE (OUTPATIENT)
Dept: FAMILY MEDICINE | Facility: CLINIC | Age: 44
End: 2024-05-01
Payer: MEDICAID

## 2024-05-02 ENCOUNTER — OFFICE VISIT (OUTPATIENT)
Dept: FAMILY MEDICINE | Facility: CLINIC | Age: 44
End: 2024-05-02
Payer: MEDICAID

## 2024-05-02 ENCOUNTER — HOSPITAL ENCOUNTER (OUTPATIENT)
Dept: CARDIOLOGY | Facility: HOSPITAL | Age: 44
Discharge: HOME | End: 2024-05-02
Attending: INTERNAL MEDICINE
Payer: MEDICAID

## 2024-05-02 ENCOUNTER — OFFICE VISIT (OUTPATIENT)
Dept: CARDIOLOGY | Facility: CLINIC | Age: 44
End: 2024-05-02
Payer: MEDICAID

## 2024-05-02 VITALS
SYSTOLIC BLOOD PRESSURE: 148 MMHG | DIASTOLIC BLOOD PRESSURE: 98 MMHG | HEIGHT: 74 IN | OXYGEN SATURATION: 98 % | HEART RATE: 77 BPM | WEIGHT: 299.38 LBS | BODY MASS INDEX: 38.42 KG/M2

## 2024-05-02 VITALS
BODY MASS INDEX: 38.58 KG/M2 | SYSTOLIC BLOOD PRESSURE: 142 MMHG | HEART RATE: 83 BPM | OXYGEN SATURATION: 98 % | DIASTOLIC BLOOD PRESSURE: 92 MMHG | WEIGHT: 300.5 LBS

## 2024-05-02 DIAGNOSIS — I10 ESSENTIAL HYPERTENSION: ICD-10-CM

## 2024-05-02 DIAGNOSIS — E66.01 CLASS 3 SEVERE OBESITY WITH SERIOUS COMORBIDITY AND BODY MASS INDEX (BMI) OF 40.0 TO 44.9 IN ADULT, UNSPECIFIED OBESITY TYPE: ICD-10-CM

## 2024-05-02 DIAGNOSIS — D58.2 ELEVATED HEMOGLOBIN: ICD-10-CM

## 2024-05-02 DIAGNOSIS — R51.9 HEADACHE IN BACK OF HEAD: ICD-10-CM

## 2024-05-02 DIAGNOSIS — R00.2 PALPITATIONS: ICD-10-CM

## 2024-05-02 DIAGNOSIS — R07.9 CHEST PAIN, UNSPECIFIED TYPE: ICD-10-CM

## 2024-05-02 DIAGNOSIS — E78.49 OTHER HYPERLIPIDEMIA: ICD-10-CM

## 2024-05-02 DIAGNOSIS — G47.33 OBSTRUCTIVE SLEEP APNEA: ICD-10-CM

## 2024-05-02 DIAGNOSIS — Z00.00 ROUTINE ADULT HEALTH MAINTENANCE: ICD-10-CM

## 2024-05-02 DIAGNOSIS — I73.9 CLAUDICATION: ICD-10-CM

## 2024-05-02 DIAGNOSIS — I10 ESSENTIAL HYPERTENSION: Primary | ICD-10-CM

## 2024-05-02 DIAGNOSIS — R07.9 CHEST PAIN, UNSPECIFIED TYPE: Primary | ICD-10-CM

## 2024-05-02 DIAGNOSIS — G47.33 OSA ON CPAP: ICD-10-CM

## 2024-05-02 DIAGNOSIS — R41.3 MEMORY LOSS: ICD-10-CM

## 2024-05-02 DIAGNOSIS — R06.09 DOE (DYSPNEA ON EXERTION): ICD-10-CM

## 2024-05-02 DIAGNOSIS — R06.09 OTHER FORM OF DYSPNEA: ICD-10-CM

## 2024-05-02 DIAGNOSIS — R07.89 OTHER CHEST PAIN: ICD-10-CM

## 2024-05-02 PROCEDURE — 99214 OFFICE O/P EST MOD 30 MIN: CPT | Mod: S$PBB,,, | Performed by: FAMILY MEDICINE

## 2024-05-02 PROCEDURE — 3008F BODY MASS INDEX DOCD: CPT | Mod: CPTII,,, | Performed by: INTERNAL MEDICINE

## 2024-05-02 PROCEDURE — 93005 ELECTROCARDIOGRAM TRACING: CPT

## 2024-05-02 PROCEDURE — 3077F SYST BP >= 140 MM HG: CPT | Mod: CPTII,,, | Performed by: FAMILY MEDICINE

## 2024-05-02 PROCEDURE — 1159F MED LIST DOCD IN RCRD: CPT | Mod: CPTII,,, | Performed by: INTERNAL MEDICINE

## 2024-05-02 PROCEDURE — 3008F BODY MASS INDEX DOCD: CPT | Mod: CPTII,,, | Performed by: FAMILY MEDICINE

## 2024-05-02 PROCEDURE — 3077F SYST BP >= 140 MM HG: CPT | Mod: CPTII,,, | Performed by: INTERNAL MEDICINE

## 2024-05-02 PROCEDURE — 3080F DIAST BP >= 90 MM HG: CPT | Mod: CPTII,,, | Performed by: INTERNAL MEDICINE

## 2024-05-02 PROCEDURE — 99214 OFFICE O/P EST MOD 30 MIN: CPT | Mod: PBBFAC,25 | Performed by: INTERNAL MEDICINE

## 2024-05-02 PROCEDURE — 99999 PR PBB SHADOW E&M-EST. PATIENT-LVL III: CPT | Mod: PBBFAC,,, | Performed by: FAMILY MEDICINE

## 2024-05-02 PROCEDURE — 99999 PR PBB SHADOW E&M-EST. PATIENT-LVL IV: CPT | Mod: PBBFAC,,, | Performed by: INTERNAL MEDICINE

## 2024-05-02 PROCEDURE — 1160F RVW MEDS BY RX/DR IN RCRD: CPT | Mod: CPTII,,, | Performed by: INTERNAL MEDICINE

## 2024-05-02 PROCEDURE — 99214 OFFICE O/P EST MOD 30 MIN: CPT | Mod: S$PBB,,, | Performed by: INTERNAL MEDICINE

## 2024-05-02 PROCEDURE — 99213 OFFICE O/P EST LOW 20 MIN: CPT | Mod: PBBFAC,25,27,PO | Performed by: FAMILY MEDICINE

## 2024-05-02 PROCEDURE — 93010 ELECTROCARDIOGRAM REPORT: CPT | Mod: ,,, | Performed by: INTERNAL MEDICINE

## 2024-05-02 PROCEDURE — G2211 COMPLEX E/M VISIT ADD ON: HCPCS | Mod: S$PBB,,, | Performed by: INTERNAL MEDICINE

## 2024-05-02 PROCEDURE — 3080F DIAST BP >= 90 MM HG: CPT | Mod: CPTII,,, | Performed by: FAMILY MEDICINE

## 2024-05-02 RX ORDER — CARVEDILOL 25 MG/1
25 TABLET ORAL 2 TIMES DAILY WITH MEALS
Qty: 180 TABLET | Refills: 1 | Status: SHIPPED | OUTPATIENT
Start: 2024-05-02 | End: 2025-05-02

## 2024-05-02 RX ORDER — CLONIDINE HYDROCHLORIDE 0.1 MG/1
0.1 TABLET ORAL EVERY 6 HOURS PRN
Qty: 180 TABLET | Refills: 1 | Status: SHIPPED | OUTPATIENT
Start: 2024-05-02 | End: 2025-05-02

## 2024-05-02 RX ORDER — NIFEDIPINE 30 MG/1
30 TABLET, EXTENDED RELEASE ORAL EVERY MORNING
Qty: 30 TABLET | Refills: 11 | Status: SHIPPED | OUTPATIENT
Start: 2024-05-02 | End: 2024-05-02 | Stop reason: SDUPTHER

## 2024-05-02 RX ORDER — VALSARTAN AND HYDROCHLOROTHIAZIDE 320; 25 MG/1; MG/1
1 TABLET, FILM COATED ORAL DAILY
Qty: 90 TABLET | Refills: 1 | Status: SHIPPED | OUTPATIENT
Start: 2024-05-02 | End: 2024-05-24 | Stop reason: SDUPTHER

## 2024-05-02 RX ORDER — ISOSORBIDE MONONITRATE 30 MG/1
30 TABLET, EXTENDED RELEASE ORAL NIGHTLY
Qty: 30 TABLET | Refills: 3 | Status: SHIPPED | OUTPATIENT
Start: 2024-05-02 | End: 2024-06-04 | Stop reason: SDUPTHER

## 2024-05-02 RX ORDER — NIFEDIPINE 90 MG/1
90 TABLET, EXTENDED RELEASE ORAL EVERY MORNING
Qty: 90 TABLET | Refills: 1 | Status: SHIPPED | OUTPATIENT
Start: 2024-05-02 | End: 2025-05-02

## 2024-05-02 RX ORDER — NITROGLYCERIN 0.4 MG/1
0.4 TABLET SUBLINGUAL EVERY 5 MIN PRN
Qty: 30 TABLET | Refills: 0 | Status: SHIPPED | OUTPATIENT
Start: 2024-05-02 | End: 2024-05-22 | Stop reason: SDUPTHER

## 2024-05-02 NOTE — PROGRESS NOTES
Chief Complaint:    No chief complaint on file.      History of Present Illness:    5/2/2024:-  History of Present Illness    Patient reports experiencing heart attack symptoms. Continuing dull chest pain is experienced by the patient which radiates down his arm, indicative of a heart attack. Frequency and triggers remains unspecified. Nitroglycerin, which also results in headaches, is regularly used for relief. Previous nuclear stress test provided no positive findings. The patient notes worsening memory issues and confusion predominantly impacting his recent memory, with a growing effect on his long-term memory; this impacts his ability to participate in phone calls due to losing his train of thought. Despite unsuccessful attempts to receive help from a neuropsychologist and non-productive medication titration, these issues persist. Recurrent headaches, typically located at the back of the head, are experienced by the patient. Associated light sensitivity is also reported. Symptoms seem to improve in dark rooms. Pain is usually managed with Tylenol and Ibuprofen. Patient notes fluctuations in blood pressure, from very normal to reaching high measurements such as 180/110. He is currently using Valsartan and Coreg for management. He prefers to take the medication in the evening due to experienced side effects during daytime.  The neuropsychologist Dr. Meza appointment is next year.                  12/18/2023:-    Presents today the following complaints,   He has been following with Cardiology and Cardiology wants to do left heart catheterization but does insurance is refusing to pay for it.  He says his blood pressure spikes at times and it causes chest pain he takes nitroglycerin which relieves the pain he has been told by Cardiology to go to the ED should that happen     He is complaining of memory troubles he seems to think it is 1 of his medications cause him to not be able to remember.  He has not driving as  a result.      He is still under lot of stress related to a legal trial that is coming up his lower wants him to testify but he does not think that he has in a position to testify because of the memory issues.      01/10/2023:-  Patient is here after long time,   He says he has been to the ER with extremely high blood pressure episode systolic up in the 220s, this was accompanied by chest pain.    Patient says over the last 6 months he has been short of breath with slight exertion and he gets chest pain.  He has been extremely tired fatigued no energy and can fall asleep easily.    Denies any orthopnea  He denies any weight gain   Occasional alcohol use and does weight but is giving that up also soon.    He was given lisinopril at home but he is not brought any home readings.  He has some tingling numbness of his fingers also.      He is under a lot of stress for about a month or 2 going through some legal battles          ROS:  Review of Systems   Constitutional:  Negative for appetite change, chills and fever.   HENT:  Negative for congestion, ear pain, postnasal drip, rhinorrhea, sinus pressure and sinus pain.    Eyes:  Negative for pain.   Respiratory:  Negative for chest tightness and shortness of breath.    Cardiovascular:  Positive for chest pain. Negative for palpitations.   Gastrointestinal:  Negative for abdominal pain, blood in stool, constipation, diarrhea and nausea.   Genitourinary:  Negative for difficulty urinating, dysuria, flank pain and hematuria.   Musculoskeletal:  Negative for arthralgias and myalgias.   Skin:  Negative for pallor and wound.   Neurological:  Negative for dizziness, tremors, speech difficulty, light-headedness and headaches.   Psychiatric/Behavioral:  Negative for behavioral problems, dysphoric mood and sleep disturbance. The patient is nervous/anxious.    All other systems reviewed and are negative.      No past medical history on file.    Social History:  Social History      Socioeconomic History    Marital status:    Tobacco Use    Smoking status: Light Smoker     Current packs/day: 0.25     Average packs/day: 0.3 packs/day for 1 year (0.3 ttl pk-yrs)     Types: Cigars, Cigarettes    Smokeless tobacco: Never    Tobacco comments:     cigars   Substance and Sexual Activity    Alcohol use: Yes    Drug use: No    Sexual activity: Yes     Partners: Female     Birth control/protection: None     Social Determinants of Health     Financial Resource Strain: Low Risk  (12/18/2023)    Overall Financial Resource Strain (CARDIA)     Difficulty of Paying Living Expenses: Not very hard   Food Insecurity: No Food Insecurity (12/18/2023)    Hunger Vital Sign     Worried About Running Out of Food in the Last Year: Never true     Ran Out of Food in the Last Year: Never true   Transportation Needs: No Transportation Needs (12/18/2023)    PRAPARE - Transportation     Lack of Transportation (Medical): No     Lack of Transportation (Non-Medical): No   Physical Activity: Insufficiently Active (12/18/2023)    Exercise Vital Sign     Days of Exercise per Week: 2 days     Minutes of Exercise per Session: 20 min   Stress: Stress Concern Present (12/18/2023)    [employer] - Occupational Stress Questionnaire     Feeling of Stress : To some extent   Housing Stability: Low Risk  (12/18/2023)    Housing Stability Vital Sign     Unable to Pay for Housing in the Last Year: No     Number of Places Lived in the Last Year: 1     Unstable Housing in the Last Year: No       Family History:   family history is not on file.    Health Maintenance   Topic Date Due    Hepatitis C Screening  Never done    TETANUS VACCINE  Never done    Lipid Panel  10/18/2028       Physical Exam:    Vital Signs  Pulse: 83  SpO2: 98 %  BP: (!) 142/92  BP Location: Left arm  Patient Position: Sitting  Height and Weight  Weight: (!) 136.3 kg (300 lb 7.8 oz)]    Body mass index is 38.58 kg/m².    Physical  Exam  Vitals and nursing note reviewed.   Constitutional:       Appearance: Normal appearance.   HENT:      Head: Normocephalic and atraumatic.      Right Ear: Tympanic membrane and ear canal normal.      Left Ear: Tympanic membrane and ear canal normal.   Eyes:      Extraocular Movements: Extraocular movements intact.      Pupils: Pupils are equal, round, and reactive to light.   Cardiovascular:      Rate and Rhythm: Normal rate and regular rhythm.      Pulses: Normal pulses.      Heart sounds: Normal heart sounds. No murmur heard.     No gallop.   Pulmonary:      Effort: Pulmonary effort is normal. No respiratory distress.      Breath sounds: Normal breath sounds. No wheezing, rhonchi or rales.   Abdominal:      General: There is no distension.      Palpations: Abdomen is soft.      Tenderness: There is no abdominal tenderness.   Musculoskeletal:         General: No swelling, deformity or signs of injury. Normal range of motion.      Cervical back: Normal range of motion.   Skin:     General: Skin is warm and dry.      Capillary Refill: Capillary refill takes less than 2 seconds.      Coloration: Skin is not jaundiced or pale.   Neurological:      General: No focal deficit present.      Mental Status: He is alert and oriented to person, place, and time.   Psychiatric:         Mood and Affect: Mood normal.         Behavior: Behavior normal.           Assessment:      ICD-10-CM ICD-9-CM   1. Chest pain, unspecified type  R07.9 786.50   2. Memory loss  R41.3 780.93   3. Headache in back of head  R51.9 784.0   4. Essential hypertension  I10 401.9         Plan:         Assessment & Plan    I25.110 Atherosclerotic heart disease of native coronary artery with unstable angina pectoris  G44.1 Vascular headache, not elsewhere classified  R41.3 Other amnesia  I10 Essential (primary) hypertension  WORSENING MEMORY AND CONFUSION:  - Ordered an MRI of the brain to investigate the patient's worsening memory and confusion  issues.  - Referred the patient to a neurologist in [place] due to the unavailability of neuropsychologists.  - Advised the patient to continue the search for a neuropsychologist and to report back upon finding one.  - The patient is expected to have completed the MRI by this time.  CHEST PAIN:  - Suggested a potential need for heart catheterization if the patient's chest pain persists.  - Educated the patient about the possible causes of chest pain, including coronary blood vessel spasms.  BLOOD PRESSURE MANAGEMENT:  - Prescribed a new medication to manage fluctuating blood pressure levels.  - Instructed the patient to take this medication in the morning, alongside his existing regimen.  - Advised the patient to monitor his blood pressure 2 times daily, specifically 2 hours post medication, and to record the readings for future reference.  - Scheduled a follow up in 1 week to review the blood pressure readings.  NITROGLYCERIN SIDE EFFECTS:  - Informed the patient about the side effects of nitroglycerin, including headaches.  MEDICATION ORGANIZATION:  - Recommend the use of a pill box to organize medications.             Orders Placed This Encounter   Procedures    MRI Brain W WO Contrast    Ambulatory referral/consult to Neurology       Current Outpatient Medications   Medication Sig Dispense Refill    aspirin (ECOTRIN) 81 MG EC tablet Take 1 tablet (81 mg total) by mouth once daily. 90 tablet 1    atorvastatin (LIPITOR) 20 MG tablet Take 1 tablet (20 mg total) by mouth every evening. Changing Pravastatin to Lipitor due to insurance 90 tablet 1    carvediloL (COREG) 25 MG tablet Take 1 tablet (25 mg total) by mouth 2 (two) times daily with meals. 180 tablet 1    citalopram (CELEXA) 20 MG tablet Take 1 tablet (20 mg total) by mouth once daily. 90 tablet 3    nitroGLYCERIN (NITROSTAT) 0.4 MG SL tablet Place 1 tablet (0.4 mg total) under the tongue every 5 (five) minutes as needed for Chest pain. After 3 dose need  to call EMS 30 tablet 0    ondansetron (ZOFRAN) 4 MG tablet Take 1 tablet (4 mg total) by mouth every 6 (six) hours as needed for Nausea. 12 tablet 0    valsartan-hydrochlorothiazide (DIOVAN-HCT) 320-25 mg per tablet Take 1 tablet by mouth once daily. 90 tablet 1    NIFEdipine (PROCARDIA-XL) 30 MG (OSM) 24 hr tablet Take 1 tablet (30 mg total) by mouth every morning. 30 tablet 11    oxyCODONE-acetaminophen (PERCOCET)  mg per tablet Take 1 tablet by mouth every 8 (eight) hours as needed for Pain. (Patient not taking: Reported on 5/2/2024) 12 tablet 0    tamsulosin (FLOMAX) 0.4 mg Cap Take 1 capsule (0.4 mg total) by mouth once daily. (Patient not taking: Reported on 5/2/2024) 30 capsule 0     No current facility-administered medications for this visit.       There are no discontinued medications.    Follow up in about 1 week (around 5/9/2024).      Teri Rich MD      Scribe Attestation:   I, Ken Peter, am scribing for, and in the presence of, Dr.Arif Rich I performed the above scribed service and the documentation accurately describes the services I performed. I attest to the accuracy of the note.    I, Dr. Teri Rich, reviewed documentation as scribed above. I performed the services described in this documentation.  I agree that the record reflects my personal performance and is accurate and complete. Teri Rich MD.  01/06/2023

## 2024-05-02 NOTE — PROGRESS NOTES
Subjective:   Patient ID:  Kendrick Moreno is a 43 y.o. male who presents for cardiac consult of Blood Pressure Check    Referring Physician:    Teri Rich MD [id number] [address], [place] [address]      Reason for consult: BLAS    HPI  The patient came in today for cardiac consult of Blood Pressure Check      Kendrick Moreno is a 43 y.o. male pt with HTN, HLD, obesity, ENRIQUE, anxiety, tobacco use presents for follow up CV eval.       12/26/23  Stress ECHO 12/2023 with normal bi V function and valves, neg for ischemia. 10.1 METS. PEPPER, LE arterial u/s neg 12/2023. Holter neg 12/2023.   Calcium score is zero.     BP elevated 130s/90s. HR 80s. BMI 39 - 304 lbs.   His BP went up to 189/106 at rest. Bp occ increasing to 200s.     5/2/24  BP elevated 140s/98. HR 77. BMI 38 - 299 lbs   BP also elevated at PCP office.       FH - grandfathers - had MI; He is a contractor, usually busy.     Summary Stress ECHO 12/2023         Left Ventricle: The left ventricle is normal in size. Normal wall thickness. There is concentric remodeling. Normal wall motion. There is normal systolic function with a visually estimated ejection fraction of 55 - 70%. Ejection fraction by visual approximation is 60%. There is normal diastolic function.    Right Ventricle: Normal right ventricular cavity size. Wall thickness is normal. Right ventricle wall motion  is normal. Systolic function is normal.    Pulmonary Artery: The estimated pulmonary artery systolic pressure is 19 mmHg.    IVC/SVC: Normal venous pressure at 3 mmHg.    Stress Protocol: The patient exercised for 7 minutes 23 seconds on a Zheng protocol, corresponding to a functional capacity of 10.1 METS, achieving a peak heart rate of 150 bpm, which is 84 % of the age predicted maximum heart rate. Their exercise capacity was normal. The patient reported no symptoms during the stress test. The test was stopped because the patient requested it and they experienced shortness of  breaththe technologist noted ECG changes.    Baseline ECG: The Baseline ECG reveals sinus rhythm. The baseline ECG has early repolarization.    Stress ECG: There are no ST segment deviation identified during the protocol. There are no arrhythmias during stress. There is normal blood pressure response with stress.    ECG Conclusion: The ECG portion of the study is negative for ischemia. Sensitivity is reduced due to failure to reach target heart rate.    Post-stress Impression: The study is negative with no echocardiographic evidence of stress induced ischemia.      Conclusion LE arterial          Bilateral normal study.      Conclusion Holter 12/2023       Predominant Rhythm Sinus rhythm with heart rates varying between 55 and 132 BPM with an average of 87BPM  The diary was not returned. There were rare hookup related artifacts. The tape was adequate (0 days , 48 hours, 0 minutes  Supraventricular Arrhythmias There were very rare PACs totalling 3 and averaging 0.06 per hour.  The circadian pattern of sinus rate variability followed a typical curve.    Left main coronary artery: 0  Left anterior descending artery: 0  Circumflex artery: 0  Right coronary artery: 0  Posterior descending artery: 0     Total calcium score: 0    Results for orders placed during the hospital encounter of 03/22/23    Echo    Interpretation Summary  · Concentric remodeling and normal systolic function.  · The estimated ejection fraction is 60%.  · Normal left ventricular diastolic function.  · Normal right ventricular size with normal right ventricular systolic function.    Holter 3/2023  Conclusion       Predominant Rhythm Sinus rhythm with heart rates varying between 55 and 132 BPM with an average of 87BPM  The diary was not returned. There were rare hookup related artifacts. The tape was adequate (0 days , 48 hours, 0 minutes  Supraventricular Arrhythmias There were very rare PACs totalling 3 and averaging 0.06 per hour.  The circadian  pattern of sinus rate variability followed a typical curve.        Results for orders placed during the hospital encounter of 01/24/23    Nuclear Stress - Cardiology Interpreted    Interpretation Summary    Normal myocardial perfusion scan. There is no evidence of myocardial ischemia or infarction.    There is a  mild intensity fixed perfusion abnormality in the inferolateral wall of the left ventricle secondary to diaphragm attenuation.    The gated perfusion images showed an ejection fraction of 68% at rest. The gated perfusion images showed an ejection fraction of 68% post stress.    The ECG portion of the study is negative for ischemia.    The patient reported no chest pain during the stress test.    There were no arrhythmias during stress.    Procedure Note    PHYSICIAN INTERPRETATION AND COMMENTS: Findings are consistent with moderate, positional obstructive sleep  apnea(ENRIQUE). Therapy with CPAP indicated.       No past medical history on file.    No past surgical history on file.    Social History     Tobacco Use    Smoking status: Light Smoker     Current packs/day: 0.25     Average packs/day: 0.3 packs/day for 1 year (0.3 ttl pk-yrs)     Types: Cigars, Cigarettes    Smokeless tobacco: Never    Tobacco comments:     cigars   Substance Use Topics    Alcohol use: Yes    Drug use: No       No family history on file.    Patient's Medications   New Prescriptions    CLONIDINE (CATAPRES) 0.1 MG TABLET    Take 1 tablet (0.1 mg total) by mouth every 6 (six) hours as needed (for BP > 180/90).    ISOSORBIDE MONONITRATE (IMDUR) 30 MG 24 HR TABLET    Take 1 tablet (30 mg total) by mouth every evening.   Previous Medications    ASPIRIN (ECOTRIN) 81 MG EC TABLET    Take 1 tablet (81 mg total) by mouth once daily.    ATORVASTATIN (LIPITOR) 20 MG TABLET    Take 1 tablet (20 mg total) by mouth every evening. Changing Pravastatin to Lipitor due to insurance    CITALOPRAM (CELEXA) 20 MG TABLET    Take 1 tablet (20 mg total) by  mouth once daily.    ONDANSETRON (ZOFRAN) 4 MG TABLET    Take 1 tablet (4 mg total) by mouth every 6 (six) hours as needed for Nausea.    OXYCODONE-ACETAMINOPHEN (PERCOCET)  MG PER TABLET    Take 1 tablet by mouth every 8 (eight) hours as needed for Pain.    TAMSULOSIN (FLOMAX) 0.4 MG CAP    Take 1 capsule (0.4 mg total) by mouth once daily.   Modified Medications    Modified Medication Previous Medication    CARVEDILOL (COREG) 25 MG TABLET carvediloL (COREG) 25 MG tablet       Take 1 tablet (25 mg total) by mouth 2 (two) times daily with meals.    Take 1 tablet (25 mg total) by mouth 2 (two) times daily with meals.    NIFEDIPINE (PROCARDIA-XL) 90 MG (OSM) 24 HR TABLET NIFEdipine (PROCARDIA-XL) 30 MG (OSM) 24 hr tablet       Take 1 tablet (90 mg total) by mouth every morning.    Take 1 tablet (30 mg total) by mouth every morning.    NITROGLYCERIN (NITROSTAT) 0.4 MG SL TABLET nitroGLYCERIN (NITROSTAT) 0.4 MG SL tablet       Place 1 tablet (0.4 mg total) under the tongue every 5 (five) minutes as needed for Chest pain. After 3 dose need to call EMS    Place 1 tablet (0.4 mg total) under the tongue every 5 (five) minutes as needed for Chest pain. After 3 dose need to call EMS    VALSARTAN-HYDROCHLOROTHIAZIDE (DIOVAN-HCT) 320-25 MG PER TABLET valsartan-hydrochlorothiazide (DIOVAN-HCT) 320-25 mg per tablet       Take 1 tablet by mouth once daily.    Take 1 tablet by mouth once daily.   Discontinued Medications    No medications on file       Review of Systems   Constitutional:  Positive for malaise/fatigue.   HENT: Negative.     Eyes: Negative.    Respiratory:  Positive for shortness of breath.    Cardiovascular:  Positive for chest pain, palpitations and claudication.   Gastrointestinal: Negative.    Genitourinary: Negative.    Musculoskeletal:  Positive for back pain and joint pain.   Skin: Negative.    Neurological: Negative.    Endo/Heme/Allergies: Negative.    Psychiatric/Behavioral: Negative.     All 12  "systems otherwise negative.      Wt Readings from Last 3 Encounters:   05/02/24 135.8 kg (299 lb 6.2 oz)   05/02/24 (!) 136.3 kg (300 lb 7.8 oz)   03/27/24 134 kg (295 lb 6.7 oz)     Temp Readings from Last 3 Encounters:   03/27/24 98.9 °F (37.2 °C) (Oral)   01/10/23 97.4 °F (36.3 °C) (Tympanic)   11/16/16 98.1 °F (36.7 °C) (Oral)     BP Readings from Last 3 Encounters:   05/02/24 (!) 148/98   05/02/24 (!) 142/92   03/27/24 (!) 160/98     Pulse Readings from Last 3 Encounters:   05/02/24 77   05/02/24 83   03/27/24 66       BP (!) 148/98 (BP Location: Right arm, Patient Position: Sitting, BP Method: Medium (Manual))   Pulse 77   Ht 6' 2" (1.88 m)   Wt 135.8 kg (299 lb 6.2 oz)   SpO2 98%   BMI 38.44 kg/m²     Objective:   Physical Exam  Vitals and nursing note reviewed.   Constitutional:       General: He is not in acute distress.     Appearance: He is well-developed. He is obese. He is not diaphoretic.   HENT:      Head: Normocephalic and atraumatic.      Nose: Nose normal.   Eyes:      General: No scleral icterus.     Conjunctiva/sclera: Conjunctivae normal.   Neck:      Thyroid: No thyromegaly.      Vascular: No JVD.   Cardiovascular:      Rate and Rhythm: Normal rate and regular rhythm.      Heart sounds: S1 normal and S2 normal. No murmur heard.     No friction rub. No gallop. No S3 or S4 sounds.   Pulmonary:      Effort: Pulmonary effort is normal. No respiratory distress.      Breath sounds: Normal breath sounds. No stridor. No wheezing or rales.   Chest:      Chest wall: No tenderness.   Abdominal:      General: Bowel sounds are normal. There is no distension.      Palpations: Abdomen is soft. There is no mass.      Tenderness: There is no abdominal tenderness. There is no rebound.   Genitourinary:     Comments: Deferred  Musculoskeletal:         General: No tenderness or deformity. Normal range of motion.      Cervical back: Normal range of motion and neck supple.   Lymphadenopathy:      Cervical: No " cervical adenopathy.   Skin:     General: Skin is warm and dry.      Coloration: Skin is not pale.      Findings: No erythema or rash.   Neurological:      Mental Status: He is alert and oriented to person, place, and time.      Motor: No abnormal muscle tone.      Coordination: Coordination normal.   Psychiatric:         Behavior: Behavior normal.         Thought Content: Thought content normal.         Judgment: Judgment normal.         Lab Results   Component Value Date     03/27/2024    K 4.2 03/27/2024     03/27/2024    CO2 25 03/27/2024    BUN 14 03/27/2024    CREATININE 1.0 03/27/2024    GLU 89 03/27/2024    HGBA1C 4.7 01/11/2023    AST 20 03/27/2024    ALT 23 03/27/2024    ALBUMIN 4.6 03/27/2024    PROT 7.6 03/27/2024    BILITOT 0.6 03/27/2024    WBC 16.37 (H) 03/27/2024    HGB 18.3 (H) 03/27/2024    HCT 52.5 03/27/2024    MCV 95 03/27/2024     03/27/2024    TSH 1.179 01/11/2023    CHOL 186 10/18/2023    HDL 33 (L) 10/18/2023    LDLCALC 113.8 10/18/2023    TRIG 196 (H) 10/18/2023    BNP <10 01/11/2023     Assessment:      1. Essential hypertension    2. Class 3 severe obesity with serious comorbidity and body mass index (BMI) of 40.0 to 44.9 in adult, unspecified obesity type    3. ENRIQUE on CPAP    4. BMI 37.0-37.9, adult    5. Claudication    6. Other chest pain    7. BLAS (dyspnea on exertion)    8. Chest pain, unspecified type    9. Other form of dyspnea    10. Palpitations    11. Obstructive sleep apnea    12. Other hyperlipidemia    13. Elevated hemoglobin              Plan:       CP/BLAS, palpitations  - ECHO 3/2023 with normal bi V function and valves. Holter 3/2023 neg, AVG HR 87 bpm.   - had concern for angina - will need R/LHC - insurance denied in past  - PRN NTG, if severe/SOB rec ER eval and urgent R/LHC   - prior Stress ECHO 12/2023 with normal bi V function and valves, neg for ischemia. 10.1 METS.   - Holter neg 12/2023.   - prior CA score - negative   - order CTA coronaries      2. HTN - elevated   - titrate meds  - add Coreg 6.25 mg BID - increase to 25 mg BID  - increase ARB/HCTZ  - increase Nifed to 90mg  - add Imdur 30 mg QHS  - PRN clonidine given     3. HLD  - stopped statin  - needs repeat labs    4. Obesity BMI 37 - 294 lbs --> BMI 39 - 304 lbs. --> 299 lbs   - cont weight loss     5. Moderate ENRIQUE, h/o COVID 19   - had Paxlovid   - cont CPAP     6. Tobacco use  - has quit    7. Claudication/vibration sensation  - PEPPER, LE arterial u/s neg 12/2023. Holter neg 12/2023    8. Memory loss  - refer to neuro   - has h/o early dementia    9. Elevated Hgb  - refer to heme/onc    Has ongoing BP issues - do not advise extreme physical activity until BP resolves.     Visit today included increased complexity associated with the care of the episodic problem HTN addressed and managing the longitudinal care of the patient due to the serious and/or complex managed problem(s) .      Thank you for allowing me to participate in this patient's care. Please do not hesitate to contact me with any questions or concerns. Consult note has been forwarded to the referral physician.

## 2024-05-04 LAB
OHS QRS DURATION: 84 MS
OHS QTC CALCULATION: 406 MS

## 2024-05-07 ENCOUNTER — TELEPHONE (OUTPATIENT)
Dept: HEMATOLOGY/ONCOLOGY | Facility: CLINIC | Age: 44
End: 2024-05-07
Payer: MEDICAID

## 2024-05-07 ENCOUNTER — PATIENT MESSAGE (OUTPATIENT)
Dept: HEMATOLOGY/ONCOLOGY | Facility: CLINIC | Age: 44
End: 2024-05-07
Payer: MEDICAID

## 2024-05-13 ENCOUNTER — TELEPHONE (OUTPATIENT)
Dept: NEUROLOGY | Facility: CLINIC | Age: 44
End: 2024-05-13
Payer: MEDICAID

## 2024-05-13 ENCOUNTER — PATIENT MESSAGE (OUTPATIENT)
Dept: FAMILY MEDICINE | Facility: CLINIC | Age: 44
End: 2024-05-13
Payer: MEDICAID

## 2024-05-13 NOTE — TELEPHONE ENCOUNTER
----- Message from Td Ramos sent at 5/13/2024 11:19 AM CDT -----  Type:  Appointment Request         Name of Caller:pt  When is the first available appointment?No access  Symptoms: Memory loss [R41.3]  Headache in back of head [R51.9]      Would the patient rather a call back or a response via [facility]? Call   Best Call Back Number:[phone]   Additional Information: Pt has referral in system. Please call pt with further info and assistance. Thank you.  
Called patient about referral. Patient did not answer. Left a vm giving him the centralized scheduling number to call.   
Was done for at least one hour

## 2024-05-14 ENCOUNTER — PATIENT MESSAGE (OUTPATIENT)
Dept: FAMILY MEDICINE | Facility: CLINIC | Age: 44
End: 2024-05-14
Payer: MEDICAID

## 2024-05-15 ENCOUNTER — PATIENT MESSAGE (OUTPATIENT)
Dept: CARDIOLOGY | Facility: CLINIC | Age: 44
End: 2024-05-15
Payer: MEDICAID

## 2024-05-15 VITALS — DIASTOLIC BLOOD PRESSURE: 88 MMHG | SYSTOLIC BLOOD PRESSURE: 138 MMHG

## 2024-05-15 DIAGNOSIS — I10 ESSENTIAL HYPERTENSION: Primary | ICD-10-CM

## 2024-05-15 DIAGNOSIS — D72.829 LEUKOCYTOSIS: Primary | ICD-10-CM

## 2024-05-15 RX ORDER — HYDRALAZINE HYDROCHLORIDE 25 MG/1
25 TABLET, FILM COATED ORAL 3 TIMES DAILY
Qty: 90 TABLET | Refills: 11 | Status: SHIPPED | OUTPATIENT
Start: 2024-05-15 | End: 2024-06-04 | Stop reason: SDUPTHER

## 2024-05-15 NOTE — TELEPHONE ENCOUNTER
No care due was identified.  [facility] Embedded Care Due Messages. Reference number: [id number].   5/15/2024 9:49:53 AM CDT

## 2024-05-17 DIAGNOSIS — R07.89 OTHER CHEST PAIN: ICD-10-CM

## 2024-05-17 RX ORDER — ATORVASTATIN CALCIUM 20 MG/1
20 TABLET, FILM COATED ORAL NIGHTLY
Qty: 90 TABLET | Refills: 1 | Status: SHIPPED | OUTPATIENT
Start: 2024-05-17 | End: 2024-05-24 | Stop reason: SDUPTHER

## 2024-05-17 RX ORDER — ASPIRIN 81 MG/1
81 TABLET ORAL DAILY
Qty: 90 TABLET | Refills: 1 | Status: SHIPPED | OUTPATIENT
Start: 2024-05-17 | End: 2025-05-17

## 2024-05-20 ENCOUNTER — HOSPITAL ENCOUNTER (OUTPATIENT)
Dept: RADIOLOGY | Facility: HOSPITAL | Age: 44
Discharge: HOME | End: 2024-05-20
Attending: FAMILY MEDICINE
Payer: MEDICAID

## 2024-05-20 DIAGNOSIS — R41.3 MEMORY LOSS: ICD-10-CM

## 2024-05-20 DIAGNOSIS — R51.9 HEADACHE IN BACK OF HEAD: ICD-10-CM

## 2024-05-20 PROCEDURE — 70553 MRI BRAIN STEM W/O & W/DYE: CPT | Mod: TC

## 2024-05-20 PROCEDURE — 25500020 PHARM REV CODE 255: Performed by: FAMILY MEDICINE

## 2024-05-20 PROCEDURE — A9585 GADOBUTROL INJECTION: HCPCS | Performed by: FAMILY MEDICINE

## 2024-05-20 PROCEDURE — 70553 MRI BRAIN STEM W/O & W/DYE: CPT | Mod: 26,,, | Performed by: RADIOLOGY

## 2024-05-20 RX ORDER — GADOBUTROL 604.72 MG/ML
10 INJECTION INTRAVENOUS
Status: COMPLETED | OUTPATIENT
Start: 2024-05-20 | End: 2024-05-20

## 2024-05-20 RX ADMIN — GADOBUTROL 10 ML: 604.72 INJECTION INTRAVENOUS at 06:05

## 2024-05-22 RX ORDER — NITROGLYCERIN 0.4 MG/1
0.4 TABLET SUBLINGUAL EVERY 5 MIN PRN
Qty: 30 TABLET | Refills: 0 | Status: SHIPPED | OUTPATIENT
Start: 2024-05-22 | End: 2024-06-12 | Stop reason: SDUPTHER

## 2024-05-23 ENCOUNTER — PATIENT MESSAGE (OUTPATIENT)
Dept: CARDIOLOGY | Facility: HOSPITAL | Age: 44
End: 2024-05-23
Payer: MEDICAID

## 2024-05-23 ENCOUNTER — TELEPHONE (OUTPATIENT)
Dept: CARDIOLOGY | Facility: HOSPITAL | Age: 44
End: 2024-05-23
Payer: MEDICAID

## 2024-05-23 NOTE — TELEPHONE ENCOUNTER
I had the pleasure of discussing your upcoming Cardiac CTA scheduled for 05/24/2024 at 12:00. To review, we discussed showing up 15-20 minutes before your appointment time. Your test is located at [address] [facility] [address], [facility] on the 2nd Floor. You can access the Medical Atrium by utilizing the clinic tower entrances via the parking garage located in between [facility] and [facility].    I have reviewed your current medications that you take and I've discussed the Cardiac CTA prep for heart rate management with Dr. Buckley, the interpreting MD. He is ordering for you to take your usual AM dose of Carvedilol tomorrow morning in addition to an extra 25mg 1-hour prior to the CTA.     He is also ordering for you to HOLD your Valsartan-HCTZ (Diovan-HCT), Nifedipine (Procardia-XL), Clonidine (Catapres), and Hydralazine (Apresoline) tomorrow and bring them with you to the test. You will be directed on site when/if to take them once testing is complete. You can take your PM dose of Isosorbide mononitrate (Imdur) tonight.     Reminder for a 4-hour fasting time, no caffeine the AM of, and you can have water. It is advisable to have someone transport you to and from the test as a safety precaution. Thank you again for your time today. For any questions or concerns: I am available M-F 7:30-4, please call [phone].

## 2024-05-24 ENCOUNTER — HOSPITAL ENCOUNTER (OUTPATIENT)
Dept: RADIOLOGY | Facility: HOSPITAL | Age: 44
Discharge: HOME | End: 2024-05-24
Attending: INTERNAL MEDICINE
Payer: MEDICAID

## 2024-05-24 VITALS
OXYGEN SATURATION: 99 % | DIASTOLIC BLOOD PRESSURE: 70 MMHG | RESPIRATION RATE: 16 BRPM | HEART RATE: 67 BPM | SYSTOLIC BLOOD PRESSURE: 114 MMHG

## 2024-05-24 DIAGNOSIS — R07.9 CHEST PAIN, UNSPECIFIED TYPE: ICD-10-CM

## 2024-05-24 DIAGNOSIS — R06.09 DOE (DYSPNEA ON EXERTION): ICD-10-CM

## 2024-05-24 DIAGNOSIS — R07.89 OTHER CHEST PAIN: ICD-10-CM

## 2024-05-24 PROCEDURE — 25500020 PHARM REV CODE 255: Performed by: INTERNAL MEDICINE

## 2024-05-24 PROCEDURE — 75574 CT ANGIO HRT W/3D IMAGE: CPT | Mod: TC

## 2024-05-24 PROCEDURE — 25000242 PHARM REV CODE 250 ALT 637 W/ HCPCS: Performed by: INTERNAL MEDICINE

## 2024-05-24 PROCEDURE — 75574 CT ANGIO HRT W/3D IMAGE: CPT | Mod: 26,,, | Performed by: INTERNAL MEDICINE

## 2024-05-24 RX ORDER — ATORVASTATIN CALCIUM 20 MG/1
20 TABLET, FILM COATED ORAL NIGHTLY
Qty: 90 TABLET | Refills: 1 | Status: SHIPPED | OUTPATIENT
Start: 2024-05-24 | End: 2025-05-24

## 2024-05-24 RX ORDER — VALSARTAN AND HYDROCHLOROTHIAZIDE 320; 25 MG/1; MG/1
1 TABLET, FILM COATED ORAL DAILY
Qty: 90 TABLET | Refills: 1 | Status: SHIPPED | OUTPATIENT
Start: 2024-05-24 | End: 2025-05-24

## 2024-05-24 RX ORDER — NITROGLYCERIN 0.4 MG/1
0.4 TABLET SUBLINGUAL ONCE
Status: COMPLETED | OUTPATIENT
Start: 2024-05-24 | End: 2024-05-24

## 2024-05-24 RX ADMIN — IOHEXOL 100 ML: 350 INJECTION, SOLUTION INTRAVENOUS at 12:05

## 2024-05-24 RX ADMIN — NITROGLYCERIN 0.4 MG: 0.4 TABLET, ORALLY DISINTEGRATING SUBLINGUAL at 12:05

## 2024-05-24 NOTE — NURSING
Patient arrived to radiology for scheduled cardiac CTA.  Patient given verbal information concerning the scan, need for PIV, and side effects of contrast and NTG.  Patient verbalized understanding of information.  Patient placed on cardiac, BP, and pulse ox monitoring.  PIV 20 g placed in RAC x one attempt.  Pre-paring for pre-calcium score portion of scan.

## 2024-05-24 NOTE — NURSING
Scan complete.  Patient tolerated well.  Patient denies HA or dizziness upon sitting or standing.  PIV D/C ed.  Jelco cath intact no bleeding or hematoma noted.  Cobain dressing applied.  Patient given verbal instruction to remove cobain dressing in ten minutes.  Patient verbalized understanding of cobain dressing removal.  Patient given printed D/C instructions.  Patient D/C ed ambulatory with toshia Umana.

## 2024-05-30 ENCOUNTER — PATIENT MESSAGE (OUTPATIENT)
Dept: CARDIOLOGY | Facility: CLINIC | Age: 44
End: 2024-05-30
Payer: MEDICAID

## 2024-06-04 ENCOUNTER — OFFICE VISIT (OUTPATIENT)
Dept: CARDIOLOGY | Facility: CLINIC | Age: 44
End: 2024-06-04
Payer: MEDICAID

## 2024-06-04 ENCOUNTER — LAB VISIT (OUTPATIENT)
Dept: LAB | Facility: HOSPITAL | Age: 44
End: 2024-06-04
Payer: MEDICAID

## 2024-06-04 VITALS
HEIGHT: 74 IN | WEIGHT: 296.5 LBS | HEART RATE: 65 BPM | DIASTOLIC BLOOD PRESSURE: 98 MMHG | SYSTOLIC BLOOD PRESSURE: 148 MMHG | BODY MASS INDEX: 38.05 KG/M2 | OXYGEN SATURATION: 99 %

## 2024-06-04 DIAGNOSIS — R07.9 CHEST PAIN, UNSPECIFIED TYPE: ICD-10-CM

## 2024-06-04 DIAGNOSIS — I73.9 CLAUDICATION: ICD-10-CM

## 2024-06-04 DIAGNOSIS — R07.89 OTHER CHEST PAIN: ICD-10-CM

## 2024-06-04 DIAGNOSIS — I10 ESSENTIAL HYPERTENSION: Primary | ICD-10-CM

## 2024-06-04 DIAGNOSIS — E78.49 OTHER HYPERLIPIDEMIA: ICD-10-CM

## 2024-06-04 DIAGNOSIS — D72.829 LEUKOCYTOSIS: ICD-10-CM

## 2024-06-04 DIAGNOSIS — G47.33 OBSTRUCTIVE SLEEP APNEA: ICD-10-CM

## 2024-06-04 DIAGNOSIS — R00.2 PALPITATIONS: ICD-10-CM

## 2024-06-04 DIAGNOSIS — R06.09 OTHER FORM OF DYSPNEA: ICD-10-CM

## 2024-06-04 DIAGNOSIS — R06.09 DOE (DYSPNEA ON EXERTION): ICD-10-CM

## 2024-06-04 DIAGNOSIS — G47.33 OSA ON CPAP: ICD-10-CM

## 2024-06-04 LAB
ALBUMIN SERPL BCP-MCNC: 4.2 G/DL (ref 3.5–5.2)
ALP SERPL-CCNC: 74 U/L (ref 55–135)
ALT SERPL W/O P-5'-P-CCNC: 29 U/L (ref 10–44)
ANION GAP SERPL CALC-SCNC: 11 MMOL/L (ref 8–16)
AST SERPL-CCNC: 19 U/L (ref 10–40)
BASOPHILS # BLD AUTO: 0.06 K/UL (ref 0–0.2)
BASOPHILS NFR BLD: 0.5 % (ref 0–1.9)
BILIRUB SERPL-MCNC: 1 MG/DL (ref 0.1–1)
BUN SERPL-MCNC: 12 MG/DL (ref 6–20)
CALCIUM SERPL-MCNC: 10 MG/DL (ref 8.7–10.5)
CHLORIDE SERPL-SCNC: 105 MMOL/L (ref 95–110)
CO2 SERPL-SCNC: 21 MMOL/L (ref 23–29)
CREAT SERPL-MCNC: 0.8 MG/DL (ref 0.5–1.4)
DIFFERENTIAL METHOD BLD: ABNORMAL
EOSINOPHIL # BLD AUTO: 0.3 K/UL (ref 0–0.5)
EOSINOPHIL NFR BLD: 2.3 % (ref 0–8)
ERYTHROCYTE [DISTWIDTH] IN BLOOD BY AUTOMATED COUNT: 13.1 % (ref 11.5–14.5)
EST. GFR  (NO RACE VARIABLE): >60 ML/MIN/1.73 M^2
GLUCOSE SERPL-MCNC: 87 MG/DL (ref 70–110)
HCT VFR BLD AUTO: 48.6 % (ref 40–54)
HGB BLD-MCNC: 17 G/DL (ref 14–18)
IMM GRANULOCYTES # BLD AUTO: 0.06 K/UL (ref 0–0.04)
IMM GRANULOCYTES NFR BLD AUTO: 0.5 % (ref 0–0.5)
LYMPHOCYTES # BLD AUTO: 2.3 K/UL (ref 1–4.8)
LYMPHOCYTES NFR BLD: 20.3 % (ref 18–48)
MCH RBC QN AUTO: 32.9 PG (ref 27–31)
MCHC RBC AUTO-ENTMCNC: 35 G/DL (ref 32–36)
MCV RBC AUTO: 94 FL (ref 82–98)
MONOCYTES # BLD AUTO: 0.7 K/UL (ref 0.3–1)
MONOCYTES NFR BLD: 6 % (ref 4–15)
NEUTROPHILS # BLD AUTO: 7.8 K/UL (ref 1.8–7.7)
NEUTROPHILS NFR BLD: 70.4 % (ref 38–73)
NRBC BLD-RTO: 0 /100 WBC
PLATELET # BLD AUTO: 175 K/UL (ref 150–450)
PMV BLD AUTO: 10.9 FL (ref 9.2–12.9)
POTASSIUM SERPL-SCNC: 3.9 MMOL/L (ref 3.5–5.1)
PROT SERPL-MCNC: 7.5 G/DL (ref 6–8.4)
RBC # BLD AUTO: 5.16 M/UL (ref 4.6–6.2)
SODIUM SERPL-SCNC: 137 MMOL/L (ref 136–145)
WBC # BLD AUTO: 11.08 K/UL (ref 3.9–12.7)

## 2024-06-04 PROCEDURE — 3077F SYST BP >= 140 MM HG: CPT | Mod: CPTII,,, | Performed by: INTERNAL MEDICINE

## 2024-06-04 PROCEDURE — 99214 OFFICE O/P EST MOD 30 MIN: CPT | Mod: PBBFAC | Performed by: INTERNAL MEDICINE

## 2024-06-04 PROCEDURE — 85025 COMPLETE CBC W/AUTO DIFF WBC: CPT | Performed by: NURSE PRACTITIONER

## 2024-06-04 PROCEDURE — 3080F DIAST BP >= 90 MM HG: CPT | Mod: CPTII,,, | Performed by: INTERNAL MEDICINE

## 2024-06-04 PROCEDURE — 1160F RVW MEDS BY RX/DR IN RCRD: CPT | Mod: CPTII,,, | Performed by: INTERNAL MEDICINE

## 2024-06-04 PROCEDURE — 80053 COMPREHEN METABOLIC PANEL: CPT | Performed by: NURSE PRACTITIONER

## 2024-06-04 PROCEDURE — 1159F MED LIST DOCD IN RCRD: CPT | Mod: CPTII,,, | Performed by: INTERNAL MEDICINE

## 2024-06-04 PROCEDURE — 36415 COLL VENOUS BLD VENIPUNCTURE: CPT | Performed by: NURSE PRACTITIONER

## 2024-06-04 PROCEDURE — 85060 BLOOD SMEAR INTERPRETATION: CPT | Mod: ,,, | Performed by: STUDENT IN AN ORGANIZED HEALTH CARE EDUCATION/TRAINING PROGRAM

## 2024-06-04 PROCEDURE — 99214 OFFICE O/P EST MOD 30 MIN: CPT | Mod: S$PBB,,, | Performed by: INTERNAL MEDICINE

## 2024-06-04 PROCEDURE — G2211 COMPLEX E/M VISIT ADD ON: HCPCS | Mod: S$PBB,,, | Performed by: INTERNAL MEDICINE

## 2024-06-04 PROCEDURE — 99999 PR PBB SHADOW E&M-EST. PATIENT-LVL IV: CPT | Mod: PBBFAC,,, | Performed by: INTERNAL MEDICINE

## 2024-06-04 PROCEDURE — 3008F BODY MASS INDEX DOCD: CPT | Mod: CPTII,,, | Performed by: INTERNAL MEDICINE

## 2024-06-04 RX ORDER — HYDRALAZINE HYDROCHLORIDE 100 MG/1
100 TABLET, FILM COATED ORAL 3 TIMES DAILY
Qty: 270 TABLET | Refills: 1 | Status: SHIPPED | OUTPATIENT
Start: 2024-06-04 | End: 2025-06-04

## 2024-06-04 RX ORDER — ISOSORBIDE MONONITRATE 60 MG/1
60 TABLET, EXTENDED RELEASE ORAL NIGHTLY
Qty: 30 TABLET | Refills: 3 | Status: SHIPPED | OUTPATIENT
Start: 2024-06-04 | End: 2025-06-04

## 2024-06-04 NOTE — PROGRESS NOTES
Subjective:   Patient ID:  Kendrick Moreno is a 44 y.o. male who presents for cardiac consult of Follow-up (3 week cardiac CTA. Pt states BP has been fluctuating higher than normal the past 2 days. Pt BP read 197/104, pt took clonidine. BP slowly . )    Referring Physician:    Teri Rich MD [[id number] [place] [address]      Reason for consult: BLAS    HPI  The patient came in today for cardiac consult of Follow-up (3 week cardiac CTA. Pt states BP has been fluctuating higher than normal the past 2 days. Pt BP read 197/104, pt took clonidine. BP slowly . )      Kendrick Moreno is a 44 y.o. male pt with HTN, HLD, obesity, ENRIQUE, anxiety, tobacco use presents for follow up CV eval.       23  Stress ECHO 2023 with normal bi V function and valves, neg for ischemia. 10.1 METS. PEPPER, LE arterial u/s neg 2023. Holter neg 2023.   Calcium score is zero.     BP elevated 130s/90s. HR 80s. BMI 39 - 304 lbs.   His BP went up to 189/106 at rest. Bp occ increasing to 200s.     24  BP elevated 140s/98. HR 77. BMI 38 - 299 lbs   BP also elevated at PCP office.     24  BP elevated today 140s/90. HR 60s. BMI 38  Cardiac CTA neg.     FH - grandfathers - had MI; He is a contractor, usually busy.       Coronary angiography     1.  The left main is normal.  2.  The LAD gave origin to a moderate-sized 1st diagonal branch and extends around the apex.  It is free of disease.  3.  The ramus intermedius is a moderate size and free of disease.  4.  A large very proximally originating 1st marginal branch is present.  This is bifurcating early in its course.  It is free of disease.  The distal circumflex gives origin to a moderate size posterolateral branch and a moderate-sized left PDA.  5.  The right coronary artery is small and non dominant.     Impression     1.  Normal coronary arteries  2.  Coronary calcium score of 0  3.  Normal left ventricular systolic function with no wall motion  abnormalities  4.  Normal ascending aorta        Electronically signed by:Brandon Murphy  Date:                                            05/24/2024  Time:                                           17:59    Summary Stress ECHO 12/2023         Left Ventricle: The left ventricle is normal in size. Normal wall thickness. There is concentric remodeling. Normal wall motion. There is normal systolic function with a visually estimated ejection fraction of 55 - 70%. Ejection fraction by visual approximation is 60%. There is normal diastolic function.    Right Ventricle: Normal right ventricular cavity size. Wall thickness is normal. Right ventricle wall motion  is normal. Systolic function is normal.    Pulmonary Artery: The estimated pulmonary artery systolic pressure is 19 mmHg.    IVC/SVC: Normal venous pressure at 3 mmHg.    Stress Protocol: The patient exercised for 7 minutes 23 seconds on a Zheng protocol, corresponding to a functional capacity of 10.1 METS, achieving a peak heart rate of 150 bpm, which is 84 % of the age predicted maximum heart rate. Their exercise capacity was normal. The patient reported no symptoms during the stress test. The test was stopped because the patient requested it and they experienced shortness of breaththe technologist noted ECG changes.    Baseline ECG: The Baseline ECG reveals sinus rhythm. The baseline ECG has early repolarization.    Stress ECG: There are no ST segment deviation identified during the protocol. There are no arrhythmias during stress. There is normal blood pressure response with stress.    ECG Conclusion: The ECG portion of the study is negative for ischemia. Sensitivity is reduced due to failure to reach target heart rate.    Post-stress Impression: The study is negative with no echocardiographic evidence of stress induced ischemia.      Conclusion LE arterial          Bilateral normal study.      Conclusion Holter 12/2023       Predominant Rhythm Sinus rhythm  with heart rates varying between 55 and 132 BPM with an average of 87BPM  The diary was not returned. There were rare hookup related artifacts. The tape was adequate (0 days , 48 hours, 0 minutes  Supraventricular Arrhythmias There were very rare PACs totalling 3 and averaging 0.06 per hour.  The circadian pattern of sinus rate variability followed a typical curve.    Left main coronary artery: 0  Left anterior descending artery: 0  Circumflex artery: 0  Right coronary artery: 0  Posterior descending artery: 0     Total calcium score: 0    Results for orders placed during the hospital encounter of 03/22/23    Echo    Interpretation Summary  · Concentric remodeling and normal systolic function.  · The estimated ejection fraction is 60%.  · Normal left ventricular diastolic function.  · Normal right ventricular size with normal right ventricular systolic function.    Holter 3/2023  Conclusion       Predominant Rhythm Sinus rhythm with heart rates varying between 55 and 132 BPM with an average of 87BPM  The diary was not returned. There were rare hookup related artifacts. The tape was adequate (0 days , 48 hours, 0 minutes  Supraventricular Arrhythmias There were very rare PACs totalling 3 and averaging 0.06 per hour.  The circadian pattern of sinus rate variability followed a typical curve.        Results for orders placed during the hospital encounter of 01/24/23    Nuclear Stress - Cardiology Interpreted    Interpretation Summary    Normal myocardial perfusion scan. There is no evidence of myocardial ischemia or infarction.    There is a  mild intensity fixed perfusion abnormality in the inferolateral wall of the left ventricle secondary to diaphragm attenuation.    The gated perfusion images showed an ejection fraction of 68% at rest. The gated perfusion images showed an ejection fraction of 68% post stress.    The ECG portion of the study is negative for ischemia.    The patient reported no chest pain during the  stress test.    There were no arrhythmias during stress.    Procedure Note    PHYSICIAN INTERPRETATION AND COMMENTS: Findings are consistent with moderate, positional obstructive sleep  apnea(ENRIQUE). Therapy with CPAP indicated.       History reviewed. No pertinent past medical history.    History reviewed. No pertinent surgical history.    Social History     Tobacco Use    Smoking status: Light Smoker     Current packs/day: 0.25     Average packs/day: 0.3 packs/day for 1 year (0.3 ttl pk-yrs)     Types: Cigars, Cigarettes    Smokeless tobacco: Never    Tobacco comments:     cigars   Substance Use Topics    Alcohol use: Yes    Drug use: No       No family history on file.    Patient's Medications   New Prescriptions    No medications on file   Previous Medications    ASPIRIN (ECOTRIN) 81 MG EC TABLET    Take 1 tablet (81 mg total) by mouth once daily.    ATORVASTATIN (LIPITOR) 20 MG TABLET    Take 1 tablet (20 mg total) by mouth every evening. Changing Pravastatin to Lipitor due to insurance    CARVEDILOL (COREG) 25 MG TABLET    Take 1 tablet (25 mg total) by mouth 2 (two) times daily with meals.    CITALOPRAM (CELEXA) 20 MG TABLET    Take 1 tablet (20 mg total) by mouth once daily.    CLONIDINE (CATAPRES) 0.1 MG TABLET    Take 1 tablet (0.1 mg total) by mouth every 6 (six) hours as needed (for BP > 180/90).    NIFEDIPINE (PROCARDIA-XL) 90 MG (OSM) 24 HR TABLET    Take 1 tablet (90 mg total) by mouth every morning.    NITROGLYCERIN (NITROSTAT) 0.4 MG SL TABLET    Place 1 tablet (0.4 mg total) under the tongue every 5 (five) minutes as needed for Chest pain. After 3 dose need to call EMS    ONDANSETRON (ZOFRAN) 4 MG TABLET    Take 1 tablet (4 mg total) by mouth every 6 (six) hours as needed for Nausea.    OXYCODONE-ACETAMINOPHEN (PERCOCET)  MG PER TABLET    Take 1 tablet by mouth every 8 (eight) hours as needed for Pain.    TAMSULOSIN (FLOMAX) 0.4 MG CAP    Take 1 capsule (0.4 mg total) by mouth once daily.     "VALSARTAN-HYDROCHLOROTHIAZIDE (DIOVAN-HCT) 320-25 MG PER TABLET    Take 1 tablet by mouth once daily.   Modified Medications    Modified Medication Previous Medication    HYDRALAZINE (APRESOLINE) 100 MG TABLET hydrALAZINE (APRESOLINE) 25 MG tablet       Take 1 tablet (100 mg total) by mouth 3 (three) times daily.    Take 1 tablet (25 mg total) by mouth 3 (three) times daily.    ISOSORBIDE MONONITRATE (IMDUR) 60 MG 24 HR TABLET isosorbide mononitrate (IMDUR) 30 MG 24 hr tablet       Take 1 tablet (60 mg total) by mouth every evening.    Take 1 tablet (30 mg total) by mouth every evening.   Discontinued Medications    No medications on file       Review of Systems   Constitutional:  Positive for malaise/fatigue.   HENT: Negative.     Eyes: Negative.    Respiratory:  Positive for shortness of breath.    Cardiovascular:  Positive for chest pain, palpitations and claudication.   Gastrointestinal: Negative.    Genitourinary: Negative.    Musculoskeletal:  Positive for back pain and joint pain.   Skin: Negative.    Neurological: Negative.    Endo/Heme/Allergies: Negative.    Psychiatric/Behavioral: Negative.     All 12 systems otherwise negative.      Wt Readings from Last 3 Encounters:   06/04/24 134.5 kg (296 lb 8.3 oz)   05/02/24 135.8 kg (299 lb 6.2 oz)   05/02/24 (!) 136.3 kg (300 lb 7.8 oz)     Temp Readings from Last 3 Encounters:   03/27/24 98.9 °F (37.2 °C) (Oral)   01/10/23 97.4 °F (36.3 °C) (Tympanic)   11/16/16 98.1 °F (36.7 °C) (Oral)     BP Readings from Last 3 Encounters:   06/04/24 (!) 148/98   05/24/24 114/70   05/15/24 138/88     Pulse Readings from Last 3 Encounters:   06/04/24 65   05/24/24 67   05/02/24 77       BP (!) 148/98 (BP Location: Right arm, Patient Position: Sitting, BP Method: Medium (Manual))   Pulse 65   Ht 6' 2" (1.88 m)   Wt 134.5 kg (296 lb 8.3 oz)   SpO2 99%   BMI 38.07 kg/m²     Objective:   Physical Exam  Vitals and nursing note reviewed.   Constitutional:       General: He is " not in acute distress.     Appearance: He is well-developed. He is obese. He is not diaphoretic.   HENT:      Head: Normocephalic and atraumatic.      Nose: Nose normal.   Eyes:      General: No scleral icterus.     Conjunctiva/sclera: Conjunctivae normal.   Neck:      Thyroid: No thyromegaly.      Vascular: No JVD.   Cardiovascular:      Rate and Rhythm: Normal rate and regular rhythm.      Heart sounds: S1 normal and S2 normal. No murmur heard.     No friction rub. No gallop. No S3 or S4 sounds.   Pulmonary:      Effort: Pulmonary effort is normal. No respiratory distress.      Breath sounds: Normal breath sounds. No stridor. No wheezing or rales.   Chest:      Chest wall: No tenderness.   Abdominal:      General: Bowel sounds are normal. There is no distension.      Palpations: Abdomen is soft. There is no mass.      Tenderness: There is no abdominal tenderness. There is no rebound.   Genitourinary:     Comments: Deferred  Musculoskeletal:         General: No tenderness or deformity. Normal range of motion.      Cervical back: Normal range of motion and neck supple.   Lymphadenopathy:      Cervical: No cervical adenopathy.   Skin:     General: Skin is warm and dry.      Coloration: Skin is not pale.      Findings: No erythema or rash.   Neurological:      Mental Status: He is alert and oriented to person, place, and time.      Motor: No abnormal muscle tone.      Coordination: Coordination normal.   Psychiatric:         Behavior: Behavior normal.         Thought Content: Thought content normal.         Judgment: Judgment normal.         Lab Results   Component Value Date     03/27/2024    K 4.2 03/27/2024     03/27/2024    CO2 25 03/27/2024    BUN 14 03/27/2024    CREATININE 1.0 03/27/2024    GLU 89 03/27/2024    HGBA1C 4.7 01/11/2023    AST 20 03/27/2024    ALT 23 03/27/2024    ALBUMIN 4.6 03/27/2024    PROT 7.6 03/27/2024    BILITOT 0.6 03/27/2024    WBC 16.37 (H) 03/27/2024    HGB 18.3 (H)  03/27/2024    HCT 52.5 03/27/2024    MCV 95 03/27/2024     03/27/2024    TSH 1.179 01/11/2023    CHOL 186 10/18/2023    HDL 33 (L) 10/18/2023    LDLCALC 113.8 10/18/2023    TRIG 196 (H) 10/18/2023    BNP <10 01/11/2023     Assessment:      1. Essential hypertension    2. BMI 37.0-37.9, adult    3. ENRIQUE on CPAP    4. Claudication    5. BLAS (dyspnea on exertion)    6. Other chest pain    7. Chest pain, unspecified type    8. Palpitations    9. Other form of dyspnea    10. Obstructive sleep apnea    11. Other hyperlipidemia            Plan:       CP/BLAS, palpitations - atypical   - ECHO 3/2023 with normal bi V function and valves. Holter 3/2023 neg, AVG HR 87 bpm.   - PRN NTG,  - prior Stress ECHO 12/2023 with normal bi V function and valves, neg for ischemia. 10.1 METS.   - Holter neg 12/2023.   - prior CA score - negative   - CTA coronaries neg     2. HTN - elevated   - titrate meds  - add Coreg 6.25 mg BID - increase to 25 mg BID  - increase ARB/HCTZ  - increase Nifed to 90mg  - add Imdur 30 mg QHS --increase to 60mg   - PRN clonidine given   - increase hydral to 100mg TID     3. HLD  - stopped statin  - needs repeat labs    4. Obesity BMI 37 - 294 lbs --> BMI 39 - 304 lbs. --> 299 lbs   - cont weight loss     5. Moderate ENRIQUE, h/o COVID 19   - had Paxlovid   - cont CPAP     6. Tobacco use  - has quit    7. Claudication/vibration sensation  - PEPPER, LE arterial u/s neg 12/2023. Holter neg 12/2023    8. Memory loss  - refer to neuro   - has h/o early dementia    9. Elevated Hgb  - refer to heme/onc    10. Anxiety  - will see psych   - rec meditation     Has ongoing BP issues - do not advise extreme physical activity until BP resolves.     Visit today included increased complexity associated with the care of the episodic problem HTN addressed and managing the longitudinal care of the patient due to the serious and/or complex managed problem(s)      Thank you for allowing me to participate in this patient's care.  Please do not hesitate to contact me with any questions or concerns. Consult note has been forwarded to the referral physician.

## 2024-06-05 ENCOUNTER — PATIENT MESSAGE (OUTPATIENT)
Dept: FAMILY MEDICINE | Facility: CLINIC | Age: 44
End: 2024-06-05
Payer: MEDICAID

## 2024-06-05 LAB
PATH REV BLD -IMP: NORMAL
PATH REV BLD -IMP: NORMAL

## 2024-06-12 RX ORDER — NITROGLYCERIN 0.4 MG/1
0.4 TABLET SUBLINGUAL EVERY 5 MIN PRN
Qty: 30 TABLET | Refills: 1 | Status: SHIPPED | OUTPATIENT
Start: 2024-06-12 | End: 2025-06-12

## 2024-06-13 ENCOUNTER — OFFICE VISIT (OUTPATIENT)
Dept: HEMATOLOGY/ONCOLOGY | Facility: CLINIC | Age: 44
End: 2024-06-13
Payer: MEDICAID

## 2024-06-13 DIAGNOSIS — E66.01 CLASS 2 SEVERE OBESITY DUE TO EXCESS CALORIES WITH SERIOUS COMORBIDITY AND BODY MASS INDEX (BMI) OF 38.0 TO 38.9 IN ADULT: ICD-10-CM

## 2024-06-13 DIAGNOSIS — G47.33 OSA ON CPAP: Primary | ICD-10-CM

## 2024-06-13 DIAGNOSIS — D58.2 ELEVATED HEMOGLOBIN: ICD-10-CM

## 2024-06-13 DIAGNOSIS — D75.1 POLYCYTHEMIA: ICD-10-CM

## 2024-06-13 PROCEDURE — 1159F MED LIST DOCD IN RCRD: CPT | Mod: CPTII,95,, | Performed by: NURSE PRACTITIONER

## 2024-06-13 PROCEDURE — 99204 OFFICE O/P NEW MOD 45 MIN: CPT | Mod: 95,,, | Performed by: NURSE PRACTITIONER

## 2024-06-13 PROCEDURE — 1160F RVW MEDS BY RX/DR IN RCRD: CPT | Mod: CPTII,95,, | Performed by: NURSE PRACTITIONER

## 2024-06-13 RX ORDER — LIDOCAINE HYDROCHLORIDE 10 MG/ML
1 INJECTION, SOLUTION EPIDURAL; INFILTRATION; INTRACAUDAL; PERINEURAL ONCE
OUTPATIENT
Start: 2024-06-13 | End: 2024-06-13

## 2024-06-13 NOTE — PROGRESS NOTES
The patient location is: home  The chief complaint leading to consultation is: no complaints    Visit type: audiovisual    Face to Face time with patient: 35  55 minutes of total time spent on the encounter, which includes face to face time and non-face to face time preparing to see the patient (eg, review of tests), Obtaining and/or reviewing separately obtained history, Documenting clinical information in the electronic or other health record, Independently interpreting results (not separately reported) and communicating results to the patient/family/caregiver, or Care coordination (not separately reported).     Each patient to whom he or she provides medical services by telemedicine is:  (1) informed of the relationship between the physician and patient and the respective role of any other health care provider with respect to management of the patient; and (2) notified that he or she may decline to receive medical services by telemedicine and may withdraw from such care at any time.    Patient ID: Kendrick Moreno is a 44 y.o. male.    Chief Complaint: no complaints    HPI:  45 yo male presents to the hematology clinic as a new patient consulted for further evaluation and treatment of polycythemia.  He has been referred to the clinic by his cardiologist, Dr. Lopez.     Used to smoke cigarettes - 1 pack/day for 15 years.  Quit about 4 years ago.  Now occasional vapes a couple of times/week.  Denies any illicit drug use.  Has social alcohol on occasion.      Denies itching or excessive headaches.  Denies previous history of blood clots.    Family hx of blood disorders:  None known.    Family hx of cancer:  Maternal aunt: lung cancer - smoker  Maternal aunt: lung cancer- smoker  Maternal uncle: lung cancer - smoker  Maternal uncle: pancreatic cancer    Has not had baseline psa testing.    States that he is using his cpap about 2-6 hours a night since sleep study in 2/2023.    Has gained some weight over the last several  years - up to 301, but now is losing weight and is down to 280's intentionally.    Denies use of testosterone or supplements.  Denies frequent high altitude travel.      Recently found with moderate position obstructive sleep apnea. Therapy with CPAP indicated.   I have reviewed all of the patient's relevant lab work available in the medical record and have utilized this in my evaluation and management recommendations today.      Social History     Socioeconomic History    Marital status:    Tobacco Use    Smoking status: Light Smoker     Current packs/day: 0.25     Average packs/day: 0.3 packs/day for 1 year (0.3 ttl pk-yrs)     Types: Cigars, Cigarettes    Smokeless tobacco: Never    Tobacco comments:     cigars   Substance and Sexual Activity    Alcohol use: Yes    Drug use: No    Sexual activity: Yes     Partners: Female     Birth control/protection: None     Social Determinants of Health     Financial Resource Strain: Low Risk  (12/18/2023)    Overall Financial Resource Strain (CARDIA)     Difficulty of Paying Living Expenses: Not very hard   Food Insecurity: No Food Insecurity (12/18/2023)    Hunger Vital Sign     Worried About Running Out of Food in the Last Year: Never true     Ran Out of Food in the Last Year: Never true   Transportation Needs: No Transportation Needs (12/18/2023)    PRAPARE - Transportation     Lack of Transportation (Medical): No     Lack of Transportation (Non-Medical): No   Physical Activity: Insufficiently Active (12/18/2023)    Exercise Vital Sign     Days of Exercise per Week: 2 days     Minutes of Exercise per Session: 20 min   Stress: Stress Concern Present (12/18/2023)    [employer] - Occupational Stress Questionnaire     Feeling of Stress : To some extent   Housing Stability: Low Risk  (12/18/2023)    Housing Stability Vital Sign     Unable to Pay for Housing in the Last Year: No     Number of Places Lived in the Last Year: 1     Unstable  Housing in the Last Year: No       No family history on file.    History reviewed. No pertinent surgical history.    History reviewed. No pertinent past medical history.    Review of Systems   Constitutional:  Negative for appetite change and unexpected weight change.   HENT:  Negative for mouth sores.    Eyes:  Negative for visual disturbance.   Respiratory:  Negative for cough and shortness of breath.    Cardiovascular:  Negative for chest pain.   Gastrointestinal:  Negative for abdominal pain and diarrhea.   Endocrine: Negative.    Genitourinary:  Negative for frequency.   Musculoskeletal:  Negative for back pain.   Skin:  Negative for rash.   Allergic/Immunologic: Negative.    Neurological:  Negative for headaches.   Hematological:  Negative for adenopathy.   Psychiatric/Behavioral:  The patient is not nervous/anxious.           Medication List with Changes/Refills   Current Medications    ASPIRIN (ECOTRIN) 81 MG EC TABLET    Take 1 tablet (81 mg total) by mouth once daily.    ATORVASTATIN (LIPITOR) 20 MG TABLET    Take 1 tablet (20 mg total) by mouth every evening. Changing Pravastatin to Lipitor due to insurance    CARVEDILOL (COREG) 25 MG TABLET    Take 1 tablet (25 mg total) by mouth 2 (two) times daily with meals.    CITALOPRAM (CELEXA) 20 MG TABLET    Take 1 tablet (20 mg total) by mouth once daily.    CLONIDINE (CATAPRES) 0.1 MG TABLET    Take 1 tablet (0.1 mg total) by mouth every 6 (six) hours as needed (for BP > 180/90).    HYDRALAZINE (APRESOLINE) 100 MG TABLET    Take 1 tablet (100 mg total) by mouth 3 (three) times daily.    ISOSORBIDE MONONITRATE (IMDUR) 60 MG 24 HR TABLET    Take 1 tablet (60 mg total) by mouth every evening.    NIFEDIPINE (PROCARDIA-XL) 90 MG (OSM) 24 HR TABLET    Take 1 tablet (90 mg total) by mouth every morning.    NITROGLYCERIN (NITROSTAT) 0.4 MG SL TABLET    Place 1 tablet (0.4 mg total) under the tongue every 5 (five) minutes as needed for Chest pain. After 3 dose need to  call EMS    ONDANSETRON (ZOFRAN) 4 MG TABLET    Take 1 tablet (4 mg total) by mouth every 6 (six) hours as needed for Nausea.    OXYCODONE-ACETAMINOPHEN (PERCOCET)  MG PER TABLET    Take 1 tablet by mouth every 8 (eight) hours as needed for Pain.    TAMSULOSIN (FLOMAX) 0.4 MG CAP    Take 1 capsule (0.4 mg total) by mouth once daily.    VALSARTAN-HYDROCHLOROTHIAZIDE (DIOVAN-HCT) 320-25 MG PER TABLET    Take 1 tablet by mouth once daily.        Objective:   There were no vitals filed for this visit.    Physical Exam  Constitutional:       Appearance: Normal appearance.   Pulmonary:      Effort: Pulmonary effort is normal.   Neurological:      Mental Status: He is alert and oriented to person, place, and time.   Psychiatric:         Mood and Affect: Mood normal.         Behavior: Behavior normal.         Thought Content: Thought content normal.         Judgment: Judgment normal.         Assessment:     Problem List Items Addressed This Visit          Oncology    Polycythemia    Relevant Orders    MPN (JAK2 V617F)WITH REFLEX TO CALR,MPL    JAK2 Exon 12 And Other Non-V617 Mutation Detection, Bld    Erythropoietin       Other    ENRIQUE on CPAP - Primary     Other Visit Diagnoses       Elevated hemoglobin        Class 2 severe obesity due to excess calories with serious comorbidity and body mass index (BMI) of 38.0 to 38.9 in adult                Lab Results   Component Value Date    WBC 11.08 06/04/2024    RBC 5.16 06/04/2024    HGB 17.0 06/04/2024    HCT 48.6 06/04/2024    MCV 94 06/04/2024    MCH 32.9 (H) 06/04/2024    MCHC 35.0 06/04/2024    RDW 13.1 06/04/2024     06/04/2024    MPV 10.9 06/04/2024    GRAN 7.8 (H) 06/04/2024    GRAN 70.4 06/04/2024    LYMPH 2.3 06/04/2024    LYMPH 20.3 06/04/2024    MONO 0.7 06/04/2024    MONO 6.0 06/04/2024    EOS 0.3 06/04/2024    BASO 0.06 06/04/2024    EOSINOPHIL 2.3 06/04/2024    BASOPHIL 0.5 06/04/2024      Lab Results   Component Value Date     06/04/2024    K  "3.9 06/04/2024     06/04/2024    CO2 21 (L) 06/04/2024    BUN 12 06/04/2024    CREATININE 0.8 06/04/2024    CALCIUM 10.0 06/04/2024    ANIONGAP 11 06/04/2024    ESTGFRAFRICA >60 11/16/2016    EGFRNONAA >60 11/16/2016     Lab Results   Component Value Date    ALT 29 06/04/2024    AST 19 06/04/2024    ALKPHOS 74 06/04/2024    BILITOT 1.0 06/04/2024     No results found for: "IRON", "TRANSFERRIN", "TIBC", "FESATURATED", "FERRITIN"     Plan:   ENRIQUE on CPAP    Elevated hemoglobin  -     Ambulatory referral/consult to Hematology / Oncology    Class 2 severe obesity due to excess calories with serious comorbidity and body mass index (BMI) of 38.0 to 38.9 in adult    Polycythemia  -     MPN (JAK2 V617F)WITH REFLEX TO CALR,MPL; Future; Expected date: 06/13/2024  -     JAK2 Exon 12 And Other Non-V617 Mutation Detection, Bld; Future; Expected date: 06/13/2024  -     Erythropoietin; Future; Expected date: 06/13/2024    Other orders  -     LIDOcaine (PF) 10 mg/ml (1%) injection 10 mg      Med Onc Chart Routing      Follow up with physician    Follow up with [facility] 3 months. VV - cbc prior   Infusion scheduling note   possible phlebotomy for hct >55%   Injection scheduling note n/a   Labs   Scheduling:  Preferred lab:  [address]  Lab interval:  mpn reflexive studies, jak2 exon 12 and other non, erythropoetin - 6/20/2024 - cbc in 3 months prior to VV   Imaging   N/a   Pharmacy appointment No pharmacy appointment needed      Other referrals       No additional referrals needed  n/a          If found with PV will schedule phlebotomy with goal hct < 45%.  If secondary ( most likely) will schedule 1 unit phlebotomy with goal hct <55%.  Recommend more consistent use of cpap while sleeping, sleeping on side, and weight loss. Patient will do labs next Thursday to r/o primary - pv.  Will f/u with labs in 3 months -(pending labs) for cbc - possible phlebotomy.     Collaborating Provider:  Dr. Twin Hernandez    Thank You,  Martin Cali, " FNP-C  [facility] H[facility]ology

## 2024-06-14 DIAGNOSIS — D75.1 POLYCYTHEMIA: Primary | ICD-10-CM

## 2024-06-19 DIAGNOSIS — R06.02 SHORTNESS OF BREATH: Primary | ICD-10-CM

## 2024-06-19 DIAGNOSIS — D75.1 POLYCYTHEMIA: ICD-10-CM

## 2024-06-19 DIAGNOSIS — Z87.891 HISTORY OF CIGARETTE SMOKING: ICD-10-CM

## 2024-06-20 ENCOUNTER — TELEPHONE (OUTPATIENT)
Dept: HEMATOLOGY/ONCOLOGY | Facility: CLINIC | Age: 44
End: 2024-06-20
Payer: MEDICAID

## 2024-06-20 ENCOUNTER — LAB VISIT (OUTPATIENT)
Dept: LAB | Facility: HOSPITAL | Age: 44
End: 2024-06-20
Attending: NURSE PRACTITIONER
Payer: MEDICAID

## 2024-06-20 DIAGNOSIS — D75.1 POLYCYTHEMIA: ICD-10-CM

## 2024-06-20 PROCEDURE — 0027U JAK2 GENE TRGT SEQ ALYS: CPT | Performed by: NURSE PRACTITIONER

## 2024-06-20 PROCEDURE — 36415 COLL VENOUS BLD VENIPUNCTURE: CPT | Mod: PO | Performed by: NURSE PRACTITIONER

## 2024-06-20 PROCEDURE — 82668 ASSAY OF ERYTHROPOIETIN: CPT | Performed by: NURSE PRACTITIONER

## 2024-06-20 PROCEDURE — 81270 JAK2 GENE: CPT | Performed by: NURSE PRACTITIONER

## 2024-06-20 NOTE — TELEPHONE ENCOUNTER
Nurse spoke with pt in regards to scheduling ct scan. Pt verbalized understanding of scheduled appt. Call ended well
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
## 2024-06-24 ENCOUNTER — OFFICE VISIT (OUTPATIENT)
Dept: NEUROLOGY | Facility: CLINIC | Age: 44
End: 2024-06-24
Payer: MEDICAID

## 2024-06-24 DIAGNOSIS — F33.0 MAJOR DEPRESSIVE DISORDER, RECURRENT EPISODE, MILD WITH ANXIOUS DISTRESS: ICD-10-CM

## 2024-06-24 DIAGNOSIS — R41.9 COGNITIVE COMPLAINTS: Primary | ICD-10-CM

## 2024-06-24 LAB — EPO SERPL-ACNC: 6.1 MIU/ML (ref 2.6–18.5)

## 2024-06-25 PROBLEM — R41.9 COGNITIVE COMPLAINTS: Status: ACTIVE | Noted: 2024-06-25

## 2024-06-27 ENCOUNTER — HOSPITAL ENCOUNTER (OUTPATIENT)
Dept: RADIOLOGY | Facility: HOSPITAL | Age: 44
Discharge: HOME | End: 2024-06-27
Attending: NURSE PRACTITIONER
Payer: MEDICAID

## 2024-06-27 DIAGNOSIS — Z87.891 HISTORY OF CIGARETTE SMOKING: ICD-10-CM

## 2024-06-27 DIAGNOSIS — D75.1 POLYCYTHEMIA: ICD-10-CM

## 2024-06-27 DIAGNOSIS — R06.02 SHORTNESS OF BREATH: ICD-10-CM

## 2024-06-27 LAB
JAK2 EXON 12 MUTATION DETECTION BLOOD: NORMAL
PATH REPORT.FINAL DX SPEC: NORMAL

## 2024-06-27 PROCEDURE — 71260 CT THORAX DX C+: CPT | Mod: TC

## 2024-06-27 PROCEDURE — 71260 CT THORAX DX C+: CPT | Mod: 26,,, | Performed by: RADIOLOGY

## 2024-06-27 PROCEDURE — 25500020 PHARM REV CODE 255: Performed by: NURSE PRACTITIONER

## 2024-06-27 RX ADMIN — IOHEXOL 75 ML: 350 INJECTION, SOLUTION INTRAVENOUS at 02:06

## 2024-06-28 ENCOUNTER — PATIENT MESSAGE (OUTPATIENT)
Dept: HEMATOLOGY/ONCOLOGY | Facility: CLINIC | Age: 44
End: 2024-06-28
Payer: MEDICAID

## 2024-06-28 PROBLEM — F33.0 MAJOR DEPRESSIVE DISORDER, RECURRENT EPISODE, MILD WITH ANXIOUS DISTRESS: Status: ACTIVE | Noted: 2024-06-28

## 2024-06-28 PROBLEM — F32.0 MAJOR DEPRESSIVE DISORDER, SINGLE EPISODE, MILD: Status: ACTIVE | Noted: 2024-06-28

## 2024-06-28 PROBLEM — F32.0 MAJOR DEPRESSIVE DISORDER, SINGLE EPISODE, MILD: Status: RESOLVED | Noted: 2024-06-28 | Resolved: 2024-06-28

## 2024-06-28 LAB
MPNR  FINAL DIAGNOSIS: NORMAL
MPNR  SPECIMEN TYPE: NORMAL
MPNR RESULT: NORMAL

## 2024-06-28 NOTE — ASSESSMENT & PLAN NOTE
Targeted psychotherapy to learn coping skills and get extra support as you deal with your current stressors is appropriate at this time. We particularly recommend skills-based Cognitive Behavioral Therapy (CBT) as it has a strong evidence base for treatment of depression and anxiety in particular.  For management of stress/anxiety, we recommend daily practice of relaxation strategies (e.g., deep breathing, progressive muscle relaxation, mindfulness), the following are just a few good examples:   The smartphone gerry [id number] can help guide you through a deep breathing exercise that may help with anxiety.   There are also excellent free videos for guided meditation, muscle relaxation, and mindfulness on [employer] or other video platforms.   We recommend trying some and finding something that works. For any of these skills, they only work if you practice them regularly.  We recommend first choosing one skill and practicing it ten minutes a day when you do not feel anxious or distressed. Then you can use these skills when you need them.

## 2024-07-05 ENCOUNTER — PATIENT MESSAGE (OUTPATIENT)
Dept: CARDIOLOGY | Facility: CLINIC | Age: 44
End: 2024-07-05
Payer: MEDICAID

## 2024-07-24 ENCOUNTER — OFFICE VISIT (OUTPATIENT)
Dept: NEUROLOGY | Facility: CLINIC | Age: 44
End: 2024-07-24
Payer: MEDICAID

## 2024-07-24 DIAGNOSIS — F33.0 MAJOR DEPRESSIVE DISORDER, RECURRENT EPISODE, MILD WITH ANXIOUS DISTRESS: Primary | ICD-10-CM

## 2024-07-24 DIAGNOSIS — G47.00 INSOMNIA, UNSPECIFIED TYPE: ICD-10-CM

## 2024-07-24 DIAGNOSIS — R41.9 COGNITIVE COMPLAINTS: ICD-10-CM

## 2024-07-24 DIAGNOSIS — R41.3 MEMORY LOSS DUE TO MEDICAL CONDITION: ICD-10-CM

## 2024-07-24 PROCEDURE — 99211 OFF/OP EST MAY X REQ PHY/QHP: CPT | Mod: PBBFAC

## 2024-07-24 PROCEDURE — 99999 PR PBB SHADOW E&M-EST. PATIENT-LVL I: CPT | Mod: PBBFAC,,,

## 2024-07-24 NOTE — PROGRESS NOTES
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     Relationship Status:   : Yes, 19 years   Children: 3 (ages 19, 20, 22)    MENTAL STATUS AND OBSERVATIONS:  APPEARANCE: Casually dressed and adequate grooming/hygiene.   ALERTNESS/ORIENTATION: Attentive and alert.   GAIT/MOTOR: Motor movements were WNL upon casual observation; gait was not assessed  SPEECH/LANGUAGE: Normal in rate, rhythm, tone, and volume. Expressive and receptive language were grossly intact.  STATED MOOD/AFFECT: Mood was anxious. Affect was full  INTERPERSONAL BEHAVIOR: Rapport was quickly and easily established   THOUGHT PROCESSES: Thoughts seemed logical and goal directed.   Insight & Judgment:  TESTING OBSERVATIONS: He understood and retained task instructions. He completed tasks at a normal pace. At the beginning of testing Ms. Moreno's performance on a measure of test engagement was below cut-off, therefore, Dr. Meza came and had a brief discussion with him about providing full effort. Dr. Meza noted that Mr. Moreno appeared anxious and needed some support in normalizing his anxiety around testing. As testing went on Mr. Moreno appeared to acclimate but measures of engagement continued to be borderline and anxious behavior continued to be  observed.     PROCEDURES/TESTS ADMINISTERED: Performed a review of pertinent medical records, reviewed limits to confidentiality, conducted a clinical interview, and explained procedures.    RESULTS     Tests Administered (Manual norms used unless otherwise indicated): [employer] - Test of Premorbid Functioning (TOPF), TOMM, Green Word Memory Test (WMT), Wechsler Adult Intelligence Scale 4th Ed. (WAIS-IV) select subtests (Digit Span, Arithmetic, Coding, Vocabulary, Similarities, Matrix Reasoning, and Block Design), Controlled Oral Word Association Test (COWAT; [facility] norms), Semantic Fluency (Animals; [facility] norms), Neuropsychological Assessment Battery (NAB) Naming, [place] Verbal Learning Test, 3rd Ed. (CVLT-3), Wechsler Memory Scale, 4th Ed. Logical Memory (WMS IV-LM), Brief Visuospatial Memory Test, Revised (BVMT-R), Leo-Osterrieth Complex Figure Test (RCFT) Copy trial, Trail Making Test (TMT A/B; [facility] norms), Grooved Pegboard Test ([facility] norms), Sam Depression Inventory, 2nd Ed. (BDI-2); and Sam Anxiety Inventory (DANE).    Performance Validity: Mr. Moreno completed both stand-alone and embedded measures of task engagement. Below-cutoff performance on any one stand-alone measure and/ or any two embedded measures of task engagement is highly unlikely to occur outside the context of poor or inconsistent effort during testing. Scores on stand-alone and embedded performance validity measures were variable, with one trial of a stand-alone measure being below cutoff, and another stand-alone and an embedded measure being borderline. There were two embedded measures that were above cutoff. The current results, therefore, may underestimate his current functioning. Scores that are within or above normal limits can be interpreted to reflect at least minimally adequate functioning in relevant domains; however, scores cannot be interpreted that fall below normal limits as they cannot be determined to  reflect underlying impairment.     Due to the above validity concerns, a table of cognitive test scores is not provided as it may inaccurately characterize cognitive functioning. Test data may be made available to a qualified provider upon request.    Mood: Mr. Moreno completed screening measures of depression and anxiety. His endorsements on these measures were suggestive of a mild level of anxiety and severe level of depression symptoms. He did not endorse thoughts of self-harm.       Billing Documentation     Time spent conducting via tele-health (audio and video) interview with the patient: 60 minutes; billed separately.  Time on review of neuropsychological test data and relevant records, data interpretation, providing feedback about these results, and writing/ documentation: 217 minutes; [id number] &[id number] (x3).  Psychometrist time spent in the administration and scoring of 2 or more neuropsychological tests 303 minutes; [id number] & [id number] (x9).

## 2024-07-25 ENCOUNTER — PATIENT MESSAGE (OUTPATIENT)
Dept: FAMILY MEDICINE | Facility: CLINIC | Age: 44
End: 2024-07-25
Payer: MEDICAID

## 2024-07-25 DIAGNOSIS — U07.1 ACUTE COVID-19: Primary | ICD-10-CM

## 2024-07-26 ENCOUNTER — PATIENT MESSAGE (OUTPATIENT)
Dept: HEMATOLOGY/ONCOLOGY | Facility: CLINIC | Age: 44
End: 2024-07-26
Payer: MEDICAID

## 2024-07-26 RX ORDER — NIRMATRELVIR AND RITONAVIR 300-100 MG
KIT ORAL
Qty: 30 TABLET | Refills: 0 | Status: SHIPPED | OUTPATIENT
Start: 2024-07-26 | End: 2024-07-31

## 2024-07-31 PROBLEM — G47.00 INSOMNIA: Status: ACTIVE | Noted: 2024-07-31

## 2024-08-01 ENCOUNTER — OFFICE VISIT (OUTPATIENT)
Dept: NEUROLOGY | Facility: CLINIC | Age: 44
End: 2024-08-01
Payer: MEDICAID

## 2024-08-01 DIAGNOSIS — F33.0 MAJOR DEPRESSIVE DISORDER, RECURRENT EPISODE, MILD WITH ANXIOUS DISTRESS: ICD-10-CM

## 2024-08-01 DIAGNOSIS — R41.9 COGNITIVE COMPLAINTS: Primary | ICD-10-CM

## 2024-08-01 NOTE — PROGRESS NOTES
Mr. Moreno and his wife attended a tele-health (audio and video) feedback session to discuss the results from his recent neuropsychological testing (see report dated 7/24/2024). We discussed his test results, diagnoses, and our recommendations. Mr. Moreno and his wife voiced their understanding of the information provided, and voiced their intention to follow through on the recommendations provided. All questions were answered to their satisfaction and Mr. Moreno was provided with a copy of his report. He was encouraged to contact Dr. Meza's office with any future questions or concerns.     Nina Morales Psy.D.  Postdoctoral Fellow in Clinical Neuropsychology  [facility] - Department of Neurology    Jerzy Meza, Ph.D.  Certified Clinical Psychologist  [facility] - Department of Neurology

## 2024-08-02 ENCOUNTER — PATIENT MESSAGE (OUTPATIENT)
Dept: NEUROLOGY | Facility: CLINIC | Age: 44
End: 2024-08-02
Payer: MEDICAID

## 2024-08-02 ENCOUNTER — PATIENT MESSAGE (OUTPATIENT)
Dept: CARDIOLOGY | Facility: CLINIC | Age: 44
End: 2024-08-02
Payer: MEDICAID

## 2024-08-06 DIAGNOSIS — R07.89 OTHER CHEST PAIN: ICD-10-CM

## 2024-08-07 RX ORDER — ASPIRIN 81 MG/1
81 TABLET ORAL DAILY
Qty: 90 TABLET | Refills: 1 | Status: SHIPPED | OUTPATIENT
Start: 2024-08-07 | End: 2025-08-07

## 2024-08-21 ENCOUNTER — PATIENT MESSAGE (OUTPATIENT)
Dept: FAMILY MEDICINE | Facility: CLINIC | Age: 44
End: 2024-08-21
Payer: MEDICAID

## 2024-08-21 ENCOUNTER — TELEPHONE (OUTPATIENT)
Dept: PSYCHIATRY | Facility: CLINIC | Age: 44
End: 2024-08-21
Payer: MEDICAID

## 2024-08-21 NOTE — TELEPHONE ENCOUNTER
Asking for another letter regarding his health condition and that he cannot travel to go to court

## 2024-08-21 NOTE — TELEPHONE ENCOUNTER
----- Message from Radha Bose MA sent at 8/21/2024 12:04 PM CDT -----  Type:  Sooner Appointment Request    Patient is requesting a sooner appointment.  Patient declined first available appointment listed as well as another facility and provider .  Patient will not accept being placed on the waitlist and is requesting a message be sent to doctor.    Name of Caller:  Pt   When is the first available appointment?  Dec for [place]   Symptoms:  Stress , panic anxiety   Would the patient rather a call back or a response via [facility]?  Both   Best Call Back Number:  Telephone Information:  Mobile          [phone]     Additional Information:

## 2024-08-21 NOTE — TELEPHONE ENCOUNTER
I am going to have to talk to Dr. Meza about this to see if he recommends such a recommendation.  We may not be able to avoid this long-term

## 2024-08-22 NOTE — TELEPHONE ENCOUNTER
I am still waiting to hear from Dr. Meza, but he needs to get supporting statement from all specialists that he has seen saying that he can not go to the court.  This will be a combined decision as all physicians needs to agree on it

## 2024-08-23 ENCOUNTER — PATIENT MESSAGE (OUTPATIENT)
Dept: FAMILY MEDICINE | Facility: CLINIC | Age: 44
End: 2024-08-23
Payer: MEDICAID

## 2024-08-23 ENCOUNTER — PATIENT MESSAGE (OUTPATIENT)
Dept: HEMATOLOGY/ONCOLOGY | Facility: CLINIC | Age: 44
End: 2024-08-23
Payer: MEDICAID

## 2024-08-23 ENCOUNTER — TELEPHONE (OUTPATIENT)
Dept: FAMILY MEDICINE | Facility: CLINIC | Age: 44
End: 2024-08-23
Payer: MEDICAID

## 2024-08-23 ENCOUNTER — PATIENT MESSAGE (OUTPATIENT)
Dept: PULMONOLOGY | Facility: CLINIC | Age: 44
End: 2024-08-23
Payer: MEDICAID

## 2024-08-23 ENCOUNTER — DOCUMENTATION ONLY (OUTPATIENT)
Dept: HEMATOLOGY/ONCOLOGY | Facility: CLINIC | Age: 44
End: 2024-08-23
Payer: MEDICAID

## 2024-08-23 ENCOUNTER — PATIENT MESSAGE (OUTPATIENT)
Dept: CARDIOLOGY | Facility: CLINIC | Age: 44
End: 2024-08-23
Payer: MEDICAID

## 2024-08-23 NOTE — PROGRESS NOTES
RO composed requested letter for pt. RO provided MA with letter for provider to sign. SW will remain available.

## 2024-08-23 NOTE — TELEPHONE ENCOUNTER
Please let patient know that I have actually talked to Dr. Meza also about this and at this time we can not continue to provide statements regarding his continued absence from the trial.  There is just not enough evidence to support that.

## 2024-09-04 DIAGNOSIS — R07.9 CHEST PAIN, UNSPECIFIED TYPE: Primary | ICD-10-CM

## 2024-09-04 RX ORDER — NITROGLYCERIN 0.4 MG/1
0.4 TABLET SUBLINGUAL EVERY 5 MIN PRN
Qty: 30 TABLET | Refills: 1 | Status: SHIPPED | OUTPATIENT
Start: 2024-09-04 | End: 2025-09-04

## 2024-09-13 ENCOUNTER — LAB VISIT (OUTPATIENT)
Dept: LAB | Facility: HOSPITAL | Age: 44
End: 2024-09-13
Attending: NURSE PRACTITIONER
Payer: MEDICAID

## 2024-09-13 ENCOUNTER — OFFICE VISIT (OUTPATIENT)
Dept: HEMATOLOGY/ONCOLOGY | Facility: CLINIC | Age: 44
End: 2024-09-13
Payer: MEDICAID

## 2024-09-13 VITALS
OXYGEN SATURATION: 99 % | HEIGHT: 75 IN | WEIGHT: 306.25 LBS | BODY MASS INDEX: 38.08 KG/M2 | SYSTOLIC BLOOD PRESSURE: 137 MMHG | HEART RATE: 68 BPM | DIASTOLIC BLOOD PRESSURE: 88 MMHG | TEMPERATURE: 98 F

## 2024-09-13 DIAGNOSIS — G47.09 OTHER INSOMNIA: ICD-10-CM

## 2024-09-13 DIAGNOSIS — F32.A DEPRESSION, UNSPECIFIED DEPRESSION TYPE: ICD-10-CM

## 2024-09-13 DIAGNOSIS — R68.89 FORGETFULNESS: ICD-10-CM

## 2024-09-13 DIAGNOSIS — D75.89 MACROCYTOSIS: ICD-10-CM

## 2024-09-13 DIAGNOSIS — D75.1 POLYCYTHEMIA: ICD-10-CM

## 2024-09-13 DIAGNOSIS — D75.1 SECONDARY POLYCYTHEMIA: ICD-10-CM

## 2024-09-13 DIAGNOSIS — E53.8 FOLIC ACID DEFICIENCY: Primary | ICD-10-CM

## 2024-09-13 DIAGNOSIS — G47.33 OSA ON CPAP: ICD-10-CM

## 2024-09-13 LAB
BASOPHILS # BLD AUTO: 0.07 K/UL (ref 0–0.2)
BASOPHILS NFR BLD: 0.7 % (ref 0–1.9)
DIFFERENTIAL METHOD BLD: ABNORMAL
EOSINOPHIL # BLD AUTO: 0.4 K/UL (ref 0–0.5)
EOSINOPHIL NFR BLD: 4.1 % (ref 0–8)
ERYTHROCYTE [DISTWIDTH] IN BLOOD BY AUTOMATED COUNT: 13.2 % (ref 11.5–14.5)
HCT VFR BLD AUTO: 50.8 % (ref 40–54)
HGB BLD-MCNC: 17.4 G/DL (ref 14–18)
IMM GRANULOCYTES # BLD AUTO: 0.06 K/UL (ref 0–0.04)
IMM GRANULOCYTES NFR BLD AUTO: 0.6 % (ref 0–0.5)
LYMPHOCYTES # BLD AUTO: 2.7 K/UL (ref 1–4.8)
LYMPHOCYTES NFR BLD: 25.9 % (ref 18–48)
MCH RBC QN AUTO: 33.9 PG (ref 27–31)
MCHC RBC AUTO-ENTMCNC: 34.3 G/DL (ref 32–36)
MCV RBC AUTO: 99 FL (ref 82–98)
MONOCYTES # BLD AUTO: 0.9 K/UL (ref 0.3–1)
MONOCYTES NFR BLD: 8.6 % (ref 4–15)
NEUTROPHILS # BLD AUTO: 6.2 K/UL (ref 1.8–7.7)
NEUTROPHILS NFR BLD: 60.1 % (ref 38–73)
NRBC BLD-RTO: 0 /100 WBC
PLATELET # BLD AUTO: 173 K/UL (ref 150–450)
PMV BLD AUTO: 10.4 FL (ref 9.2–12.9)
RBC # BLD AUTO: 5.14 M/UL (ref 4.6–6.2)
WBC # BLD AUTO: 10.27 K/UL (ref 3.9–12.7)

## 2024-09-13 PROCEDURE — 36415 COLL VENOUS BLD VENIPUNCTURE: CPT | Performed by: NURSE PRACTITIONER

## 2024-09-13 PROCEDURE — 85025 COMPLETE CBC W/AUTO DIFF WBC: CPT | Performed by: NURSE PRACTITIONER

## 2024-09-13 PROCEDURE — 99214 OFFICE O/P EST MOD 30 MIN: CPT | Mod: PBBFAC | Performed by: NURSE PRACTITIONER

## 2024-09-13 PROCEDURE — 99999 PR PBB SHADOW E&M-EST. PATIENT-LVL IV: CPT | Mod: PBBFAC,,, | Performed by: NURSE PRACTITIONER

## 2024-09-13 RX ORDER — FOLIC ACID 0.4 MG
400 TABLET ORAL DAILY
Qty: 90 TABLET | Refills: 2 | Status: SHIPPED | OUTPATIENT
Start: 2024-09-13 | End: 2025-09-13

## 2024-09-13 NOTE — PROGRESS NOTES
Subjective:      Patient ID: Kendrick Moreno is a 44 y.o. male.    Chief Complaint: forgetfulness    HPI:   45 yo male presents to the hematology clinic as a new patient consulted for further evaluation and treatment of polycythemia.  He has been referred to the clinic by his cardiologist, Dr. Lopez.      Used to smoke cigarettes - 1 pack/day for 15 years.  Quit about 4 years ago.  Now occasional vapes a couple of times/week.  Denies any illicit drug use.  Has social alcohol on occasion.       Denies itching or excessive headaches.  Denies previous history of blood clots.     Family hx of blood disorders:  None known.     Family hx of cancer:  Maternal aunt: lung cancer - smoker  Maternal aunt: lung cancer- smoker  Maternal uncle: lung cancer - smoker  Maternal uncle: pancreatic cancer     Has not had baseline psa testing.  Found with moderate position obstructive sleep apnea. Therapy with CPAP indicated.   States that he is using his cpap about 2-6 hours a night since sleep study in 2/2023.     Has gained some weight over the last several years - up to 301, but now is losing weight and is down to 280's intentionally.     Denies use of testosterone or supplements.  Denies frequent high altitude travel.       Interval History:   9/13/2024 States that he has been using his cpap consistently nightly - averaging 4-6 hours per night use. Recently found with folate deficiency - workup was done due to memory issues.  Has not started folic acid supplement as of yet. With macrocytosis noted.  Hct wnl.  No phlebotomy needed currently.    I have reviewed all of the patient's relevant lab work available in the medical record and have utilized this in my evaluation and management recommendations today.      Social History     Socioeconomic History    Marital status:    Tobacco Use    Smoking status: Light Smoker     Current packs/day: 0.25     Average packs/day: 0.3 packs/day for 1 year (0.3 ttl pk-yrs)     Types: Cigars,  Cigarettes    Smokeless tobacco: Never    Tobacco comments:     cigars   Substance and Sexual Activity    Alcohol use: Yes    Drug use: No    Sexual activity: Yes     Partners: Female     Birth control/protection: None     Social Determinants of Health     Financial Resource Strain: Low Risk  (12/18/2023)    Overall Financial Resource Strain (CARDIA)     Difficulty of Paying Living Expenses: Not very hard   Food Insecurity: No Food Insecurity (12/18/2023)    Hunger Vital Sign     Worried About Running Out of Food in the Last Year: Never true     Ran Out of Food in the Last Year: Never true   Transportation Needs: No Transportation Needs (12/18/2023)    PRAPARE - Transportation     Lack of Transportation (Medical): No     Lack of Transportation (Non-Medical): No   Physical Activity: Insufficiently Active (12/18/2023)    Exercise Vital Sign     Days of Exercise per Week: 2 days     Minutes of Exercise per Session: 20 min   Stress: Stress Concern Present (12/18/2023)    [employer] - Occupational Stress Questionnaire     Feeling of Stress : To some extent   Housing Stability: Low Risk  (12/18/2023)    Housing Stability Vital Sign     Unable to Pay for Housing in the Last Year: No     Number of Places Lived in the Last Year: 1     Unstable Housing in the Last Year: No       No family history on file.    History reviewed. No pertinent surgical history.    History reviewed. No pertinent past medical history.    Review of Systems   Constitutional:  Negative for appetite change and unexpected weight change.   HENT:  Negative for mouth sores.    Eyes:  Negative for visual disturbance.   Respiratory:  Negative for cough and shortness of breath.    Cardiovascular:  Negative for chest pain.   Gastrointestinal:  Negative for abdominal pain and diarrhea.   Endocrine: Negative.    Genitourinary:  Negative for frequency.   Musculoskeletal:  Negative for back pain.   Skin:  Negative for rash.    Allergic/Immunologic: Negative.    Neurological:  Negative for headaches.   Hematological:  Negative for adenopathy.   Psychiatric/Behavioral:  Positive for dysphoric mood and sleep disturbance. The patient is not nervous/anxious.         Forgetfulness          Medication List with Changes/Refills   New Medications    FOLIC ACID (FOLVITE) 400 MCG TABLET    Take 1 tablet (400 mcg total) by mouth once daily.   Current Medications    ASPIRIN (ECOTRIN) 81 MG EC TABLET    Take 1 tablet (81 mg total) by mouth once daily.    ATORVASTATIN (LIPITOR) 20 MG TABLET    Take 1 tablet (20 mg total) by mouth every evening. Changing Pravastatin to Lipitor due to insurance    CARVEDILOL (COREG) 25 MG TABLET    Take 1 tablet (25 mg total) by mouth 2 (two) times daily with meals.    CITALOPRAM (CELEXA) 20 MG TABLET    Take 1 tablet (20 mg total) by mouth once daily.    CLONIDINE (CATAPRES) 0.1 MG TABLET    Take 1 tablet (0.1 mg total) by mouth every 6 (six) hours as needed (for BP > 180/90).    HYDRALAZINE (APRESOLINE) 100 MG TABLET    Take 1 tablet (100 mg total) by mouth 3 (three) times daily.    ISOSORBIDE MONONITRATE (IMDUR) 60 MG 24 HR TABLET    Take 1 tablet (60 mg total) by mouth every evening.    NIFEDIPINE (PROCARDIA-XL) 90 MG (OSM) 24 HR TABLET    Take 1 tablet (90 mg total) by mouth every morning.    NITROGLYCERIN (NITROSTAT) 0.4 MG SL TABLET    Place 1 tablet (0.4 mg total) under the tongue every 5 (five) minutes as needed for Chest pain. After 3 dose need to call EMS    ONDANSETRON (ZOFRAN) 4 MG TABLET    Take 1 tablet (4 mg total) by mouth every 6 (six) hours as needed for Nausea.    OXYCODONE-ACETAMINOPHEN (PERCOCET)  MG PER TABLET    Take 1 tablet by mouth every 8 (eight) hours as needed for Pain.    TAMSULOSIN (FLOMAX) 0.4 MG CAP    Take 1 capsule (0.4 mg total) by mouth once daily.    VALSARTAN-HYDROCHLOROTHIAZIDE (DIOVAN-HCT) 320-25 MG PER TABLET    Take 1 tablet by mouth once daily.        Objective:      Vitals:    09/13/24 1301   BP: 137/88   Pulse: 68   Temp: 98.2 °F (36.8 °C)       Physical Exam  Vitals reviewed.   Constitutional:       Appearance: Normal appearance. He is obese.   HENT:      Head: Normocephalic and atraumatic.      Right Ear: External ear normal.      Left Ear: External ear normal.   Cardiovascular:      Rate and Rhythm: Normal rate and regular rhythm.      Heart sounds: Normal heart sounds, S1 normal and S2 normal.   Pulmonary:      Effort: Pulmonary effort is normal.      Breath sounds: Normal breath sounds.   Abdominal:      General: There is no distension.   Musculoskeletal:         General: Normal range of motion.      Cervical back: Normal range of motion.   Skin:     General: Skin is warm and dry.   Neurological:      General: No focal deficit present.      Mental Status: He is alert and oriented to person, place, and time.   Psychiatric:         Attention and Perception: Attention and perception normal.         Mood and Affect: Mood and affect normal.         Speech: Speech normal.         Behavior: Behavior normal. Behavior is cooperative.         Thought Content: Thought content normal.         Cognition and Memory: Cognition and memory normal.         Judgment: Judgment normal.         Assessment:     Problem List Items Addressed This Visit          Other    ENRIQUE on CPAP    Relevant Orders    HEMOGLOBIN A1C    Calcitriol    Insomnia    Relevant Orders    Folate     Other Visit Diagnoses       Folic acid deficiency    -  Primary    Relevant Medications    folic acid (FOLVITE) 400 MCG tablet    Other Relevant Orders    IGA    TISSUE TRANSGLUTAMINASE, IGA    CBC Auto Differential    Comprehensive Metabolic Panel    Folate    Macrocytosis        Relevant Medications    folic acid (FOLVITE) 400 MCG tablet    Other Relevant Orders    CBC Auto Differential    Folate    Forgetfulness        Relevant Medications    folic acid (FOLVITE) 400 MCG tablet    Secondary polycythemia         "Relevant Orders    Iron and TIBC    Ferritin    HEMOGLOBIN A1C    Calcitriol    CBC Auto Differential    Depression, unspecified depression type        Relevant Orders    Calcitriol    Folate            Lab Results   Component Value Date    WBC 10.27 09/13/2024    RBC 5.14 09/13/2024    HGB 17.4 09/13/2024    HCT 50.8 09/13/2024    MCV 99 (H) 09/13/2024    MCH 33.9 (H) 09/13/2024    MCHC 34.3 09/13/2024    RDW 13.2 09/13/2024     09/13/2024    MPV 10.4 09/13/2024    GRAN 6.2 09/13/2024    GRAN 60.1 09/13/2024    LYMPH 2.7 09/13/2024    LYMPH 25.9 09/13/2024    MONO 0.9 09/13/2024    MONO 8.6 09/13/2024    EOS 0.4 09/13/2024    BASO 0.07 09/13/2024    EOSINOPHIL 4.1 09/13/2024    BASOPHIL 0.7 09/13/2024      Lab Results   Component Value Date     06/04/2024    K 3.9 06/04/2024     06/04/2024    CO2 21 (L) 06/04/2024    BUN 12 06/04/2024    CREATININE 0.8 06/04/2024    CALCIUM 10.0 06/04/2024    ANIONGAP 11 06/04/2024    ESTGFRAFRICA >60 11/16/2016    EGFRNONAA >60 11/16/2016     Lab Results   Component Value Date    ALT 29 06/04/2024    AST 19 06/04/2024    ALKPHOS 74 06/04/2024    BILITOT 1.0 06/04/2024     No results found for: "IRON", "TRANSFERRIN", "TIBC", "FESATURATED", "FERRITIN"   Lab Results   Component Value Date    FOLATE 2.4 (L) 08/08/2024     Mpn reflexive and non exon prerna 2 studies normal  Erythropoietin normal  Plan:   Folic acid deficiency  -     folic acid (FOLVITE) 400 MCG tablet; Take 1 tablet (400 mcg total) by mouth once daily.  Dispense: 90 tablet; Refill: 2  -     IGA; Future; Expected date: 09/13/2024  -     TISSUE TRANSGLUTAMINASE, IGA; Future; Expected date: 09/13/2024  -     CBC Auto Differential; Future; Expected date: 09/13/2024  -     Comprehensive Metabolic Panel; Future; Expected date: 09/13/2024  -     Folate; Future; Expected date: 09/13/2024    Macrocytosis  -     folic acid (FOLVITE) 400 MCG tablet; Take 1 tablet (400 mcg total) by mouth once daily.  Dispense: 90 " tablet; Refill: 2  -     CBC Auto Differential; Future; Expected date: 09/13/2024  -     Folate; Future; Expected date: 09/13/2024    Forgetfulness  -     folic acid (FOLVITE) 400 MCG tablet; Take 1 tablet (400 mcg total) by mouth once daily.  Dispense: 90 tablet; Refill: 2    Secondary polycythemia  -     Iron and TIBC; Future; Expected date: 09/13/2024  -     Ferritin; Future; Expected date: 09/13/2024  -     HEMOGLOBIN A1C; Future; Expected date: 09/13/2024  -     Calcitriol; Future; Expected date: 09/13/2024  -     CBC Auto Differential; Future; Expected date: 09/13/2024    ENRIQUE on CPAP  -     HEMOGLOBIN A1C; Future; Expected date: 09/13/2024  -     Calcitriol; Future; Expected date: 09/13/2024    Other insomnia  -     Folate; Future; Expected date: 09/13/2024    Depression, unspecified depression type  -     Calcitriol; Future; Expected date: 09/13/2024  -     Folate; Future; Expected date: 09/13/2024      Med Onc Chart Routing      Follow up with physician    Follow up with [facility] 3 months. labs prior at [facility]   Infusion scheduling note   possible phlebotomy for hct >55%   Injection scheduling note n/a   Labs   Scheduling:  Preferred lab:  Lab interval:  Labs today IGA and tissue transglutaminase IGA, iron studies, calcitrol, hemoglobin A1C.  Labs prior to next visit cbc, cmp, folate   Imaging   N/a   Pharmacy appointment No pharmacy appointment needed      Other referrals       No additional referrals needed  n/a          Start Folvite 400 mcg PO daily.  Labs today calcitrol, iron studies, hemoglobin A1C, IgA, tissue IgA.  Labs prior to next visit cbc, cmp, folate.  Possible phlebotomy for hct > 55%.     Total time spent on encounter: 45 minutes    Collaborating Provider:  Dr. Twin Hernandez    Thank You,  SLIME Metz-JESSIKA  Benign Hematology

## 2024-09-16 ENCOUNTER — OFFICE VISIT (OUTPATIENT)
Dept: PULMONOLOGY | Facility: CLINIC | Age: 44
End: 2024-09-16
Payer: MEDICAID

## 2024-09-16 VITALS
RESPIRATION RATE: 17 BRPM | DIASTOLIC BLOOD PRESSURE: 88 MMHG | SYSTOLIC BLOOD PRESSURE: 130 MMHG | HEIGHT: 75 IN | HEART RATE: 65 BPM | BODY MASS INDEX: 38.32 KG/M2 | OXYGEN SATURATION: 98 % | WEIGHT: 308.19 LBS

## 2024-09-16 DIAGNOSIS — Z87.891 FORMER SMOKER: ICD-10-CM

## 2024-09-16 DIAGNOSIS — R06.02 SOBOE (SHORTNESS OF BREATH ON EXERTION): Primary | ICD-10-CM

## 2024-09-16 DIAGNOSIS — G47.19 EXCESSIVE DAYTIME SLEEPINESS: ICD-10-CM

## 2024-09-16 DIAGNOSIS — F51.04 CHRONIC INSOMNIA: ICD-10-CM

## 2024-09-16 DIAGNOSIS — F32.A ANXIETY AND DEPRESSION: ICD-10-CM

## 2024-09-16 DIAGNOSIS — D75.1 POLYCYTHEMIA: ICD-10-CM

## 2024-09-16 DIAGNOSIS — F41.9 ANXIETY AND DEPRESSION: ICD-10-CM

## 2024-09-16 DIAGNOSIS — G47.33 OSA ON CPAP: ICD-10-CM

## 2024-09-16 PROCEDURE — 3079F DIAST BP 80-89 MM HG: CPT | Mod: CPTII,,, | Performed by: INTERNAL MEDICINE

## 2024-09-16 PROCEDURE — 3044F HG A1C LEVEL LT 7.0%: CPT | Mod: CPTII,,, | Performed by: INTERNAL MEDICINE

## 2024-09-16 PROCEDURE — 3075F SYST BP GE 130 - 139MM HG: CPT | Mod: CPTII,,, | Performed by: INTERNAL MEDICINE

## 2024-09-16 PROCEDURE — 1160F RVW MEDS BY RX/DR IN RCRD: CPT | Mod: CPTII,,, | Performed by: INTERNAL MEDICINE

## 2024-09-16 PROCEDURE — 99214 OFFICE O/P EST MOD 30 MIN: CPT | Mod: PBBFAC | Performed by: INTERNAL MEDICINE

## 2024-09-16 PROCEDURE — 1159F MED LIST DOCD IN RCRD: CPT | Mod: CPTII,,, | Performed by: INTERNAL MEDICINE

## 2024-09-16 PROCEDURE — 99214 OFFICE O/P EST MOD 30 MIN: CPT | Mod: S$PBB,,, | Performed by: INTERNAL MEDICINE

## 2024-09-16 PROCEDURE — 3008F BODY MASS INDEX DOCD: CPT | Mod: CPTII,,, | Performed by: INTERNAL MEDICINE

## 2024-09-16 PROCEDURE — 99999 PR PBB SHADOW E&M-EST. PATIENT-LVL IV: CPT | Mod: PBBFAC,,, | Performed by: INTERNAL MEDICINE

## 2024-09-16 NOTE — PROGRESS NOTES
Pulmonary Outpatient Follow Up Visit     Subjective:       Patient ID: Kendrick Moreno is a 44 y.o. male.    Chief Complaint: Sleep Apnea      HPI        44-year-old male patient with known past medical history of obstructive sleep apnea CMS AHI of 8 AASM RDI of RDI of 21 alicia O2 sat of 85% and mean O2 sat of 94%.    [place] Sleepiness Scale score 14.  Insomnia severity index 22.  PHQ 9 questionnaire score 13 and VANESSA-7 anxiety score 14.      Bedtime 11 sleep onset 12:00 p.m..  Rise time 5:00 a.m..    Denies predisposing precipitating factors of his insomnia.    Former smoker.      He underwent an evaluation for secondary polycythemia hemoglobin of 18.  Josue negative.    Review of Systems   Respiratory:  Positive for apnea, snoring, shortness of breath, dyspnea on extertion and somnolence.    Psychiatric/Behavioral:  Positive for sleep disturbance.        Outpatient Encounter Medications as of 9/16/2024   Medication Sig Dispense Refill    aspirin (ECOTRIN) 81 MG EC tablet Take 1 tablet (81 mg total) by mouth once daily. 90 tablet 1    atorvastatin (LIPITOR) 20 MG tablet Take 1 tablet (20 mg total) by mouth every evening. Changing Pravastatin to Lipitor due to insurance 90 tablet 1    carvediloL (COREG) 25 MG tablet Take 1 tablet (25 mg total) by mouth 2 (two) times daily with meals. 180 tablet 1    citalopram (CELEXA) 20 MG tablet Take 1 tablet (20 mg total) by mouth once daily. 90 tablet 3    cloNIDine (CATAPRES) 0.1 MG tablet Take 1 tablet (0.1 mg total) by mouth every 6 (six) hours as needed (for BP > 180/90). 180 tablet 1    folic acid (FOLVITE) 400 MCG tablet Take 1 tablet (400 mcg total) by mouth once daily. 90 tablet 2    hydrALAZINE (APRESOLINE) 100 MG tablet Take 1 tablet (100 mg total) by mouth 3 (three) times daily. 270 tablet 1    isosorbide mononitrate (IMDUR) 60 MG 24 hr tablet Take 1 tablet (60 mg total) by mouth every evening. 30 tablet 3    NIFEdipine  "(PROCARDIA-XL) 90 MG (OSM) 24 hr tablet Take 1 tablet (90 mg total) by mouth every morning. 90 tablet 1    nitroGLYCERIN (NITROSTAT) 0.4 MG SL tablet Place 1 tablet (0.4 mg total) under the tongue every 5 (five) minutes as needed for Chest pain. After 3 dose need to call EMS 30 tablet 1    ondansetron (ZOFRAN) 4 MG tablet Take 1 tablet (4 mg total) by mouth every 6 (six) hours as needed for Nausea. 12 tablet 0    oxyCODONE-acetaminophen (PERCOCET)  mg per tablet Take 1 tablet by mouth every 8 (eight) hours as needed for Pain. 12 tablet 0    tamsulosin (FLOMAX) 0.4 mg Cap Take 1 capsule (0.4 mg total) by mouth once daily. (Patient not taking: Reported on 5/2/2024) 30 capsule 0    valsartan-hydrochlorothiazide (DIOVAN-HCT) 320-25 mg per tablet Take 1 tablet by mouth once daily. 90 tablet 1    [DISCONTINUED] nitroGLYCERIN (NITROSTAT) 0.4 MG SL tablet Place 1 tablet (0.4 mg total) under the tongue every 5 (five) minutes as needed for Chest pain. After 3 dose need to call EMS 30 tablet 1     No facility-administered encounter medications on file as of 9/16/2024.       Objective:     Vital Signs (Most Recent)  Vital Signs  Pulse: 65  Resp: 17  SpO2: 98 %  BP: 130/88  Height and Weight  Height: 6' 3" (190.5 cm)  Weight: (!) 139.8 kg (308 lb 3.3 oz)  BSA (Calculated - sq m): 2.72 sq meters  BMI (Calculated): 38.5  Weight in (lb) to have BMI = 25: 199.6]  Wt Readings from Last 2 Encounters:   09/16/24 (!) 139.8 kg (308 lb 3.3 oz)   09/13/24 (!) 138.9 kg (306 lb 3.5 oz)       Physical Exam   Constitutional: He is oriented to person, place, and time. He appears well-developed. He is obese.   Cardiovascular: Normal rate and regular rhythm.   Pulmonary/Chest: Normal expansion and effort normal.   Neurological: He is alert and oriented to person, place, and time.   Psychiatric: His behavior is normal.       Laboratory  Lab Results   Component Value Date    WBC 10.27 09/13/2024    RBC 5.14 09/13/2024    HGB 17.4 09/13/2024 " "   HCT 50.8 09/13/2024    MCV 99 (H) 09/13/2024    MCH 33.9 (H) 09/13/2024    MCHC 34.3 09/13/2024    RDW 13.2 09/13/2024     09/13/2024    MPV 10.4 09/13/2024    GRAN 6.2 09/13/2024    GRAN 60.1 09/13/2024    LYMPH 2.7 09/13/2024    LYMPH 25.9 09/13/2024    MONO 0.9 09/13/2024    MONO 8.6 09/13/2024    EOS 0.4 09/13/2024    BASO 0.07 09/13/2024    EOSINOPHIL 4.1 09/13/2024    BASOPHIL 0.7 09/13/2024       BMP  Lab Results   Component Value Date     06/04/2024    K 3.9 06/04/2024     06/04/2024    CO2 21 (L) 06/04/2024    BUN 12 06/04/2024    CREATININE 0.8 06/04/2024    CALCIUM 10.0 06/04/2024    ANIONGAP 11 06/04/2024    ESTGFRAFRICA >60 11/16/2016    EGFRNONAA >60 11/16/2016    AST 19 06/04/2024    ALT 29 06/04/2024    PROT 7.5 06/04/2024       Lab Results   Component Value Date    BNP <10 01/11/2023    BNP 11 11/16/2016       Lab Results   Component Value Date    TSH 1.140 08/08/2024       No results found for: "SEDRATE"    No results found for: "CRP"  No results found for: "IGE"     No results found for: "ASPERGILLUS"  No results found for: "AFUMIGATUSCL"     No results found for: "ACE"     Diagnostic Results:  I have personally reviewed today the following studies:              CTA chest June 2024 unremarkable  Assessment/Plan:   SOBOE (shortness of breath on exertion)  -     Complete PFT with bronchodilator; Future; Expected date: 09/30/2024  -     Stress test, pulmonary; Future  -     PFT - related Arterial Blood Gas; Future    ENRIQUE on CPAP    Polycythemia    Former smoker    Anxiety and depression    Excessive daytime sleepiness    Chronic insomnia      Patient mean O2 saturation is 94% not indicative of ENRIQUE being the major culprit in-patient polycythemic.      I did encourage him to continue CPAP during sleep.  He will bring his machine next visit.  He did obtain it online.  He does use a nasal cushion mask.    Assess exercise hypoxemia and PFT.      Discussed with patient the 2 model " "sleep processes C (circadian rhythm) and process S (homeostatic sleep pressure) and the importance of aligning the 2 processes.  Discussed with patient as well the Shane 3 P's insomnia model (predisposing, precipitating, perpetuating factors) and tools to combat these factors.     Discussed and advised patient to apply  sleep restriction, stimulus control, relaxation technique, imagery, and paradoxical intention.      Patient was educated about the symptoms and signs of drowsy driving and about effective countermeasures. Symptoms of drowsy driving include difficulty focusing, frequent blinking, heavy eyelids, daydreaming, wandering/disconnected thoughts, difficulty remembering the last few miles driven (sometimes called "automatic behavior") or missing exits and street signs, frequent yawning, rubbing eyes, difficulty keeping head up, drifting from magdalene to magdalene, tailgating or hitting a shoulder, and feeling restless and irritable .  Patient was made  aware that the ability to self-rate sleepiness and performance is unreliable . Although performance continues to decline with cumulative days of sleep deprivation, subjective ratings of sleepiness tend to level off after the first few days in laboratory conditions of sleep deprivation , and drivers are often unaware of their own level of sleepiness .  Rather than driving while drowsy, patient was told to use other modes of transportation, such as ride sharing, public transportation, taxis, or walking. Importantly, drivers should be advised to plan ahead so that they avoid driving during times of day when they are likely to be sleepy, such as mid-afternoon, late at night, or after a period of sleep deprivation; to keep their trips short (under 20 minutes); and to use alternative modes of transportation.  Patient was warned about the risk of driving while drowsy.  Patient was instructed that patients who are considered high-risk (eg, those with excessive daytime " sleepiness and a history of a drowsy driving crash or near miss) should be warned not to drive until therapy has been instituted and proven effective.      Follow up in about 6 months (around 3/16/2025).    This note was prepared using voice recognition system and is likely to have sound alike errors that may have been overlooked even after proof reading.  Please call me with any questions    Discussed diagnosis, its evaluation, treatment and usual course. All questions answered.      Ely Smiley MD

## 2024-09-23 ENCOUNTER — OFFICE VISIT (OUTPATIENT)
Dept: PSYCHIATRY | Facility: CLINIC | Age: 44
End: 2024-09-23
Payer: MEDICAID

## 2024-09-23 VITALS
DIASTOLIC BLOOD PRESSURE: 93 MMHG | WEIGHT: 315 LBS | BODY MASS INDEX: 39.17 KG/M2 | SYSTOLIC BLOOD PRESSURE: 159 MMHG | HEART RATE: 68 BPM | HEIGHT: 75 IN

## 2024-09-23 DIAGNOSIS — F41.1 GAD (GENERALIZED ANXIETY DISORDER): ICD-10-CM

## 2024-09-23 DIAGNOSIS — F41.0 PANIC DISORDER: Primary | ICD-10-CM

## 2024-09-23 PROCEDURE — 90792 PSYCH DIAG EVAL W/MED SRVCS: CPT | Mod: SA,HB,, | Performed by: NURSE PRACTITIONER

## 2024-09-23 PROCEDURE — 99212 OFFICE O/P EST SF 10 MIN: CPT | Mod: PBBFAC | Performed by: NURSE PRACTITIONER

## 2024-09-23 PROCEDURE — 3080F DIAST BP >= 90 MM HG: CPT | Mod: SA,HB,CPTII, | Performed by: NURSE PRACTITIONER

## 2024-09-23 PROCEDURE — 3077F SYST BP >= 140 MM HG: CPT | Mod: SA,HB,CPTII, | Performed by: NURSE PRACTITIONER

## 2024-09-23 PROCEDURE — 99999 PR PBB SHADOW E&M-EST. PATIENT-LVL II: CPT | Mod: PBBFAC,SA,HB, | Performed by: NURSE PRACTITIONER

## 2024-09-23 PROCEDURE — 3044F HG A1C LEVEL LT 7.0%: CPT | Mod: SA,HB,CPTII, | Performed by: NURSE PRACTITIONER

## 2024-09-23 RX ORDER — ALPRAZOLAM 1 MG/1
TABLET ORAL
Qty: 20 TABLET | Refills: 1 | Status: SHIPPED | OUTPATIENT
Start: 2024-09-23

## 2024-09-23 RX ORDER — SERTRALINE HYDROCHLORIDE 50 MG/1
TABLET, FILM COATED ORAL
Qty: 30 TABLET | Refills: 5 | Status: SHIPPED | OUTPATIENT
Start: 2024-09-23
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
## 2024-09-26 DIAGNOSIS — I10 ESSENTIAL HYPERTENSION: Primary | ICD-10-CM

## 2024-09-26 RX ORDER — CLONIDINE HYDROCHLORIDE 0.1 MG/1
0.1 TABLET ORAL EVERY 6 HOURS PRN
Qty: 180 TABLET | Refills: 1 | Status: SHIPPED | OUTPATIENT
Start: 2024-09-26 | End: 2025-09-26

## 2024-10-29 DIAGNOSIS — R07.9 CHEST PAIN, UNSPECIFIED TYPE: ICD-10-CM

## 2024-10-29 RX ORDER — NITROGLYCERIN 0.4 MG/1
0.4 TABLET SUBLINGUAL EVERY 5 MIN PRN
Qty: 30 TABLET | Refills: 1 | Status: SHIPPED | OUTPATIENT
Start: 2024-10-29 | End: 2025-10-29

## 2024-11-06 ENCOUNTER — PATIENT MESSAGE (OUTPATIENT)
Dept: PSYCHIATRY | Facility: CLINIC | Age: 44
End: 2024-11-06
Payer: MEDICAID

## 2024-11-13 ENCOUNTER — OFFICE VISIT (OUTPATIENT)
Dept: PSYCHIATRY | Facility: CLINIC | Age: 44
End: 2024-11-13
Payer: MEDICAID

## 2024-11-13 DIAGNOSIS — F41.0 PANIC DISORDER: Primary | ICD-10-CM

## 2024-11-13 DIAGNOSIS — F41.1 GAD (GENERALIZED ANXIETY DISORDER): ICD-10-CM

## 2024-11-13 RX ORDER — SERTRALINE HYDROCHLORIDE 100 MG/1
150 TABLET, FILM COATED ORAL DAILY
Qty: 45 TABLET | Refills: 2 | Status: SHIPPED | OUTPATIENT
Start: 2024-12-10

## 2024-11-13 RX ORDER — SERTRALINE HYDROCHLORIDE 100 MG/1
TABLET, FILM COATED ORAL
Qty: 38 TABLET | Refills: 0 | Status: SHIPPED | OUTPATIENT
Start: 2024-11-13 | End: 2024-12-13

## 2024-11-13 RX ORDER — ALPRAZOLAM 1 MG/1
TABLET ORAL
Qty: 30 TABLET | Refills: 2 | Status: SHIPPED | OUTPATIENT
Start: 2024-11-13

## 2024-11-13 NOTE — PROGRESS NOTES
"Outpatient Psychiatry Follow-Up Visit (MD/NP)    11/13/2024    Clinical Status of Patient:  Outpatient (Ambulatory)    Chief Complaint:  Kendrick Moreno is a 44 y.o. male who presents today for follow-up of anxiety.  Met with patient.      The patient location is: [place]   The chief complaint leading to consultation is: VANESSA/panic    Visit type: audiovisual    Face to Face time with patient: 13 minutes   25 minutes of total time spent on the encounter, which includes face to face time and non-face to face time preparing to see the patient (eg, review of tests), Obtaining and/or reviewing separately obtained history, Documenting clinical information in the electronic or other health record, Independently interpreting results (not separately reported) and communicating results to the patient/family/caregiver, or Care coordination (not separately reported).     Each patient to whom he or she provides medical services by telemedicine is:  (1) informed of the relationship between the physician and patient and the respective role of any other health care provider with respect to management of the patient; and (2) notified that he or she may decline to receive medical services by telemedicine and may withdraw from such care at any time.    Notes:       Interval History and Content of Current Session:    Social/medical updates -- no major social changes.    "So I think the Zoloft is helping."    Mood -- presents with euthymic mood and affect. "I feel a little better during the day, not so much gloom and doom." Denies persistent depressed mood, denies anhedonia. No thoughts of death or SI. No shemar.   Anxiety -- reports improvement in generalized anxiety, more manageable during daytime, somewhat less active mind with middle night awakenings. No appreciable change in frequency of panic attacks, 1-4x weekly. No recent ED visits due to panic attacks.   Attention -- no concerns expressed  Psychosis -- no  Sleep -- improving middle " insomnia, averaging closer to 5 hours nightly, CPAP  Energy --   Appetite -- no change from baseline, weight relatively stable, ~ 310 lb    Substance use -- nicotine, occasional. Rare ETOH use, 1x every few months, none since last appointment. No illicit substance use.     Medications:    Sertraline 50 mg daily -- compliant, denies SE  Alprazolam 0.5 - 1 mg daily PRN  -- takes PRN for panic attacks, denies SE, increase to #30 tablets    Previous medication trials -- citalopram (30 mg), Ritalin (childhood)     Brief synopsis:  Improvement in VANESSA, continued panic attacks, denies SI/HI/AVH    Review of Systems   PSYCHIATRIC: Pertinant items are noted in the narrative.  CONSTITUTIONAL: See above.   MUSCULOSKELETAL: No pain or stiffness of the joints.  NEUROLOGIC: No weakness, sensory changes, seizures, confusion, memory loss, tremor or other abnormal movements.  GASTROINTESTINAL: No nausea, vomiting, pain, constipation or diarrhea.    Past Medical, Family and Social History: The patient's past medical, family and social history have been reviewed and updated as appropriate within the electronic medical record - see encounter notes.    Living situation: lives with wife and 3 children.  Relationships: has close group of friends  Academic hx: high school graduate, technical training   Developmental hx: raised by both parents. Has 4 brothers. Middle class upbringing. No early life trauma or abuse.  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   There were no vitals filed for this visit.     General:  unremarkable, age appropriate     Musculoskeletal  Muscle Strength/Tone:  BASIM -- telemedicine    Gait & Station:  BASIM -- telemedicine      Psychiatric  Speech:  no latency; no press   Mood & Affect:  euthymic, anxious  congruent and appropriate   Thought Process:  normal and logical   Associations:  intact   Thought Content:  normal, no suicidality, no homicidality, delusions, or paranoia   Insight:  intact   Judgement: behavior is adequate to circumstances   Orientation:  grossly intact   Memory: intact for content of interview   Language: grossly intact   Attention Span & Concentration:  able to focus   Fund of Knowledge:  intact and appropriate to age and level of education     Assessment and Diagnosis   Status/Progress: Based on the examination today, the patient's problem(s) is/are improved and inadequately controlled.  New problems have been presented today.   Co-morbidities, Diagnostic uncertainty, and Lack of compliance are not complicating management of the primary condition.  There are no active rule-out diagnoses for this patient at this time.     General Impression: Kendrick Moreno is a 44 y.o. male with a psychiatric hx of depression and anxiety presenting with symptoms consistent with panic disorder and VANESSA. Medical hx significant for HTN, HLD, ENRIQUE. Family MH hx is significant for anxiety and early onset Alzheimer's disease. No hx of psychiatric hospitalizations. No hx of SA, SIB, or violence. No hx of substance abuse. Lives with wife and 3 children. 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     11/13/24 -- improvement in VANESSA with sertraline, minimal response for panic attacks. Titrate sertraline to 150 mg over 2 weeks. Continue alprazolam 0.5 - 1 mg daily PRN for panic attacks        ICD-10-CM ICD-9-CM   1. Panic disorder  F41.0 300.01   2. VANESSA (generalized anxiety disorder)  F41.1 300.02        Intervention/Counseling/Treatment Plan   Reviewed patient's current symptoms and medication regimen  Medication changes as above  Discussed continued plan of reducing alprazolam dose/frequency once panic is better managed with SSRI    Medication Management  Prescription drug management was employed during the encounter, as medications were prescribed, or considered but not prescribed.   [facility] reviewed  The risks and benefits of medication were discussed with the patient.  Possible expectable adverse effects of any current or proposed individual psychotropic agents were discussed with this patient.  Counseling was provided on the importance of full compliance with any prescribed medication.  Detailed instructions were provided to the patient regarding the administration of any prescribed medication.  Patient voiced understanding    Return to Clinic:  6-8 weeks

## 2024-11-27 DIAGNOSIS — I10 ESSENTIAL HYPERTENSION: Primary | ICD-10-CM

## 2024-11-27 RX ORDER — NIFEDIPINE 90 MG/1
90 TABLET, EXTENDED RELEASE ORAL EVERY MORNING
Qty: 90 TABLET | Refills: 1 | Status: SHIPPED | OUTPATIENT
Start: 2024-11-27 | End: 2025-11-27

## 2024-12-16 ENCOUNTER — PATIENT MESSAGE (OUTPATIENT)
Dept: PULMONOLOGY | Facility: CLINIC | Age: 44
End: 2024-12-16
Payer: MEDICAID

## 2024-12-17 ENCOUNTER — LAB VISIT (OUTPATIENT)
Dept: LAB | Facility: HOSPITAL | Age: 44
End: 2024-12-17
Attending: NURSE PRACTITIONER
Payer: MEDICAID

## 2024-12-17 ENCOUNTER — PATIENT MESSAGE (OUTPATIENT)
Dept: HEMATOLOGY/ONCOLOGY | Facility: CLINIC | Age: 44
End: 2024-12-17
Payer: MEDICAID

## 2024-12-17 DIAGNOSIS — G47.09 OTHER INSOMNIA: ICD-10-CM

## 2024-12-17 DIAGNOSIS — D75.1 SECONDARY POLYCYTHEMIA: ICD-10-CM

## 2024-12-17 DIAGNOSIS — D75.89 MACROCYTOSIS: ICD-10-CM

## 2024-12-17 DIAGNOSIS — E53.8 FOLIC ACID DEFICIENCY: ICD-10-CM

## 2024-12-17 DIAGNOSIS — F32.A DEPRESSION, UNSPECIFIED DEPRESSION TYPE: ICD-10-CM

## 2024-12-17 LAB
ALBUMIN SERPL BCP-MCNC: 4 G/DL (ref 3.5–5.2)
ALP SERPL-CCNC: 90 U/L (ref 40–150)
ALT SERPL W/O P-5'-P-CCNC: 30 U/L (ref 10–44)
ANION GAP SERPL CALC-SCNC: 15 MMOL/L (ref 8–16)
AST SERPL-CCNC: 27 U/L (ref 10–40)
BASOPHILS # BLD AUTO: 0.11 K/UL (ref 0–0.2)
BASOPHILS NFR BLD: 0.8 % (ref 0–1.9)
BILIRUB SERPL-MCNC: 0.7 MG/DL (ref 0.1–1)
BUN SERPL-MCNC: 8 MG/DL (ref 6–20)
CALCIUM SERPL-MCNC: 9.8 MG/DL (ref 8.7–10.5)
CHLORIDE SERPL-SCNC: 101 MMOL/L (ref 95–110)
CO2 SERPL-SCNC: 20 MMOL/L (ref 23–29)
CREAT SERPL-MCNC: 0.8 MG/DL (ref 0.5–1.4)
DIFFERENTIAL METHOD BLD: ABNORMAL
EOSINOPHIL # BLD AUTO: 0.5 K/UL (ref 0–0.5)
EOSINOPHIL NFR BLD: 3.7 % (ref 0–8)
ERYTHROCYTE [DISTWIDTH] IN BLOOD BY AUTOMATED COUNT: 12.3 % (ref 11.5–14.5)
EST. GFR  (NO RACE VARIABLE): >60 ML/MIN/1.73 M^2
FOLATE SERPL-MCNC: 8.7 NG/ML (ref 4–24)
GLUCOSE SERPL-MCNC: 81 MG/DL (ref 70–110)
HCT VFR BLD AUTO: 51 % (ref 40–54)
HGB BLD-MCNC: 17.9 G/DL (ref 14–18)
IMM GRANULOCYTES # BLD AUTO: 0.15 K/UL (ref 0–0.04)
IMM GRANULOCYTES NFR BLD AUTO: 1.1 % (ref 0–0.5)
LYMPHOCYTES # BLD AUTO: 3.1 K/UL (ref 1–4.8)
LYMPHOCYTES NFR BLD: 22.2 % (ref 18–48)
MCH RBC QN AUTO: 33.8 PG (ref 27–31)
MCHC RBC AUTO-ENTMCNC: 35.1 G/DL (ref 32–36)
MCV RBC AUTO: 96 FL (ref 82–98)
MONOCYTES # BLD AUTO: 1 K/UL (ref 0.3–1)
MONOCYTES NFR BLD: 6.8 % (ref 4–15)
NEUTROPHILS # BLD AUTO: 9.1 K/UL (ref 1.8–7.7)
NEUTROPHILS NFR BLD: 65.4 % (ref 38–73)
NRBC BLD-RTO: 0 /100 WBC
PLATELET # BLD AUTO: 195 K/UL (ref 150–450)
PMV BLD AUTO: 10.8 FL (ref 9.2–12.9)
POTASSIUM SERPL-SCNC: 3.8 MMOL/L (ref 3.5–5.1)
PROT SERPL-MCNC: 7.3 G/DL (ref 6–8.4)
RBC # BLD AUTO: 5.29 M/UL (ref 4.6–6.2)
SODIUM SERPL-SCNC: 136 MMOL/L (ref 136–145)
WBC # BLD AUTO: 13.9 K/UL (ref 3.9–12.7)

## 2024-12-17 PROCEDURE — 80053 COMPREHEN METABOLIC PANEL: CPT | Performed by: NURSE PRACTITIONER

## 2024-12-17 PROCEDURE — 85025 COMPLETE CBC W/AUTO DIFF WBC: CPT | Performed by: NURSE PRACTITIONER

## 2024-12-17 PROCEDURE — 82746 ASSAY OF FOLIC ACID SERUM: CPT | Performed by: NURSE PRACTITIONER

## 2024-12-17 PROCEDURE — 36415 COLL VENOUS BLD VENIPUNCTURE: CPT | Mod: PN | Performed by: NURSE PRACTITIONER

## 2025-01-06 ENCOUNTER — PATIENT MESSAGE (OUTPATIENT)
Dept: HEMATOLOGY/ONCOLOGY | Facility: CLINIC | Age: 45
End: 2025-01-06
Payer: MEDICAID

## 2025-01-06 ENCOUNTER — PATIENT MESSAGE (OUTPATIENT)
Dept: PULMONOLOGY | Facility: CLINIC | Age: 45
End: 2025-01-06
Payer: MEDICAID

## 2025-01-08 ENCOUNTER — PATIENT MESSAGE (OUTPATIENT)
Dept: INFUSION THERAPY | Facility: HOSPITAL | Age: 45
End: 2025-01-08
Payer: MEDICAID

## 2025-01-08 ENCOUNTER — OFFICE VISIT (OUTPATIENT)
Dept: PSYCHIATRY | Facility: CLINIC | Age: 45
End: 2025-01-08
Payer: MEDICAID

## 2025-01-08 DIAGNOSIS — F41.1 GAD (GENERALIZED ANXIETY DISORDER): ICD-10-CM

## 2025-01-08 DIAGNOSIS — D64.9 ANEMIA, UNSPECIFIED TYPE: Primary | ICD-10-CM

## 2025-01-08 DIAGNOSIS — F41.0 PANIC DISORDER: Primary | ICD-10-CM

## 2025-01-08 RX ORDER — SERTRALINE HYDROCHLORIDE 50 MG/1
TABLET, FILM COATED ORAL
Qty: 21 TABLET | Refills: 0 | Status: SHIPPED | OUTPATIENT
Start: 2025-01-08

## 2025-01-08 RX ORDER — TRAZODONE HYDROCHLORIDE 50 MG/1
50 TABLET ORAL NIGHTLY
Qty: 30 TABLET | Refills: 2 | Status: SHIPPED | OUTPATIENT
Start: 2025-01-08 | End: 2025-01-08

## 2025-01-08 RX ORDER — VENLAFAXINE HYDROCHLORIDE 37.5 MG/1
CAPSULE, EXTENDED RELEASE ORAL
Qty: 78 CAPSULE | Refills: 0 | Status: SHIPPED | OUTPATIENT
Start: 2025-01-08

## 2025-01-08 RX ORDER — ALPRAZOLAM 1 MG/1
TABLET ORAL
Qty: 45 TABLET | Refills: 2 | Status: SHIPPED | OUTPATIENT
Start: 2025-01-08

## 2025-01-08 RX ORDER — TRAZODONE HYDROCHLORIDE 50 MG/1
50 TABLET ORAL NIGHTLY PRN
Qty: 30 TABLET | Refills: 2 | Status: SHIPPED | OUTPATIENT
Start: 2025-01-08

## 2025-01-08 RX ORDER — VENLAFAXINE HYDROCHLORIDE 150 MG/1
150 CAPSULE, EXTENDED RELEASE ORAL DAILY
Qty: 30 CAPSULE | Refills: 2 | Status: SHIPPED | OUTPATIENT
Start: 2025-02-05

## 2025-01-08 NOTE — PROGRESS NOTES
"Outpatient Psychiatry Follow-Up Visit (MD/NP)    1/8/2025    Clinical Status of Patient:  Outpatient (Ambulatory)    Chief Complaint:  Kendrick Moreno is a 44 y.o. male who presents today for follow-up of anxiety.  Met with patient.      The patient location is: [place]   The chief complaint leading to consultation is: VANESSA/panic    Visit type: audiovisual    Face to Face time with patient: 20 minutes  29 minutes of total time spent on the encounter, which includes face to face time and non-face to face time preparing to see the patient (eg, review of tests), Obtaining and/or reviewing separately obtained history, Documenting clinical information in the electronic or other health record, Independently interpreting results (not separately reported) and communicating results to the patient/family/caregiver, or Care coordination (not separately reported).     Each patient to whom he or she provides medical services by telemedicine is:  (1) informed of the relationship between the physician and patient and the respective role of any other health care provider with respect to management of the patient; and (2) notified that he or she may decline to receive medical services by telemedicine and may withdraw from such care at any time.    Notes:       Interval History and Content of Current Session:    Social/medical updates -- no major social changes.    "Pretty much the same as last time, still having panic issues."    Mood -- presents with euthymic mood and affect. Denies persistent depressed mood, denies anhedonia. No thoughts of death or SI. No shemar.   Anxiety -- minimal change in panic attacks with titration of sertraline. Frequency of panic attacks ~ 3x weekly, "some weeks it's every day, other weeks it's only 1-2 days." Notes that generalized anxiety has improved since starting sertraline  Attention -- no concerns expressed  Psychosis -- no  Sleep -- worsening early insomnia with sertraline titration   Energy -- fair, " associated with poor sleep  Appetite -- baseline    Substance use -- occasional nicotine. Rare ETOH use. No illicit substance use    Medications:    Sertraline 150 mg daily -- compliant, + insomnia, cross-taper to venlafaxine XR  Alprazolam 0.5 - 1 mg daily PRN  -- takes PRN for panic attacks, denies SE, change to 1-2 mg daily PRN, increase to #45 tablets    Start: Venlafaxine XR --> titrate to 150 mg daily    Previous medication trials -- citalopram (30 mg, ineffective), sertraline (150 mg, ineffective for panic, helpful for mood/VANESSA), Ritalin (childhood)     Brief synopsis:  Minimal response to sertraline for panic attacks, denies SI/HI/AVH    Review of Systems   PSYCHIATRIC: Pertinant items are noted in the narrative.  CONSTITUTIONAL: See above.   MUSCULOSKELETAL: No pain or stiffness of the joints.  NEUROLOGIC: No weakness, sensory changes, seizures, confusion, memory loss, tremor or other abnormal movements.  GASTROINTESTINAL: No nausea, vomiting, pain, constipation or diarrhea.    Past Medical, Family and Social History: The patient's past medical, family and social history have been reviewed and updated as appropriate within the electronic medical record - see encounter notes.    Living situation: lives with wife and 3 children.  Relationships: has close group of friends  Academic hx: high school graduate, technical training   Developmental hx: raised by both parents. Has 4 brothers. Middle class upbringing. No early life trauma or abuse.  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   There were no vitals filed for this visit.     General:  unremarkable, age appropriate     Musculoskeletal  Muscle Strength/Tone:  BASIM -- telemedicine    Gait & Station:  BASIM -- telemedicine      Psychiatric  Speech:  no latency; no press   Mood & Affect:  euthymic, anxious  congruent and appropriate   Thought Process:  normal and logical   Associations:  intact   Thought Content:  normal, no suicidality, no homicidality, delusions, or paranoia   Insight:  intact   Judgement: behavior is adequate to circumstances   Orientation:  grossly intact   Memory: intact for content of interview   Language: grossly intact   Attention Span & Concentration:  able to focus   Fund of Knowledge:  intact and appropriate to age and level of education     Assessment and Diagnosis   Status/Progress: Based on the examination today, the patient's problem(s) is/are inadequately controlled.  New problems have been presented today.   Co-morbidities, Diagnostic uncertainty, and Lack of compliance are not complicating management of the primary condition.  There are no active rule-out diagnoses for this patient at this time.     General Impression: Kendrick Moreno is a 44 y.o. male with a psychiatric hx of depression and anxiety presenting with symptoms consistent with panic disorder and VANESSA. Medical hx significant for HTN, HLD, ENRIQUE. Family MH hx is significant for anxiety and early onset Alzheimer's disease. No hx of psychiatric hospitalizations. No hx of SA, SIB, or violence. No hx of substance abuse. Lives with wife and 3 children. 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     11/13/24 -- improvement in VANESSA with sertraline, minimal response for panic attacks. Titrate sertraline to 150 mg over 2 weeks. Continue alprazolam 0.5 - 1 mg daily PRN for panic attacks    01/08/25 -- no improvement in panic attacks with sertraline titration. Cross-taper to Venlafaxine  XR. Adjust alprazolam PRN to 1-2 mg daily PRN (#45 tablets) to assist with panic attacks until SNRI takes effect. Start trazodone 50 mg PRN for insomnia.      ICD-10-CM ICD-9-CM   1. Panic disorder  F41.0 300.01   2. VANESSA (generalized anxiety disorder)  F41.1 300.02         Intervention/Counseling/Treatment Plan   Reviewed patient's current symptoms and medication regimen  Medication changes as above  Discussed continued plan of reducing alprazolam dose/frequency once panic is better managed with SSRI    Medication Management  Prescription drug management was employed during the encounter, as medications were prescribed, or considered but not prescribed.   [facility] reviewed  The risks and benefits of medication were discussed with the patient.  Possible expectable adverse effects of any current or proposed individual psychotropic agents were discussed with this patient.  Counseling was provided on the importance of full compliance with any prescribed medication.  Detailed instructions were provided to the patient regarding the administration of any prescribed medication.  Patient voiced understanding    Return to Clinic:  8 weeks       patient

## 2025-01-13 ENCOUNTER — TELEPHONE (OUTPATIENT)
Dept: HEMATOLOGY/ONCOLOGY | Facility: CLINIC | Age: 45
End: 2025-01-13
Payer: MEDICAID

## 2025-02-07 DIAGNOSIS — R07.9 CHEST PAIN, UNSPECIFIED TYPE: ICD-10-CM

## 2025-02-07 RX ORDER — NITROGLYCERIN 0.4 MG/1
0.4 TABLET SUBLINGUAL EVERY 5 MIN PRN
Qty: 30 TABLET | Refills: 1 | OUTPATIENT
Start: 2025-02-07 | End: 2026-02-07

## 2025-02-07 NOTE — TELEPHONE ENCOUNTER
Attempted to reach patient, no answer left VM to advise that I did receive the refill request for the Nitroglycerin and would forward it to Dr. Lopez for approval however he was to have a 4 week F/U in July 2024 and has canceled several appointments. Advised that he would have to schedule follow up with Dr. Lopez as soon as possible and must keep this appointment.

## 2025-02-17 ENCOUNTER — PATIENT MESSAGE (OUTPATIENT)
Dept: PULMONOLOGY | Facility: CLINIC | Age: 45
End: 2025-02-17
Payer: MEDICAID

## 2025-02-28 ENCOUNTER — PATIENT MESSAGE (OUTPATIENT)
Dept: OPHTHALMOLOGY | Facility: CLINIC | Age: 45
End: 2025-02-28
Payer: MEDICAID

## 2025-03-06 ENCOUNTER — PATIENT MESSAGE (OUTPATIENT)
Dept: ADMINISTRATIVE | Facility: HOSPITAL | Age: 45
End: 2025-03-06
Payer: MEDICAID

## 2025-03-10 ENCOUNTER — OFFICE VISIT (OUTPATIENT)
Dept: PSYCHIATRY | Facility: CLINIC | Age: 45
End: 2025-03-10
Payer: MEDICAID

## 2025-03-10 ENCOUNTER — PATIENT MESSAGE (OUTPATIENT)
Dept: HEMATOLOGY/ONCOLOGY | Facility: CLINIC | Age: 45
End: 2025-03-10
Payer: MEDICAID

## 2025-03-10 DIAGNOSIS — F41.1 GAD (GENERALIZED ANXIETY DISORDER): ICD-10-CM

## 2025-03-10 DIAGNOSIS — F41.0 PANIC DISORDER: Primary | ICD-10-CM

## 2025-03-10 RX ORDER — TRAZODONE HYDROCHLORIDE 50 MG/1
TABLET ORAL
Qty: 60 TABLET | Refills: 2 | Status: SHIPPED | OUTPATIENT
Start: 2025-03-10

## 2025-03-10 RX ORDER — SERTRALINE HYDROCHLORIDE 25 MG/1
TABLET, FILM COATED ORAL
Qty: 30 TABLET | Refills: 2 | Status: SHIPPED | OUTPATIENT
Start: 2025-03-10

## 2025-03-10 RX ORDER — VENLAFAXINE HYDROCHLORIDE 75 MG/1
75 CAPSULE, EXTENDED RELEASE ORAL DAILY
Qty: 30 CAPSULE | Refills: 2 | Status: SHIPPED | OUTPATIENT
Start: 2025-03-10

## 2025-03-10 RX ORDER — VENLAFAXINE HYDROCHLORIDE 150 MG/1
150 CAPSULE, EXTENDED RELEASE ORAL DAILY
Qty: 30 CAPSULE | Refills: 2 | Status: SHIPPED | OUTPATIENT
Start: 2025-03-10

## 2025-03-10 NOTE — PROGRESS NOTES
"Outpatient Psychiatry Follow-Up Visit (MD/NP)    3/10/2025    Clinical Status of Patient:  Outpatient (Ambulatory)    Chief Complaint:  Kendrick Moreno is a 44 y.o. male who presents today for follow-up of anxiety.  Met with patient.      The patient location is: [place]   The chief complaint leading to consultation is: VANESSA/panic    Visit type: audiovisual    Face to Face time with patient: 16 minutes   27 minutes of total time spent on the encounter, which includes face to face time and non-face to face time preparing to see the patient (eg, review of tests), Obtaining and/or reviewing separately obtained history, Documenting clinical information in the electronic or other health record, Independently interpreting results (not separately reported) and communicating results to the patient/family/caregiver, or Care coordination (not separately reported).       Each patient to whom he or she provides medical services by telemedicine is:  (1) informed of the relationship between the physician and patient and the respective role of any other health care provider with respect to management of the patient; and (2) notified that he or she may decline to receive medical services by telemedicine and may withdraw from such care at any time.    Notes:     Interval History and Content of Current Session:    Social/medical updates -- no major social changes.    "They've been ok I suppose."    Mood -- presents with euthymic mood and affect. Denies persistent depressed mood, denies anhedonia. No thoughts of death or SI. No shemar.   Anxiety -- "maybe 15-20% better." Reports difficulty tapering sertraline to discontinuation, felt "icky" after stopping medication, anxiety worsened, restarted at 25 mg dose and has been taking this with Venlafaxine. No evident signs or symptoms of serotonin syndrome. Overall anxiety is somewhat improved. No major change in panic attacks, occur ~ 2-3x weekly. Utilizes alprazolam with good effect. "   Attention -- no concerns expressed  Psychosis -- no  Sleep -- fair, trazodone beneficial for early insomnia   Energy -- adequate  Appetite -- adequate    Substance use -- no ETOH use, no illicit substance use     Medications:    Sertraline 25 mg daily -- takes daily, denies SE, taper to 12.5 mg in 2 weeks  Venlafaxine  mg daily -- compliant, denies SE, titrate to 225 mg daily   Trazodone 50 mg nightly PRN for insomnia -- takes nightly, denies SE, titrate to 50 - 100 mg   Alprazolam 1-2 mg daily PRN for severe anxiety or panic -- takes 3-4x weekly, denies SE    Previous medication trials -- citalopram (30 mg, ineffective), sertraline (150 mg, ineffective for panic, helpful for mood/VANESSA), Ritalin (childhood), clonidine (for BP)    Brief synopsis:  Mild response to venlafaxine XR, denies SI/HI/AVH    Review of Systems   PSYCHIATRIC: Pertinant items are noted in the narrative.  CONSTITUTIONAL: See above.   MUSCULOSKELETAL: No pain or stiffness of the joints.  NEUROLOGIC: No weakness, sensory changes, seizures, confusion, memory loss, tremor or other abnormal movements.  GASTROINTESTINAL: No nausea, vomiting, pain, constipation or diarrhea.    Past Medical, Family and Social History: The patient's past medical, family and social history have been reviewed and updated as appropriate within the electronic medical record - see encounter notes.    Living situation: lives with wife and 3 children.  Relationships: has close group of friends  Academic hx: high school graduate, technical training   Developmental hx: raised by both parents. Has 4 brothers. Middle class upbringing. No early life trauma or abuse.  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   There were no vitals filed for this visit.     General:  unremarkable, age appropriate     Musculoskeletal  Muscle Strength/Tone:  BASIM -- telemedicine    Gait & Station:  BASIM -- telemedicine      Psychiatric  Speech:  no latency; no press   Mood & Affect:  euthymic, anxious  congruent and appropriate   Thought Process:  normal and logical   Associations:  intact   Thought Content:  normal, no suicidality, no homicidality, delusions, or paranoia   Insight:  intact   Judgement: behavior is adequate to circumstances   Orientation:  grossly intact   Memory: intact for content of interview   Language: grossly intact   Attention Span & Concentration:  able to focus   Fund of Knowledge:  intact and appropriate to age and level of education     Assessment and Diagnosis   Status/Progress: Based on the examination today, the patient's problem(s) is/are inadequately controlled.  New problems have been presented today.   Co-morbidities, Diagnostic uncertainty, and Lack of compliance are not complicating management of the primary condition.  There are no active rule-out diagnoses for this patient at this time.     General Impression: Kendrick Moreno is a 44 y.o. male with a psychiatric hx of depression and anxiety presenting with symptoms consistent with panic disorder and VANESSA. Medical hx significant for HTN, HLD, ENRIQUE (CPAP). Family MH hx is significant for anxiety and early onset Alzheimer's disease. No hx of psychiatric hospitalizations. No hx of SA, SIB, or violence. No hx of substance abuse. Lives with wife and 3 children. 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     11/13/24 -- improvement in VANESSA with sertraline, minimal response for panic attacks. Titrate sertraline to 150  mg over 2 weeks. Continue alprazolam 0.5 - 1 mg daily PRN for panic attacks    01/08/25 -- no improvement in panic attacks with sertraline titration. Cross-taper to Venlafaxine XR. Adjust alprazolam PRN to 1-2 mg daily PRN (#45 tablets) to assist with panic attacks until SNRI takes effect. Start trazodone 50 mg PRN for insomnia.    03/10/25 -- mild improvement in anxiety/panic symptoms with venlafaxine XR. Had difficulty stopping sertraline, continues to take 25 mg dose. Will titrate Venlafaxine XR to 225 mg. Taper sertraline to 12.5 mg. Titrate trazodone to 50 - 100 mg nightly PRN for insomnia.      ICD-10-CM ICD-9-CM   1. Panic disorder  F41.0 300.01   2. VANESSA (generalized anxiety disorder)  F41.1 300.02           Intervention/Counseling/Treatment Plan   Reviewed patient's current symptoms and medication regimen  Medication changes as above  Discussed risk of serotonin syndrome with combination of venlafaxine and sertraline  Patient expressed understanding  Plan to taper sertraline on more gradual schedule (12.5 mg --> discontinuation)  Utilizes alprazolam appropriately for panic attacks  Reviewed ultimate plan to decrease medication in the future once panic is better controlled with SNRI  Will likely discuss psychotherapy options, CBT, at next visit     Medication Management  Prescription drug management was employed during the encounter, as medications were prescribed, or considered but not prescribed.   [facility] reviewed  The risks and benefits of medication were discussed with the patient.  Possible expectable adverse effects of any current or proposed individual psychotropic agents were discussed with this patient.  Counseling was provided on the importance of full compliance with any prescribed medication.  Detailed instructions were provided to the patient regarding the administration of any prescribed medication.  Patient voiced understanding    Return to Clinic:  2 months

## 2025-03-28 ENCOUNTER — PATIENT MESSAGE (OUTPATIENT)
Dept: HEMATOLOGY/ONCOLOGY | Facility: CLINIC | Age: 45
End: 2025-03-28
Payer: MEDICAID

## 2025-04-02 DIAGNOSIS — F41.0 PANIC DISORDER: ICD-10-CM

## 2025-04-03 RX ORDER — ALPRAZOLAM 1 MG/1
TABLET ORAL
Qty: 45 TABLET | Refills: 1 | Status: SHIPPED | OUTPATIENT
Start: 2025-04-03

## 2025-04-14 ENCOUNTER — LAB VISIT (OUTPATIENT)
Dept: LAB | Facility: HOSPITAL | Age: 45
End: 2025-04-14
Attending: NURSE PRACTITIONER
Payer: MEDICAID

## 2025-04-14 DIAGNOSIS — D64.9 ANEMIA, UNSPECIFIED TYPE: ICD-10-CM

## 2025-04-14 LAB
ALBUMIN SERPL BCP-MCNC: 3.7 G/DL (ref 3.5–5.2)
ALP SERPL-CCNC: 90 UNIT/L (ref 40–150)
ALT SERPL W/O P-5'-P-CCNC: 43 UNIT/L (ref 10–44)
ANION GAP (OHS): 13 MMOL/L (ref 8–16)
AST SERPL-CCNC: 42 UNIT/L (ref 11–45)
BILIRUB SERPL-MCNC: 0.3 MG/DL (ref 0.1–1)
BUN SERPL-MCNC: 9 MG/DL (ref 6–20)
CALCIUM SERPL-MCNC: 10 MG/DL (ref 8.7–10.5)
CHLORIDE SERPL-SCNC: 103 MMOL/L (ref 95–110)
CO2 SERPL-SCNC: 23 MMOL/L (ref 23–29)
CREAT SERPL-MCNC: 0.9 MG/DL (ref 0.5–1.4)
FOLATE SERPL-MCNC: 6.9 NG/ML (ref 4–24)
GFR SERPLBLD CREATININE-BSD FMLA CKD-EPI: >60 ML/MIN/1.73/M2
GLUCOSE SERPL-MCNC: 93 MG/DL (ref 70–110)
POTASSIUM SERPL-SCNC: 4 MMOL/L (ref 3.5–5.1)
PROT SERPL-MCNC: 7.5 GM/DL (ref 6–8.4)
SODIUM SERPL-SCNC: 139 MMOL/L (ref 136–145)

## 2025-04-14 PROCEDURE — 80053 COMPREHEN METABOLIC PANEL: CPT

## 2025-04-14 PROCEDURE — 36415 COLL VENOUS BLD VENIPUNCTURE: CPT | Mod: PN

## 2025-04-14 PROCEDURE — 82746 ASSAY OF FOLIC ACID SERUM: CPT

## 2025-04-14 PROCEDURE — 85025 COMPLETE CBC W/AUTO DIFF WBC: CPT

## 2025-04-15 ENCOUNTER — PATIENT MESSAGE (OUTPATIENT)
Dept: HEMATOLOGY/ONCOLOGY | Facility: CLINIC | Age: 45
End: 2025-04-15
Payer: MEDICAID

## 2025-04-15 LAB
ABSOLUTE EOSINOPHIL (OHS): 0.4 K/UL
ABSOLUTE MONOCYTE (OHS): 0.75 K/UL (ref 0.3–1)
ABSOLUTE NEUTROPHIL COUNT (OHS): 5.68 K/UL (ref 1.8–7.7)
BASOPHILS # BLD AUTO: 0.08 K/UL
BASOPHILS NFR BLD AUTO: 0.8 %
ERYTHROCYTE [DISTWIDTH] IN BLOOD BY AUTOMATED COUNT: 13.2 % (ref 11.5–14.5)
HCT VFR BLD AUTO: 57.1 % (ref 40–54)
HGB BLD-MCNC: 18.7 GM/DL (ref 14–18)
IMM GRANULOCYTES # BLD AUTO: 0.08 K/UL (ref 0–0.04)
IMM GRANULOCYTES NFR BLD AUTO: 0.8 % (ref 0–0.5)
LYMPHOCYTES # BLD AUTO: 2.99 K/UL (ref 1–4.8)
MCH RBC QN AUTO: 34.2 PG (ref 27–31)
MCHC RBC AUTO-ENTMCNC: 32.7 G/DL (ref 32–36)
MCV RBC AUTO: 105 FL (ref 82–98)
NUCLEATED RBC (/100WBC) (OHS): 0 /100 WBC
PLATELET # BLD AUTO: 179 K/UL (ref 150–450)
PMV BLD AUTO: 11.4 FL (ref 9.2–12.9)
RBC # BLD AUTO: 5.46 M/UL (ref 4.6–6.2)
RELATIVE EOSINOPHIL (OHS): 4 %
RELATIVE LYMPHOCYTE (OHS): 30 % (ref 18–48)
RELATIVE MONOCYTE (OHS): 7.5 % (ref 4–15)
RELATIVE NEUTROPHIL (OHS): 56.9 % (ref 38–73)
WBC # BLD AUTO: 9.98 K/UL (ref 3.9–12.7)

## 2025-04-21 ENCOUNTER — PATIENT MESSAGE (OUTPATIENT)
Dept: INFUSION THERAPY | Facility: HOSPITAL | Age: 45
End: 2025-04-21
Payer: MEDICAID

## 2025-04-23 ENCOUNTER — OFFICE VISIT (OUTPATIENT)
Dept: HEMATOLOGY/ONCOLOGY | Facility: CLINIC | Age: 45
End: 2025-04-23
Payer: MEDICAID

## 2025-04-23 ENCOUNTER — INFUSION (OUTPATIENT)
Dept: INFUSION THERAPY | Facility: HOSPITAL | Age: 45
End: 2025-04-23
Attending: INTERNAL MEDICINE
Payer: MEDICAID

## 2025-04-23 DIAGNOSIS — G47.33 OSA ON CPAP: ICD-10-CM

## 2025-04-23 DIAGNOSIS — D75.1 POLYCYTHEMIA: Primary | ICD-10-CM

## 2025-04-23 DIAGNOSIS — D75.89 MACROCYTOSIS: ICD-10-CM

## 2025-04-23 DIAGNOSIS — E53.8 FOLIC ACID DEFICIENCY: ICD-10-CM

## 2025-04-23 DIAGNOSIS — D75.1 SECONDARY POLYCYTHEMIA: Primary | ICD-10-CM

## 2025-04-23 PROCEDURE — 99195 PHLEBOTOMY: CPT

## 2025-04-23 RX ORDER — LIDOCAINE HYDROCHLORIDE 10 MG/ML
1 INJECTION, SOLUTION EPIDURAL; INFILTRATION; INTRACAUDAL; PERINEURAL ONCE
OUTPATIENT
Start: 2025-04-30 | End: 2025-04-30

## 2025-04-23 RX ORDER — LIDOCAINE HYDROCHLORIDE 10 MG/ML
1 INJECTION, SOLUTION EPIDURAL; INFILTRATION; INTRACAUDAL; PERINEURAL ONCE
Status: DISCONTINUED | OUTPATIENT
Start: 2025-04-23 | End: 2025-04-23

## 2025-04-23 NOTE — PROGRESS NOTES
The patient location is: home  The chief complaint leading to consultation is: tired    Visit type: audiovisual    Face to Face time with patient: 15  30 minutes of total time spent on the encounter, which includes face to face time and non-face to face time preparing to see the patient (eg, review of tests), Obtaining and/or reviewing separately obtained history, Documenting clinical information in the electronic or other health record, Independently interpreting results (not separately reported) and communicating results to the patient/family/caregiver, or Care coordination (not separately reported).     Each patient to whom he or she provides medical services by telemedicine is:  (1) informed of the relationship between the physician and patient and the respective role of any other health care provider with respect to management of the patient; and (2) notified that he or she may decline to receive medical services by telemedicine and may withdraw from such care at any time.    Patient ID: Kendrick Moreno is a 44 y.o. male.    Chief Complaint: tired    HPI:   45 yo male presents to the hematology clinic as a new patient consulted for further evaluation and treatment of polycythemia.  He has been referred to the clinic by his cardiologist, Dr. Lopez.      Used to smoke cigarettes - 1 pack/day for 15 years.  Quit about 4 years ago.  Now occasional vapes a couple of times/week.  Denies any illicit drug use.  Has social alcohol on occasion.       Denies itching or excessive headaches.  Denies previous history of blood clots.     Family hx of blood disorders:  None known.     Family hx of cancer:  Maternal aunt: lung cancer - smoker  Maternal aunt: lung cancer- smoker  Maternal uncle: lung cancer - smoker  Maternal uncle: pancreatic cancer     Has not had baseline psa testing.  Found with moderate position obstructive sleep apnea. Therapy with CPAP indicated.   States that he is using his cpap about 2-6 hours a night  "since sleep study in 2/2023.     Has gained some weight over the last several years - up to 301, but now is losing weight and is down to 280's intentionally.     Denies use of testosterone or supplements.  Denies frequent high altitude travel.       Interval History:   9/13/2024 States that he has been using his cpap consistently nightly - averaging 4-6 hours per night use. Recently found with folate deficiency - workup was done due to memory issues.  Has not started folic acid supplement as of yet. With macrocytosis noted.  Hct wnl.  No phlebotomy needed currently.    Interval history:  4/23/2025 States that he is currently weighing 295 lbs.  And continues to use the cpap about 4-6 hours/night.  4/14/2025 57.1, mcv 105.  Continues to take folic acid supplement daily for deficiency found in the past.   States that he feels "worn out".  Schedule for 1 unit phlebotomy today at [facility].     I have reviewed all of the patient's relevant lab work available in the medical record and have utilized this in my evaluation and management recommendations today.      Social History     Socioeconomic History    Marital status:    Tobacco Use    Smoking status: Light Smoker     Current packs/day: 0.25     Average packs/day: 0.3 packs/day for 1 year (0.3 ttl pk-yrs)     Types: Cigars, Cigarettes, Vaping with nicotine    Smokeless tobacco: Never    Tobacco comments:     Cigars     Pt vapes with nicotine socially   Substance and Sexual Activity    Alcohol use: Yes    Drug use: No    Sexual activity: Yes     Partners: Female     Birth control/protection: None     Social Drivers of Health     Financial Resource Strain: Low Risk  (3/9/2025)    Overall Financial Resource Strain (CARDIA)     Difficulty of Paying Living Expenses: Not very hard   Food Insecurity: Food Insecurity Present (3/9/2025)    Hunger Vital Sign     Worried About Running Out of Food in the Last Year: Sometimes true     Ran Out of Food in the Last " Year: Never true   Transportation Needs: No Transportation Needs (3/9/2025)    PRAPARE - Transportation     Lack of Transportation (Medical): No     Lack of Transportation (Non-Medical): No   Physical Activity: Insufficiently Active (3/9/2025)    Exercise Vital Sign     Days of Exercise per Week: 2 days     Minutes of Exercise per Session: 20 min   Stress: Stress Concern Present (3/9/2025)    [employer] [place] [employer] - Occupational Stress Questionnaire     Feeling of Stress : Very much   Housing Stability: High Risk (3/9/2025)    Housing Stability Vital Sign     Unable to Pay for Housing in the Last Year: Yes     Number of Times Moved in the Last Year: 0     Homeless in the Last Year: No       No family history on file.    No past surgical history on file.    No past medical history on file.    Review of Systems   Constitutional:  Negative for appetite change and unexpected weight change.   HENT:  Negative for mouth sores.    Eyes:  Negative for visual disturbance.   Respiratory:  Negative for cough and shortness of breath.    Cardiovascular:  Negative for chest pain.   Gastrointestinal:  Negative for abdominal pain and diarrhea.   Endocrine: Negative.    Genitourinary:  Negative for frequency.   Musculoskeletal:  Negative for back pain.   Skin:  Negative for rash.   Allergic/Immunologic: Negative.    Neurological:  Negative for headaches.   Hematological:  Negative for adenopathy.   Psychiatric/Behavioral:  Positive for dysphoric mood and sleep disturbance. The patient is not nervous/anxious.         Forgetfulness          Medication List with Changes/Refills   Current Medications    ALPRAZOLAM (XANAX) 1 MG TABLET    Take 1-2 tablets daily as needed for severe anxiety or panic    ASPIRIN (ECOTRIN) 81 MG EC TABLET    Take 1 tablet (81 mg total) by mouth once daily.    ATORVASTATIN (LIPITOR) 20 MG TABLET    Take 1 tablet (20 mg total) by mouth every evening. Changing Pravastatin to Lipitor due to  insurance    CARVEDILOL (COREG) 25 MG TABLET    Take 1 tablet (25 mg total) by mouth 2 (two) times daily with meals.    CLONIDINE (CATAPRES) 0.1 MG TABLET    Take 1 tablet (0.1 mg total) by mouth every 6 (six) hours as needed (for BP > 180/90).    FOLIC ACID (FOLVITE) 400 MCG TABLET    Take 1 tablet (400 mcg total) by mouth once daily.    HYDRALAZINE (APRESOLINE) 100 MG TABLET    Take 1 tablet (100 mg total) by mouth 3 (three) times daily.    ISOSORBIDE MONONITRATE (IMDUR) 60 MG 24 HR TABLET    Take 1 tablet (60 mg total) by mouth every evening.    NIFEDIPINE (PROCARDIA-XL) 90 MG (OSM) 24 HR TABLET    Take 1 tablet (90 mg total) by mouth every morning.    NITROGLYCERIN (NITROSTAT) 0.4 MG SL TABLET    Place 1 tablet (0.4 mg total) under the tongue every 5 (five) minutes as needed for Chest pain. After 3 dose need to call EMS    ONDANSETRON (ZOFRAN) 4 MG TABLET    Take 1 tablet (4 mg total) by mouth every 6 (six) hours as needed for Nausea.    OXYCODONE-ACETAMINOPHEN (PERCOCET)  MG PER TABLET    Take 1 tablet by mouth every 8 (eight) hours as needed for Pain.    SERTRALINE (ZOLOFT) 25 MG TABLET    Take 1 tablet daily for 2 weeks, then take 1/2 tablet daily    TAMSULOSIN (FLOMAX) 0.4 MG CAP    Take 1 capsule (0.4 mg total) by mouth once daily.    TRAZODONE (DESYREL) 50 MG TABLET    Take 1-2 tablet nightly as needed for sleep    VALSARTAN-HYDROCHLOROTHIAZIDE (DIOVAN-HCT) 320-25 MG PER TABLET    Take 1 tablet by mouth once daily.    VENLAFAXINE (EFFEXOR-XR) 150 MG CP24    Take 1 capsule (150 mg total) by mouth once daily.    VENLAFAXINE (EFFEXOR-XR) 75 MG 24 HR CAPSULE    Take 1 capsule (75 mg total) by mouth once daily. With 150 mg capsule        Objective:     There were no vitals filed for this visit.      Physical Exam  Vitals reviewed.   Constitutional:       Appearance: Normal appearance. He is obese.   HENT:      Head: Normocephalic and atraumatic.      Right Ear: External ear normal.      Left Ear: External  ear normal.   Cardiovascular:      Rate and Rhythm: Normal rate and regular rhythm.      Heart sounds: Normal heart sounds, S1 normal and S2 normal.   Pulmonary:      Effort: Pulmonary effort is normal.      Breath sounds: Normal breath sounds.   Abdominal:      General: There is no distension.   Musculoskeletal:         General: Normal range of motion.      Cervical back: Normal range of motion.   Skin:     General: Skin is warm and dry.   Neurological:      General: No focal deficit present.      Mental Status: He is alert and oriented to person, place, and time.   Psychiatric:         Attention and Perception: Attention and perception normal.         Mood and Affect: Mood and affect normal.         Speech: Speech normal.         Behavior: Behavior normal. Behavior is cooperative.         Thought Content: Thought content normal.         Cognition and Memory: Cognition and memory normal.         Judgment: Judgment normal.       Assessment:     Problem List Items Addressed This Visit    None        Lab Results   Component Value Date    WBC 9.98 04/14/2025    RBC 5.46 04/14/2025    HGB 18.7 (H) 04/14/2025    HCT 57.1 (H) 04/14/2025     (H) 04/14/2025    MCH 34.2 (H) 04/14/2025    MCHC 32.7 04/14/2025    RDW 13.2 04/14/2025     04/14/2025    MPV 11.4 04/14/2025    GRAN 9.1 (H) 12/17/2024    GRAN 65.4 12/17/2024    LYMPH 30.0 04/14/2025    LYMPH 2.99 04/14/2025    MONO 7.5 04/14/2025    MONO 0.75 04/14/2025    EOS 4.0 04/14/2025    EOS 0.40 04/14/2025    BASO 0.11 12/17/2024    EOSINOPHIL 3.7 12/17/2024    BASOPHIL 0.8 04/14/2025    BASOPHIL 0.08 04/14/2025      Lab Results   Component Value Date     04/14/2025    K 4.0 04/14/2025     04/14/2025    CO2 23 04/14/2025    BUN 9 04/14/2025    CREATININE 0.9 04/14/2025    CALCIUM 10.0 04/14/2025    ANIONGAP 13 04/14/2025    ESTGFRAFRICA >60 11/16/2016    EGFRNONAA >60 11/16/2016     Lab Results   Component Value Date    ALT 43 04/14/2025    AST 42  04/14/2025    ALKPHOS 90 04/14/2025    BILITOT 0.3 04/14/2025     Lab Results   Component Value Date    IRON 90 09/13/2024    TRANSFERRIN 364 09/13/2024    TIBC 539 (H) 09/13/2024    FESATURATED 17 (L) 09/13/2024    FERRITIN 62 09/13/2024      Lab Results   Component Value Date    FOLATE 6.9 04/14/2025     Mpn reflexive and non exon prerna 2 studies normal  Erythropoietin normal  Plan:   There are no diagnoses linked to this encounter.    Med Onc Chart Routing      Follow up with physician    Follow up with [facility] . Fu 5 weeks with cbc prior - VV- possible phlebotomy for hct > 55%   Infusion scheduling note    Injection scheduling note repeat cbc in 2 weeks with possible phlebotomy   Labs   Scheduling:  Preferred lab: [facility] [address]  Lab interval:  cbc in 2 weeks and cbc prior to f/u in 5 weeks   Imaging   Abdominal US - [address]  or [facility] [address] (preferably if done there)   Pharmacy appointment No pharmacy appointment needed      Other referrals       No additional referrals needed  n/a          Continue Folvite 400 mcg PO daily for previously diagnosed folic acid deficiency.  1 unit phlebotomy today for hct > 55%.  In 2 weeks repeat cbc and if Hct > 55% [facility]    Assess macrocytosis with abdominal US - f/u fatty liver disease.       Total time spent on encounter: 30 minutes    Collaborating Provider:  Dr. Twin Hernandez    Thank You,  Martin Cali, FNP-C  Benign Hematology

## 2025-04-23 NOTE — NURSING
One unit therapeutic phlebotomy performed via right A/C without difficulty.  Upon completion, needle dc'd, pressure dressing applied, and blood discarded in appropriate container.   Pt denies weakness/dizziness.  Pt discharged with next appt scheduled.

## 2025-04-29 ENCOUNTER — APPOINTMENT (OUTPATIENT)
Dept: RADIOLOGY | Facility: HOSPITAL | Age: 45
End: 2025-04-29
Attending: NURSE PRACTITIONER
Payer: MEDICAID

## 2025-04-29 DIAGNOSIS — D75.89 MACROCYTOSIS: ICD-10-CM

## 2025-04-29 PROCEDURE — 76700 US EXAM ABDOM COMPLETE: CPT | Mod: 26,,, | Performed by: RADIOLOGY

## 2025-04-29 PROCEDURE — 76700 US EXAM ABDOM COMPLETE: CPT | Mod: TC,PO

## 2025-05-07 ENCOUNTER — LAB VISIT (OUTPATIENT)
Dept: LAB | Facility: HOSPITAL | Age: 45
End: 2025-05-07
Attending: NURSE PRACTITIONER
Payer: MEDICAID

## 2025-05-07 DIAGNOSIS — D75.89 MACROCYTOSIS: ICD-10-CM

## 2025-05-07 DIAGNOSIS — D75.1 SECONDARY POLYCYTHEMIA: ICD-10-CM

## 2025-05-07 DIAGNOSIS — E53.8 FOLIC ACID DEFICIENCY: ICD-10-CM

## 2025-05-07 LAB
ABSOLUTE EOSINOPHIL (OHS): 0.36 K/UL
ABSOLUTE MONOCYTE (OHS): 1.03 K/UL (ref 0.3–1)
ABSOLUTE NEUTROPHIL COUNT (OHS): 9.09 K/UL (ref 1.8–7.7)
BASOPHILS # BLD AUTO: 0.11 K/UL
BASOPHILS NFR BLD AUTO: 0.8 %
ERYTHROCYTE [DISTWIDTH] IN BLOOD BY AUTOMATED COUNT: 11.9 % (ref 11.5–14.5)
HCT VFR BLD AUTO: 49.2 % (ref 40–54)
HGB BLD-MCNC: 17.9 GM/DL (ref 14–18)
IMM GRANULOCYTES # BLD AUTO: 0.16 K/UL (ref 0–0.04)
IMM GRANULOCYTES NFR BLD AUTO: 1.2 % (ref 0–0.5)
LYMPHOCYTES # BLD AUTO: 2.38 K/UL (ref 1–4.8)
MCH RBC QN AUTO: 35 PG (ref 27–31)
MCHC RBC AUTO-ENTMCNC: 36.4 G/DL (ref 32–36)
MCV RBC AUTO: 96 FL (ref 82–98)
NUCLEATED RBC (/100WBC) (OHS): 0 /100 WBC
PLATELET # BLD AUTO: 199 K/UL (ref 150–450)
PMV BLD AUTO: 10.5 FL (ref 9.2–12.9)
RBC # BLD AUTO: 5.11 M/UL (ref 4.6–6.2)
RELATIVE EOSINOPHIL (OHS): 2.7 %
RELATIVE LYMPHOCYTE (OHS): 18.1 % (ref 18–48)
RELATIVE MONOCYTE (OHS): 7.8 % (ref 4–15)
RELATIVE NEUTROPHIL (OHS): 69.4 % (ref 38–73)
WBC # BLD AUTO: 13.13 K/UL (ref 3.9–12.7)

## 2025-05-07 PROCEDURE — 85025 COMPLETE CBC W/AUTO DIFF WBC: CPT

## 2025-05-07 PROCEDURE — 36415 COLL VENOUS BLD VENIPUNCTURE: CPT | Mod: PN

## 2025-05-08 ENCOUNTER — RESULTS FOLLOW-UP (OUTPATIENT)
Dept: HEMATOLOGY/ONCOLOGY | Facility: CLINIC | Age: 45
End: 2025-05-08

## 2025-05-08 ENCOUNTER — TELEPHONE (OUTPATIENT)
Dept: HEMATOLOGY/ONCOLOGY | Facility: CLINIC | Age: 45
End: 2025-05-08
Payer: MEDICAID

## 2025-05-08 DIAGNOSIS — D75.89 MACROCYTOSIS: ICD-10-CM

## 2025-05-08 DIAGNOSIS — R16.0 HEPATOMEGALY: ICD-10-CM

## 2025-05-08 DIAGNOSIS — K76.0 FATTY LIVER: ICD-10-CM

## 2025-05-08 DIAGNOSIS — D72.828 GRANULOCYTOSIS: Primary | ICD-10-CM

## 2025-05-08 DIAGNOSIS — K76.0 HEPATIC STEATOSIS: ICD-10-CM

## 2025-05-08 DIAGNOSIS — N28.89 OTHER SPECIFIED DISORDERS OF KIDNEY AND URETER: ICD-10-CM

## 2025-05-08 DIAGNOSIS — N28.1 COMPLEX RENAL CYST: Primary | ICD-10-CM

## 2025-05-08 NOTE — PROGRESS NOTES
I placed orders for CT abdomen pelvis w wo contrast for eval of complex renal cyst - referral placed for urology.  Placed orders for fibroscan.  If abnormal, will refer to hepatology.  Can you please let patient know and get him scheduled for his scans?    Aziza Mohamud'

## 2025-05-08 NOTE — TELEPHONE ENCOUNTER
Spoke with patient and scheduled fibroscan accordingly. Patient appointment has also been added to the wait-list should someone cancel or reschedule.     ----- Message from Solo Kinsey sent at 5/8/2025  2:40 PM CDT -----  Good Afternoon Can you please contact pt in regards to scheduling a fibro scan? Thank you Tio

## 2025-05-09 ENCOUNTER — TELEPHONE (OUTPATIENT)
Dept: HEMATOLOGY/ONCOLOGY | Facility: CLINIC | Age: 45
End: 2025-05-09
Payer: MEDICAID

## 2025-05-09 DIAGNOSIS — N28.1 RENAL CYST: Primary | ICD-10-CM

## 2025-05-09 NOTE — TELEPHONE ENCOUNTER
Nurse spoke with pt in regards to needing an external referral placed for urology. Pt stated he would like the referral to be faxed over to his personal fax. Nurse verbalized understanding. Referral was sent to [phone]

## 2025-05-09 NOTE — TELEPHONE ENCOUNTER
----- Message from Aziza Cali NP sent at 5/9/2025 11:29 AM CDT -----  Ok, I put in external orders for referral urology  ----- Message -----  From: Tio Lerner MA  Sent: 5/9/2025   7:14 AM CDT  To: Aziza Cali NP    Hey he would need an outside referral.  ----- Message -----  From: Amber Rebollar LPN  Sent: 5/8/2025   4:17 PM CDT  To: KATELYNN Alvarez,  I'm sorry but we are not accepting new medicaid pts at this time.  Thanks-Amber Rebollar LPN  ----- Message -----  From: Tio Lerner MA  Sent: 5/8/2025   2:43 PM CDT  To: [facility] Clinical Staff    Good Afternoon Can you please contact pt in regards to scheduling a np appointment, Referral has been placed by Aziza Cali. Thank You Tio

## 2025-05-12 ENCOUNTER — LAB VISIT (OUTPATIENT)
Dept: LAB | Facility: HOSPITAL | Age: 45
End: 2025-05-12
Attending: NURSE PRACTITIONER
Payer: MEDICAID

## 2025-05-12 DIAGNOSIS — D72.828 GRANULOCYTOSIS: ICD-10-CM

## 2025-05-12 LAB
ABSOLUTE EOSINOPHIL (OHS): 0.33 K/UL
ABSOLUTE MONOCYTE (OHS): 0.78 K/UL (ref 0.3–1)
ABSOLUTE NEUTROPHIL COUNT (OHS): 8.87 K/UL (ref 1.8–7.7)
BASOPHILS # BLD AUTO: 0.11 K/UL
BASOPHILS NFR BLD AUTO: 0.9 %
CRP SERPL-MCNC: 10.4 MG/L
ERYTHROCYTE [DISTWIDTH] IN BLOOD BY AUTOMATED COUNT: 12.1 % (ref 11.5–14.5)
ERYTHROCYTE [SEDIMENTATION RATE] IN BLOOD: 3 MM/HR
HCT VFR BLD AUTO: 50.1 % (ref 40–54)
HGB BLD-MCNC: 17.8 GM/DL (ref 14–18)
IMM GRANULOCYTES # BLD AUTO: 0.09 K/UL (ref 0–0.04)
IMM GRANULOCYTES NFR BLD AUTO: 0.7 % (ref 0–0.5)
LYMPHOCYTES # BLD AUTO: 2.59 K/UL (ref 1–4.8)
MCH RBC QN AUTO: 34.6 PG (ref 27–31)
MCHC RBC AUTO-ENTMCNC: 35.5 G/DL (ref 32–36)
MCV RBC AUTO: 98 FL (ref 82–98)
NUCLEATED RBC (/100WBC) (OHS): 0 /100 WBC
PLATELET # BLD AUTO: 205 K/UL (ref 150–450)
PMV BLD AUTO: 10.8 FL (ref 9.2–12.9)
RBC # BLD AUTO: 5.14 M/UL (ref 4.6–6.2)
RELATIVE EOSINOPHIL (OHS): 2.6 %
RELATIVE LYMPHOCYTE (OHS): 20.3 % (ref 18–48)
RELATIVE MONOCYTE (OHS): 6.1 % (ref 4–15)
RELATIVE NEUTROPHIL (OHS): 69.4 % (ref 38–73)
WBC # BLD AUTO: 12.77 K/UL (ref 3.9–12.7)

## 2025-05-12 PROCEDURE — 36415 COLL VENOUS BLD VENIPUNCTURE: CPT | Mod: PO

## 2025-05-12 PROCEDURE — 85025 COMPLETE CBC W/AUTO DIFF WBC: CPT

## 2025-05-12 PROCEDURE — 85652 RBC SED RATE AUTOMATED: CPT

## 2025-05-12 PROCEDURE — 86140 C-REACTIVE PROTEIN: CPT

## 2025-05-13 ENCOUNTER — TELEPHONE (OUTPATIENT)
Dept: HEPATOLOGY | Facility: CLINIC | Age: 45
End: 2025-05-13
Payer: MEDICAID

## 2025-05-13 ENCOUNTER — PATIENT MESSAGE (OUTPATIENT)
Dept: HEMATOLOGY/ONCOLOGY | Facility: CLINIC | Age: 45
End: 2025-05-13
Payer: MEDICAID

## 2025-05-13 NOTE — TELEPHONE ENCOUNTER
----- Message from Anat sent at 5/13/2025  2:04 PM CDT -----  Contact: pt  Type:  Sooner Apoointment RequestCaller is requesting a sooner appointment.  Caller declined first available appointment listed below.  Caller will not accept being placed on the waitlist and is requesting a message be sent to doctor.Name of Caller: ptWhen is the first available appointment? Pt is erlin for 7/31 for a fibroscan and need a much sooner apptSymptoms: fatty liverWould the patient rather a call back or a response via [facility]?  [employer] Call Back Number: [phone] Information:

## 2025-05-13 NOTE — TELEPHONE ENCOUNTER
Call returned to the patient.  Patient requested to reschedule a fibroscan to sooner appt.  Appt rescheduled 5/16 per pt request

## 2025-05-13 NOTE — TELEPHONE ENCOUNTER
Returned patient's call and informed him that currently I have no available appts that are sooner. Patient did state that he has had some tests done at [facility] and will contact them to see if they could get him in sooner.

## 2025-05-13 NOTE — TELEPHONE ENCOUNTER
----- Message from Alejandra sent at 5/13/2025  2:14 PM CDT -----  Regarding: r/s earlier Fibroscan at [facility]  Contact: Patient can be contacted @# [phone]  PT has a fibroscan sched in  on 7/31. PT would like to get sooner. Please reach out to r/s for [facility] if available earlier. Please advisePatient can be contacted @# [phone]

## 2025-05-14 ENCOUNTER — RESULTS FOLLOW-UP (OUTPATIENT)
Dept: HEMATOLOGY/ONCOLOGY | Facility: CLINIC | Age: 45
End: 2025-05-14

## 2025-05-14 ENCOUNTER — LAB VISIT (OUTPATIENT)
Dept: LAB | Facility: HOSPITAL | Age: 45
End: 2025-05-14
Attending: NURSE PRACTITIONER
Payer: MEDICAID

## 2025-05-14 DIAGNOSIS — R25.2 CRAMPING OF HANDS: ICD-10-CM

## 2025-05-14 DIAGNOSIS — R25.2 CRAMPING OF HANDS: Primary | ICD-10-CM

## 2025-05-14 PROCEDURE — 36415 COLL VENOUS BLD VENIPUNCTURE: CPT | Mod: PN

## 2025-05-14 PROCEDURE — 86038 ANTINUCLEAR ANTIBODIES: CPT

## 2025-05-14 NOTE — PROGRESS NOTES
Can we please add DOROTA for him to do today?    Thank you,   Martin SHAIKH. Viraj - SOPHIEP -JESSIKA  Benign Hematology

## 2025-05-15 LAB — ANA (OHS): NORMAL

## 2025-05-16 ENCOUNTER — PROCEDURE VISIT (OUTPATIENT)
Dept: HEPATOLOGY | Facility: CLINIC | Age: 45
End: 2025-05-16
Payer: MEDICAID

## 2025-05-16 ENCOUNTER — HOSPITAL ENCOUNTER (OUTPATIENT)
Dept: RADIOLOGY | Facility: HOSPITAL | Age: 45
Discharge: HOME | End: 2025-05-16
Attending: NURSE PRACTITIONER
Payer: MEDICAID

## 2025-05-16 ENCOUNTER — RESULTS FOLLOW-UP (OUTPATIENT)
Dept: HEMATOLOGY/ONCOLOGY | Facility: CLINIC | Age: 45
End: 2025-05-16

## 2025-05-16 DIAGNOSIS — R16.0 HEPATOMEGALY: ICD-10-CM

## 2025-05-16 DIAGNOSIS — D75.89 MACROCYTOSIS: ICD-10-CM

## 2025-05-16 DIAGNOSIS — N28.89 OTHER SPECIFIED DISORDERS OF KIDNEY AND URETER: ICD-10-CM

## 2025-05-16 DIAGNOSIS — K76.0 FATTY LIVER: ICD-10-CM

## 2025-05-16 DIAGNOSIS — N28.1 RENAL CYST, RIGHT: Primary | ICD-10-CM

## 2025-05-16 DIAGNOSIS — N28.1 COMPLEX RENAL CYST: ICD-10-CM

## 2025-05-16 DIAGNOSIS — K76.0 HEPATIC STEATOSIS: ICD-10-CM

## 2025-05-16 PROCEDURE — 25500020 PHARM REV CODE 255: Performed by: NURSE PRACTITIONER

## 2025-05-16 PROCEDURE — 74178 CT ABD&PLV WO CNTR FLWD CNTR: CPT | Mod: TC

## 2025-05-16 PROCEDURE — 74178 CT ABD&PLV WO CNTR FLWD CNTR: CPT | Mod: 26,,, | Performed by: RADIOLOGY

## 2025-05-16 PROCEDURE — 91200 LIVER ELASTOGRAPHY: CPT | Mod: PBBFAC | Performed by: NURSE PRACTITIONER

## 2025-05-16 PROCEDURE — 91200 LIVER ELASTOGRAPHY: CPT | Mod: 26,S$PBB,, | Performed by: NURSE PRACTITIONER

## 2025-05-16 RX ADMIN — IOHEXOL 100 ML: 350 INJECTION, SOLUTION INTRAVENOUS at 09:05

## 2025-05-19 NOTE — PROCEDURES
FibroScan (Vibration Controlled Transient Elastography)    Date/Time: 5/16/2025 2:00 PM    Performed by: Elsa Crawford NP  Authorized by: Aziza Cali NP    Diagnosis:  NAFLD    Probe:  XL    Universal Protocol: Patient's identity, procedure and site were verified, confirmatory pause was performed.  Discussed procedure including risks and potential complications.  Questions answered.  Patient verbalizes understanding and wishes to proceed with VCTE.     Procedure: After providing explanations of the procedure, patient was placed in the supine position with right arm in maximum abduction to allow optimal exposure of right lateral abdomen.  Patient was briefly assessed, Testing was performed in the mid-axillary location, 50Hz Shear Wave pulses were applied and the resulting Shear Wave and Propagation Speed detected with a 3.5 MHz ultrasonic signal, using the FibroScan probe, Skin to liver capsule distance and liver parenchyma were accessed during the entire examination with the FibroScan probe, Patient was instructed to breathe normally and to abstain from sudden movements during the procedure, allowing for random measurements of liver stiffness. At least 10 Shear Waves were produced, Individual measurements of each Shear Wave were calculated.  Patient tolerated the procedure well with no complications.  Meets discharge criteria as was dismissed.  Rates pain 0 out of 10.  Patient will follow up with ordering provider to review results.    Findings  Median liver stiffness score:  5.7  CAP Reading: dB/m:  308    IQR/med %:  15  Interpretation  Fibrosis interpretation is based on medial liver stiffness - Kilopascal (kPa).    Fibrosis Stage:  F 0-1  Steatosis interpretation is based on controlled attenuation parameter - (dB/m).    Steatosis Grade:  S3  Comments/Plan:  Fibroscan notes fatty liver, no fibrosis. Recommend weight loss, low carb, regular exercise. Repeat fibroscan in 1-2 years

## 2025-05-20 ENCOUNTER — OFFICE VISIT (OUTPATIENT)
Dept: PSYCHIATRY | Facility: CLINIC | Age: 45
End: 2025-05-20
Payer: MEDICAID

## 2025-05-20 DIAGNOSIS — F41.0 PANIC DISORDER: Primary | ICD-10-CM

## 2025-05-20 DIAGNOSIS — F32.A DEPRESSION, UNSPECIFIED DEPRESSION TYPE: ICD-10-CM

## 2025-05-20 DIAGNOSIS — F41.1 GAD (GENERALIZED ANXIETY DISORDER): ICD-10-CM

## 2025-05-20 PROCEDURE — 98006 SYNCH AUDIO-VIDEO EST MOD 30: CPT | Mod: SA,HB,95, | Performed by: NURSE PRACTITIONER

## 2025-05-20 RX ORDER — VENLAFAXINE HYDROCHLORIDE 75 MG/1
75 CAPSULE, EXTENDED RELEASE ORAL DAILY
Qty: 30 CAPSULE | Refills: 5 | Status: SHIPPED | OUTPATIENT
Start: 2025-05-20

## 2025-05-20 RX ORDER — SERTRALINE HYDROCHLORIDE 25 MG/1
25 TABLET, FILM COATED ORAL DAILY
Qty: 30 TABLET | Refills: 5 | Status: SHIPPED | OUTPATIENT
Start: 2025-05-20

## 2025-05-20 RX ORDER — TRAZODONE HYDROCHLORIDE 50 MG/1
TABLET ORAL
Qty: 60 TABLET | Refills: 5 | Status: SHIPPED | OUTPATIENT
Start: 2025-05-20

## 2025-05-20 RX ORDER — VENLAFAXINE HYDROCHLORIDE 150 MG/1
150 CAPSULE, EXTENDED RELEASE ORAL DAILY
Qty: 30 CAPSULE | Refills: 5 | Status: SHIPPED | OUTPATIENT
Start: 2025-05-20

## 2025-05-20 RX ORDER — ALPRAZOLAM 1 MG/1
TABLET ORAL
Qty: 45 TABLET | Refills: 5 | Status: SHIPPED | OUTPATIENT
Start: 2025-06-10

## 2025-05-20 RX ORDER — TERAZOSIN 1 MG/1
CAPSULE ORAL
Qty: 60 CAPSULE | Refills: 5 | Status: SHIPPED | OUTPATIENT
Start: 2025-05-20

## 2025-05-20 NOTE — PROGRESS NOTES
"Outpatient Psychiatry Follow-Up Visit (MD/NP)    5/20/2025    Clinical Status of Patient:  Outpatient (Ambulatory)    Chief Complaint:  Kendrick Moreno is a 45 y.o. male who presents today for follow-up of anxiety.  Met with patient.      The patient location is: [place]   The chief complaint leading to consultation is: VANESSA/panic    Visit type: audiovisual    Face to Face time with patient: 17 minutes  36 minutes of total time spent on the encounter, which includes face to face time and non-face to face time preparing to see the patient (eg, review of tests), Obtaining and/or reviewing separately obtained history, Documenting clinical information in the electronic or other health record, Independently interpreting results (not separately reported) and communicating results to the patient/family/caregiver, or Care coordination (not separately reported).     Each patient to whom he or she provides medical services by telemedicine is:  (1) informed of the relationship between the physician and patient and the respective role of any other health care provider with respect to management of the patient; and (2) notified that he or she may decline to receive medical services by telemedicine and may withdraw from such care at any time.    Notes:       Interval History and Content of Current Session:    Social/medical updates -- no major social changes.    "I like the new medicine I think it does a good job, I'm happier you could say, I'm still having panic attacks, maybe a little less."    Mood -- presents with euthymic mood and affect. Affect remains noticeably brighter. Denies persistent depressed mood, denies anhedonia. No thoughts of death or SI. No shemar.   Anxiety -- mild improvement since previous visit. Panic attacks remain less frequent, typically 2-3x weekly. Continues to use 1-2 mg PRN for panic attacks with positive effect. Generalized worry remains adequately controlled.  Attention -- no concerns " expressed  Psychosis -- no  Sleep -- improved, trazodone effective  Energy -- adequate  Appetite -- adequate    Substance use -- denies all     Medications:    Sertraline 25 mg daily -- takes daily, denies SE, taper to 12.5 mg  Venlafaxine  mg daily -- compliant, + hyperhidrosis (improving)   Trazodone 50 - 100 mg nightly PRN for insomnia -- takes 100 mg nightly, denies SE   Alprazolam 1-2 mg daily PRN for severe anxiety or panic -- takes 3-4x weekly, denies SE    Start: Terazosin 1 mg qHS x1 week, then 2 mg qHS     Previous medication trials -- citalopram (30 mg, ineffective), sertraline (150 mg, ineffective for panic, helpful for mood/VAENSSA), Ritalin (childhood), clonidine (for BP)    Brief synopsis:  Mood/VANESSA sx stable, panic attacks, denies SI/HI/AVH    Review of Systems   PSYCHIATRIC: Pertinant items are noted in the narrative.  CONSTITUTIONAL: See above.   MUSCULOSKELETAL: No pain or stiffness of the joints.  NEUROLOGIC: No weakness, sensory changes, seizures, confusion, memory loss, tremor or other abnormal movements.  GASTROINTESTINAL: No nausea, vomiting, pain, constipation or diarrhea.    Past Medical, Family and Social History: The patient's past medical, family and social history have been reviewed and updated as appropriate within the electronic medical record - see encounter notes.    Living situation: lives with wife and 3 children.  Relationships: has close group of friends  Academic hx: high school graduate, technical training   Developmental hx: raised by both parents. Has 4 brothers. Middle class upbringing. No early life trauma or abuse.  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   There were no vitals filed for this visit.     General:  unremarkable, age appropriate     Musculoskeletal  Muscle Strength/Tone:  BASIM -- telemedicine    Gait & Station:  BASIM -- telemedicine      Psychiatric  Speech:  no latency; no press   Mood & Affect:  euthymic  congruent and appropriate   Thought Process:  normal and logical   Associations:  intact   Thought Content:  normal, no suicidality, no homicidality, delusions, or paranoia   Insight:  intact   Judgement: behavior is adequate to circumstances   Orientation:  grossly intact   Memory: intact for content of interview   Language: grossly intact   Attention Span & Concentration:  able to focus   Fund of Knowledge:  intact and appropriate to age and level of education     Assessment and Diagnosis   Status/Progress: Based on the examination today, the patient's problem(s) is/are improved and inadequately controlled.  New problems have been presented today.   Co-morbidities, Diagnostic uncertainty, and Lack of compliance are not complicating management of the primary condition.  There are no active rule-out diagnoses for this patient at this time.     General Impression: Kednrick Moreno is a 45 y.o. male with a psychiatric hx of depression and anxiety presenting with symptoms consistent with panic disorder and VANESSA. Medical hx significant for HTN, HLD, ENRIQUE (CPAP). Family MH hx is significant for anxiety and early onset Alzheimer's disease. No hx of psychiatric hospitalizations. No hx of SA, SIB, or violence. No hx of substance abuse. Lives with wife and 3 children. 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     11/13/24 -- improvement in VANESSA with sertraline, minimal response for panic attacks. Titrate sertraline to 150 mg over 2 weeks. Continue alprazolam 0.5 - 1 mg  daily PRN for panic attacks    01/08/25 -- no improvement in panic attacks with sertraline titration. Cross-taper to Venlafaxine XR. Adjust alprazolam PRN to 1-2 mg daily PRN (#45 tablets) to assist with panic attacks until SNRI takes effect. Start trazodone 50 mg PRN for insomnia.    03/10/25 -- mild improvement in anxiety/panic symptoms with venlafaxine XR. Had difficulty stopping sertraline, continues to take 25 mg dose. Will titrate Venlafaxine XR to 225 mg. Taper sertraline to 12.5 mg. Titrate trazodone to 50 - 100 mg nightly PRN for insomnia.    05/20/25 -- patient has responded positively to venlafaxine XR for mood and generalized anxiety symptoms. Panic disorder partially controlled, frequency of panic attacks has improved, though continue ~2-3x weekly. Experiencing hyperhidrosis due to venlafaxine XR, will start terazosin and titrate to 2 mg daily (may require 4-6 mg). Continue venlafaxine  mg, alprazolam 1-2 mg daily PRN for panic attacks, trazodone 50 - 100 mg nightly PRN for insomnia. Directed patient to taper sertraline from 25 to 12.5 mg.      ICD-10-CM ICD-9-CM   1. Panic disorder  F41.0 300.01   2. VANESSA (generalized anxiety disorder)  F41.1 300.02   3. Depression, unspecified depression type  F32.A 311             Intervention/Counseling/Treatment Plan   Reviewed patient's current symptoms and medication regimen  Medication changes as above  Reviewed risk of hypotension with combination with Diovan  Patient checks BP daily, will alert writer with significant changes  Directed patient to discontinue terazosin if he experiences dizziness   Discussed risk of serotonin syndrome with combination of venlafaxine and sertraline  Again directed him to taper sertraline to 12.5 mg  Patient expressed understanding   Utilizes alprazolam appropriately for panic attacks  Benefits appear to outweigh risks at this time. No signs of misuse or abuse. Takes medication appropriately PRN with significant positive  effect.    Medication Management  Prescription drug management was employed during the encounter, as medications were prescribed, or considered but not prescribed.   [facility] reviewed  The risks and benefits of medication were discussed with the patient.  Possible expectable adverse effects of any current or proposed individual psychotropic agents were discussed with this patient.  Counseling was provided on the importance of full compliance with any prescribed medication.  Detailed instructions were provided to the patient regarding the administration of any prescribed medication.  The most common and rare side effects were reviewed, including discussion of potential permanent movement disorder and disfiguring conditions if applicable to any prescribed medication  The medication plan was developed through shared decision-making with the patient, with consideration of the efficacy and safety of the medications and the patients preferences in treatment   Patient voiced understanding    Return to Clinic: 2-3 months

## 2025-05-21 DIAGNOSIS — D72.829 LEUKOCYTOSIS, UNSPECIFIED TYPE: Primary | ICD-10-CM

## 2025-05-21 DIAGNOSIS — D75.89 MACROCYTOSIS: ICD-10-CM

## 2025-05-28 ENCOUNTER — LAB VISIT (OUTPATIENT)
Dept: LAB | Facility: HOSPITAL | Age: 45
End: 2025-05-28
Attending: NURSE PRACTITIONER
Payer: MEDICAID

## 2025-05-28 DIAGNOSIS — D75.89 MACROCYTOSIS: ICD-10-CM

## 2025-05-28 DIAGNOSIS — D72.829 LEUKOCYTOSIS, UNSPECIFIED TYPE: ICD-10-CM

## 2025-05-28 DIAGNOSIS — D75.1 POLYCYTHEMIA: ICD-10-CM

## 2025-05-28 LAB
ABSOLUTE EOSINOPHIL (OHS): 0.4 K/UL
ABSOLUTE MONOCYTE (OHS): 0.81 K/UL (ref 0.3–1)
ABSOLUTE NEUTROPHIL COUNT (OHS): 9.61 K/UL (ref 1.8–7.7)
BASOPHILS # BLD AUTO: 0.07 K/UL
BASOPHILS NFR BLD AUTO: 0.5 %
ERYTHROCYTE [DISTWIDTH] IN BLOOD BY AUTOMATED COUNT: 12.2 % (ref 11.5–14.5)
HCT VFR BLD AUTO: 48.1 % (ref 40–54)
HGB BLD-MCNC: 17 GM/DL (ref 14–18)
IMM GRANULOCYTES # BLD AUTO: 0.08 K/UL (ref 0–0.04)
IMM GRANULOCYTES NFR BLD AUTO: 0.6 % (ref 0–0.5)
LYMPHOCYTES # BLD AUTO: 2.5 K/UL (ref 1–4.8)
MCH RBC QN AUTO: 34.4 PG (ref 27–31)
MCHC RBC AUTO-ENTMCNC: 35.3 G/DL (ref 32–36)
MCV RBC AUTO: 97 FL (ref 82–98)
NUCLEATED RBC (/100WBC) (OHS): 0 /100 WBC
PLATELET # BLD AUTO: 189 K/UL (ref 150–450)
PMV BLD AUTO: 11 FL (ref 9.2–12.9)
RBC # BLD AUTO: 4.94 M/UL (ref 4.6–6.2)
RELATIVE EOSINOPHIL (OHS): 3 %
RELATIVE LYMPHOCYTE (OHS): 18.6 % (ref 18–48)
RELATIVE MONOCYTE (OHS): 6 % (ref 4–15)
RELATIVE NEUTROPHIL (OHS): 71.3 % (ref 38–73)
WBC # BLD AUTO: 13.47 K/UL (ref 3.9–12.7)

## 2025-05-28 PROCEDURE — 83921 ORGANIC ACID SINGLE QUANT: CPT

## 2025-05-28 PROCEDURE — 36415 COLL VENOUS BLD VENIPUNCTURE: CPT | Mod: PN

## 2025-05-28 PROCEDURE — 85025 COMPLETE CBC W/AUTO DIFF WBC: CPT

## 2025-06-03 LAB — W METHYLMALONIC ACID: 0.33 UMOL/L

## 2025-08-29 ENCOUNTER — PATIENT MESSAGE (OUTPATIENT)
Dept: ADMINISTRATIVE | Facility: HOSPITAL | Age: 45
End: 2025-08-29
Payer: MEDICAID